# Patient Record
Sex: MALE | Race: WHITE | HISPANIC OR LATINO | Employment: OTHER | URBAN - METROPOLITAN AREA
[De-identification: names, ages, dates, MRNs, and addresses within clinical notes are randomized per-mention and may not be internally consistent; named-entity substitution may affect disease eponyms.]

---

## 2017-01-16 ENCOUNTER — TELEPHONE (OUTPATIENT)
Dept: UROLOGY | Facility: CLINIC | Age: 82
End: 2017-01-16

## 2017-01-16 NOTE — TELEPHONE ENCOUNTER
----- Message from Reta Garcia sent at 1/11/2017  3:20 PM CST -----  Contact: pt [phone]  Patient is calling to reschedule SUDS. Please call patient.

## 2017-01-23 DIAGNOSIS — K30 NUD (NONULCER DYSPEPSIA): ICD-10-CM

## 2017-01-23 RX ORDER — ONDANSETRON 4 MG/1
TABLET, FILM COATED ORAL
Qty: 25 TABLET | Refills: 0 | Status: SHIPPED | OUTPATIENT
Start: 2017-01-23 | End: 2017-05-11 | Stop reason: SDUPTHER

## 2017-02-16 ENCOUNTER — TELEPHONE (OUTPATIENT)
Dept: UROLOGY | Facility: CLINIC | Age: 82
End: 2017-02-16

## 2017-02-16 NOTE — TELEPHONE ENCOUNTER
i spoke with patient, who agreed to new suds date and time, he rescheduled due to not feeling good.

## 2017-02-16 NOTE — TELEPHONE ENCOUNTER
----- Message from Reta Garcia sent at 2/15/2017  8:35 AM CST -----  Contact: pt [phone]  Pt states he does not feel well and may have the flu. Pt states he needs to reschedule SUDS procedure scheduled for tomorrow. Please call pt to reschedule.

## 2017-02-25 DIAGNOSIS — K64.8 INTERNAL HEMORRHOIDS: ICD-10-CM

## 2017-02-26 RX ORDER — PRAMOXINE HYDROCHLORIDE HYDROCORTISONE ACETATE 100; 100 MG/10G; MG/10G
AEROSOL, FOAM TOPICAL
Qty: 10 G | Refills: 3 | Status: SHIPPED | OUTPATIENT
Start: 2017-02-26

## 2017-03-09 ENCOUNTER — PROCEDURE VISIT (OUTPATIENT)
Dept: UROLOGY | Facility: CLINIC | Age: 82
End: 2017-03-09
Payer: MEDICARE

## 2017-03-09 VITALS
DIASTOLIC BLOOD PRESSURE: 80 MMHG | WEIGHT: 187 LBS | SYSTOLIC BLOOD PRESSURE: 135 MMHG | HEART RATE: 57 BPM | BODY MASS INDEX: 26.18 KG/M2 | HEIGHT: 71 IN | TEMPERATURE: 99 F

## 2017-03-09 DIAGNOSIS — R35.1 NOCTURIA: ICD-10-CM

## 2017-03-09 DIAGNOSIS — N32.81 OAB (OVERACTIVE BLADDER): ICD-10-CM

## 2017-03-09 PROCEDURE — 51741 ELECTRO-UROFLOWMETRY FIRST: CPT | Mod: PBBFAC | Performed by: UROLOGY

## 2017-03-09 PROCEDURE — 51728 CYSTOMETROGRAM W/VP: CPT | Mod: PBBFAC | Performed by: UROLOGY

## 2017-03-09 PROCEDURE — 52000 CYSTOURETHROSCOPY: CPT | Mod: PBBFAC | Performed by: UROLOGY

## 2017-03-09 PROCEDURE — 52000 CYSTOURETHROSCOPY: CPT | Mod: S$PBB,59,, | Performed by: UROLOGY

## 2017-03-09 PROCEDURE — 51797 INTRAABDOMINAL PRESSURE TEST: CPT | Mod: 26,S$PBB,, | Performed by: UROLOGY

## 2017-03-09 PROCEDURE — 51784 ANAL/URINARY MUSCLE STUDY: CPT | Mod: PBBFAC | Performed by: UROLOGY

## 2017-03-09 PROCEDURE — 51728 CYSTOMETROGRAM W/VP: CPT | Mod: 26,S$PBB,, | Performed by: UROLOGY

## 2017-03-09 PROCEDURE — 51701 INSERT BLADDER CATHETER: CPT | Mod: PBBFAC | Performed by: UROLOGY

## 2017-03-09 PROCEDURE — 51741 ELECTRO-UROFLOWMETRY FIRST: CPT | Mod: 26,51,S$PBB, | Performed by: UROLOGY

## 2017-03-09 PROCEDURE — 51784 ANAL/URINARY MUSCLE STUDY: CPT | Mod: 26,51,S$PBB, | Performed by: UROLOGY

## 2017-03-09 RX ORDER — OXYBUTYNIN CHLORIDE 10 MG/1
10 TABLET, EXTENDED RELEASE ORAL DAILY
Qty: 30 TABLET | Refills: 11 | Status: SHIPPED | OUTPATIENT
Start: 2017-03-09 | End: 2017-06-15

## 2017-03-09 RX ORDER — LIDOCAINE HYDROCHLORIDE 20 MG/ML
JELLY TOPICAL ONCE
Status: COMPLETED | OUTPATIENT
Start: 2017-03-09 | End: 2017-03-09

## 2017-03-09 RX ORDER — CIPROFLOXACIN 250 MG/1
500 TABLET, FILM COATED ORAL ONCE
Status: COMPLETED | OUTPATIENT
Start: 2017-03-09 | End: 2017-03-09

## 2017-03-09 RX ADMIN — CIPROFLOXACIN 500 MG: 250 TABLET, FILM COATED ORAL at 11:03

## 2017-03-09 RX ADMIN — LIDOCAINE HYDROCHLORIDE: 20 JELLY TOPICAL at 11:03

## 2017-03-09 NOTE — PATIENT INSTRUCTIONS
SIMPLE URODYNAMIC STUDY (SUDS) & CYSTOSCOPY  UROLOGY CLINIC DISCHARGE INSTRUCTIONS    You have had a procedure that will require time to properly heal. Follow the instructions you have been given on how to care for yourself once you are home. Below is additional information to help in your recovery.    ACTIVITY  · There are no restrictions in activity. Start doing again the things you did before the procedure.  · You may experience a slight burning sensation. You may notice a small amount of blood in your urine. This will clear up within a day. Call the clinic if this continues beyond 48 hours.    DIET  · Continue your normal diet. You may eat the same foods you ate before your procedure.  · Drink plenty of fluids during the first 24-48 hours following your procedure.    MEDICATIONS  · Resume all other previous medications from your prescribing physician.  · Continue any pre=procedure antibiotics until they are all gone.    SIGNS AND SYMPTOMS TO REPORT TO THE DOCTOR  · Chills or fever greater than 101° F within 24 hours of procedure.  · Changes in urination, such as increased bleeding, foul smell, cloudy urine, or painful urination.  · Call your doctor with any questions or concerns.    For any emergency situation, call 501 immediately or go to your nearest emergency room.    [facility]  [phone]      Instructed by_________________________________          Patient Signature___________________________________                                                                                                                                                                                             If any problems after hours or weekends, you may call [phone] and ask for the urology resident on call.

## 2017-03-09 NOTE — PROCEDURES
Simple Urodynamics w/ Cysto  Date/Time: 3/9/2017 11:33 AM  Performed by: LISBETH CHAVIRA.  Authorized by: LISBETH CHAVIRA.   Comments: Procedure Date:  03/09/2017    Procedure:   Diagnostic Cystourethroscopy   Complex Cystometrogram   Voiding / Pressure Study with Intrarectal Balloon   Complex Uroflow   Electromyogram of Anal Sphincter.     Pre-OP Diagnosis:   OAB, nocturia, urgency   Post-OP Diagnosis:   same   Anesthesia:   Anesthesia Administered:   Intraurethral instillation of 10 mL 2% lidocaine (Xylocaine) jelly.   Findings:   --- Bladder ---   CYSTOMETROGRAM ( Filling Phase ):   Cystometric Numeric Data:   - First Desire (Sensation): 225 mL at 1cm of water.   - Normal Desire: 252mL at 5 cm of water.   - Strong Desire: 280 mL at 13 cm of water.   - Urgency (Imminent Void) : 331 mL at 21cm of water.   - Maximum Cystometric Capacity: 331 mL.   Compliance:   - normal.   Leak Point Pressure:   - Valsalva ( Abdominal ) Leak Point Pressure: none.   UROFLOW:   - Voided Volume: 360 mL.   - Residual Urine: 10 mL.   - Maximum Flow Rate: 30 mL/sec.   - Flow Pattern: normal  VOIDING PRESSURE STUDY ( Voiding Phase ):   Detrusor Pressure:   - Maximum Detrusor Pressure: 46cm of water.   - Detrusor Pressure at Maximum Flow: 24 cm of water.   - Detrusor Contraction Characteristics: Sustained contraction(s).  With abdominal straining  ELECTROMYOGRAM:   - normal.     ---Diagnostic Cystourethroscopy ---   Normal urethra.    Prostate absent, s/p RALP  Width of Bladder Neck Opening: Approximately 18 Fr.   Hyperemic bladder mucosa, ? IC.   Normal ureteral orifices bilaterally.       Description of Procedure:   Informed Consent:   - Risks, benefits and alternatives of procedure discussed with   patient and informed consent obtained.   Patient Position:   - Supine.   --- Bladder ---   Prep and Drape:   - Patient prepped and draped in usual sterile fashion using povidone   iodine (Betadine).   --- Diagnostic Cystourethroscopy ---    Instruments:   - 16 Fr flexible cystoscope with 0 degree lens.   Procedure Details:   - Cystoscope passed under vision into bladder.   - Bladder and urethra examined in their entirety with findings as   above.   --- Urodynamic Studies ---   Procedure Details:   Cystometrogram:   - Catheter(s) passed into the bladder.   - Rectal balloon inserted.   - Catheter(s) connected to infusion medium and to pressure recording   device.   - Infusion Rate: 30 mL / min.   Electromyogram:   - Perineal electromyogram pad placed and connected to electromyogram   recording device.   Equipment:   - Catheters: Double lumen catheter.   - Medium: Liquid.   - Pressure Recording Device: Calibrated electronic equipment.   Complications:   No immediate complications.    CONCLUSIONS:   1. Sensory urgency  2. Possible IC  3. Nocturia    Voiding diary day vs. Night time.  Avoid bladder irritants.  IC smart diet brochure given.  Continue oxybutynin xl 5 mg and may increase to 10 mg in the evening.      Post-OP Plan:   Patient was discharged home in a stable condition.  Medications: cipro  Follow up:  3 months

## 2017-03-09 NOTE — MR AVS SNAPSHOT
[facility] 4th [facility]  [address]  [facility] [address]  Phone: [phone]                  Hu Lopez   3/9/2017 9:30 AM   Procedure visit    Description:  Male : 1935   Provider:  SUDS, UROLOGY   Department:  [facility] 4th [facility]           Diagnoses this Visit        Comments    OAB (overactive bladder)         Nocturia                To Do List           Goals (5 Years of Data)     None      [facility]     [facility] On Call Nurse Care Line -  Assistance  Registered nurses in the [facility] On Call Center provide clinical advisement, health education, appointment booking, and other advisory services.  Call for this free service at [phone].             Medications           Message regarding Medications     Verify the changes and/or additions to your medication regime listed below are the same as discussed with your clinician today.  If any of these changes or additions are incorrect, please notify your healthcare provider.             Verify that the below list of medications is an accurate representation of the medications you are currently taking.  If none reported, the list may be blank. If incorrect, please contact your healthcare provider. Carry this list with you in case of emergency.           Current Medications     amlodipine (NORVASC) 2.5 MG tablet Take 2.5 mg by mouth once daily.    aspirin (ECOTRIN) 81 MG EC tablet Take 81 mg by mouth once daily.     atorvastatin (LIPITOR) 10 MG tablet Take 10 mg by mouth.    desonide (DESOWEN) 0.05 % cream     lansoprazole (PREVACID) 15 MG capsule Take 15 mg by mouth.    melatonin 5 mg Tab Take by mouth every evening.    metoprolol succinate (TOPROL-XL) 100 MG 24 hr tablet Take 100 mg by mouth once daily.     ondansetron (ZOFRAN) 4 MG tablet TAKE ONE TABLET BY MOUTH EVERY 12 HOURS AS NEEDED FOR NAUSEA    oxybutynin (DITROPAN-XL) 5 MG TR24 Take 1 tablet (5 mg total) by mouth once daily.    polyethylene  "glycol (GLYCOLAX) 17 gram PwPk Take by mouth as needed.    PROCTOFOAM HC rectal foam USE ONE APPLICATIORFUL RECTALLY TWICE DAILY    psyllium (METAMUCIL) powder Take 1 packet by mouth once daily.    sucralfate (CARAFATE) 100 mg/mL suspension Take 10 mLs (1 g total) by mouth 4 (four) times daily with meals and nightly.           Clinical Reference Information           Your Vitals Were     BP Pulse Temp Height Weight BMI    161/95 60 98.4 °F (36.9 °C) (Oral) 5' 11" (1.803 m) 84.8 kg (187 lb) 26.08 kg/m2      Blood Pressure          Most Recent Value    BP  (!)  161/95      Allergies as of 3/9/2017     Codeine    Simcor [Niacin-simvastatin]      Immunizations Administered on Date of Encounter - 3/9/2017     None      Orders Placed During Today's Visit      Normal Orders This Visit    Simple Urodynamics w/ Cysto       Instructions    SIMPLE URODYNAMIC STUDY (SUDS) & CYSTOSCOPY  UROLOGY CLINIC DISCHARGE INSTRUCTIONS    You have had a procedure that will require time to properly heal. Follow the instructions you have been given on how to care for yourself once you are home. Below is additional information to help in your recovery.    ACTIVITY  · There are no restrictions in activity. Start doing again the things you did before the procedure.  · You may experience a slight burning sensation. You may notice a small amount of blood in your urine. This will clear up within a day. Call the clinic if this continues beyond 48 hours.    DIET  · Continue your normal diet. You may eat the same foods you ate before your procedure.  · Drink plenty of fluids during the first 24-48 hours following your procedure.    MEDICATIONS  · Resume all other previous medications from your prescribing physician.  · Continue any pre=procedure antibiotics until they are all gone.    SIGNS AND SYMPTOMS TO REPORT TO THE DOCTOR  · Chills or fever greater than 101° F within 24 hours of procedure.  · Changes in urination, such as increased bleeding, foul " smell, cloudy urine, or painful urination.  · Call your doctor with any questions or concerns.    For any emergency situation, call 911 immediately or go to your nearest emergency room.    [facility]  [phone]      Instructed by_________________________________          Patient Signature___________________________________                                                                                                                                                                                             If any problems after hours or weekends, you may call [phone] and ask for the urology resident on call.       Language Assistance Services     ATTENTION: Language assistance services are available, free of charge. Please call [phone].      ATENCIÓN: Si kris gomes, tiene a ornelas disposición servicios gratuitos de asistencia lingüística. Llame al [phone].     CHÚ Ý: N?u b?n nói Ti?ng Vi?t, có các d?ch v? h? tr? ngôn ng? mi?n phí dành cho b?n. G?i s? [phone].         [facility] complies with applicable Federal civil rights laws and does not discriminate on the basis of race, color, national origin, age, disability, or sex.

## 2017-03-14 ENCOUNTER — PATIENT MESSAGE (OUTPATIENT)
Dept: UROLOGY | Facility: CLINIC | Age: 82
End: 2017-03-14

## 2017-03-14 NOTE — TELEPHONE ENCOUNTER
The followings are my impressions and recommendations from your urodynamic study and cystoscopic evaluation.    CONCLUSIONS:   1. Sensory urgency  2. Possible IC  3. Nocturia  4. S/p prostate surgery, open bladder neck without any obstruction    Recommend a Voiding diary day vs. Night time for 3 days.  Avoid bladder irritants.  IC smart diet brochure given.  Continue oxybutynin xl 5 mg and may increase to 10 mg in the evening.

## 2017-05-08 ENCOUNTER — TELEPHONE (OUTPATIENT)
Dept: INTERNAL MEDICINE | Facility: CLINIC | Age: 82
End: 2017-05-08

## 2017-05-11 DIAGNOSIS — K30 NUD (NONULCER DYSPEPSIA): ICD-10-CM

## 2017-05-11 RX ORDER — ONDANSETRON 4 MG/1
4 TABLET, FILM COATED ORAL EVERY 8 HOURS PRN
Qty: 30 TABLET | Refills: 3 | Status: SHIPPED | OUTPATIENT
Start: 2017-05-11 | End: 2018-01-09 | Stop reason: SDUPTHER

## 2017-06-15 ENCOUNTER — HOSPITAL ENCOUNTER (OUTPATIENT)
Dept: RADIOLOGY | Facility: HOSPITAL | Age: 82
Discharge: HOME | End: 2017-06-15
Attending: INTERNAL MEDICINE
Payer: MEDICARE

## 2017-06-15 ENCOUNTER — OFFICE VISIT (OUTPATIENT)
Dept: INTERNAL MEDICINE | Facility: CLINIC | Age: 82
End: 2017-06-15
Payer: MEDICARE

## 2017-06-15 VITALS
DIASTOLIC BLOOD PRESSURE: 82 MMHG | WEIGHT: 190.25 LBS | BODY MASS INDEX: 27.24 KG/M2 | HEIGHT: 70 IN | HEART RATE: 62 BPM | SYSTOLIC BLOOD PRESSURE: 128 MMHG

## 2017-06-15 DIAGNOSIS — K21.9 GASTROESOPHAGEAL REFLUX DISEASE, ESOPHAGITIS PRESENCE NOT SPECIFIED: ICD-10-CM

## 2017-06-15 DIAGNOSIS — E78.5 HYPERLIPIDEMIA, UNSPECIFIED HYPERLIPIDEMIA TYPE: ICD-10-CM

## 2017-06-15 DIAGNOSIS — I10 ESSENTIAL HYPERTENSION: Primary | ICD-10-CM

## 2017-06-15 DIAGNOSIS — M79.672 LEFT FOOT PAIN: ICD-10-CM

## 2017-06-15 DIAGNOSIS — Z23 NEED FOR VACCINATION WITH 13-POLYVALENT PNEUMOCOCCAL CONJUGATE VACCINE: ICD-10-CM

## 2017-06-15 DIAGNOSIS — Z85.46 HISTORY OF PROSTATE CANCER: ICD-10-CM

## 2017-06-15 DIAGNOSIS — R11.0 NAUSEA: ICD-10-CM

## 2017-06-15 DIAGNOSIS — Z86.39 PERSONAL HISTORY OF OTHER ENDOCRINE, NUTRITIONAL AND METABOLIC DISEASE: ICD-10-CM

## 2017-06-15 DIAGNOSIS — Z87.19 HISTORY OF DIVERTICULITIS: ICD-10-CM

## 2017-06-15 DIAGNOSIS — Z12.5 ENCOUNTER FOR SCREENING FOR MALIGNANT NEOPLASM OF PROSTATE: ICD-10-CM

## 2017-06-15 DIAGNOSIS — F41.9 ANXIETY: ICD-10-CM

## 2017-06-15 PROCEDURE — 73610 X-RAY EXAM OF ANKLE: CPT | Mod: TC,LT

## 2017-06-15 PROCEDURE — 99999 PR PBB SHADOW E&M-EST. PATIENT-LVL III: CPT | Mod: PBBFAC,,, | Performed by: INTERNAL MEDICINE

## 2017-06-15 PROCEDURE — 1159F MED LIST DOCD IN RCRD: CPT | Mod: ,,, | Performed by: INTERNAL MEDICINE

## 2017-06-15 PROCEDURE — 73610 X-RAY EXAM OF ANKLE: CPT | Mod: 26,LT,, | Performed by: RADIOLOGY

## 2017-06-15 PROCEDURE — 73630 X-RAY EXAM OF FOOT: CPT | Mod: 26,LT,, | Performed by: RADIOLOGY

## 2017-06-15 PROCEDURE — 1126F AMNT PAIN NOTED NONE PRSNT: CPT | Mod: ,,, | Performed by: INTERNAL MEDICINE

## 2017-06-15 PROCEDURE — 73630 X-RAY EXAM OF FOOT: CPT | Mod: TC,LT

## 2017-06-15 PROCEDURE — 99214 OFFICE O/P EST MOD 30 MIN: CPT | Mod: S$PBB,,, | Performed by: INTERNAL MEDICINE

## 2017-06-15 RX ORDER — SERTRALINE HYDROCHLORIDE 50 MG/1
TABLET, FILM COATED ORAL
Qty: 30 TABLET | Refills: 1 | Status: SHIPPED | OUTPATIENT
Start: 2017-06-15 | End: 2017-06-15 | Stop reason: SDUPTHER

## 2017-06-15 RX ORDER — SERTRALINE HYDROCHLORIDE 50 MG/1
TABLET, FILM COATED ORAL
Qty: 30 TABLET | Refills: 1 | Status: SHIPPED | OUTPATIENT
Start: 2017-06-15 | End: 2018-01-08 | Stop reason: SDUPTHER

## 2017-06-15 NOTE — PROGRESS NOTES
Internal Medicine    Subjective:      Patient ID: Hu Lopez is a 81 y.o. male.    Chief Complaint: Establish Care    Presents to establish care.    HTN:  Taking Norvasc 2.5mg daily and Toprol 100mg daily.  Taking ASA 81mg daily.  Followed by Cardiologist, Dr. Aris Mina, in [facility].  Unsure of official cardiac diagnoses.  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  Denies CAD or history of MI.  States he has had occasional left sided chest pain for many years.  Says full workup was completed by Cardiologist.  Cardiologist says pain is coming from left shoulder - history of fracture.    HLD:  Taking Lipitor 10mg daily.    GERD, nausea:  Has always struggled with abdominal discomfort symptoms.  Taking Prevacid 15mg daily and Carafate PRN.  For nausea takes Zofran PRN - maybe 1-3 times per month.  Followed by Dr. Pedro with GI - has had EGD 12/2015 and colonoscopy 6/2011.  Due for repeat colonoscopy.  Symptoms are believed to be anxiety related.    H/o prostate cancer:  S/p radical retropubic prostatectomy in 2008.  Followed by Urology (Dr. Canas) - last seen 9/2016.  Taking oxybutynin 5mg daily.  Bone scan and CT scan in 2/2016 did not show metastatic disease.  Has follow up with Dr. Rothman (bladder specialist) 8/25/17.  Goes through waves of urinary urgency and frequency.  States that currently his symptoms are controlled with the oxybutynin.    H/o diverticulitis:  S/p multiple surgeries - 1977 and 2005.  In ICU in [place] around [place].  Had colostomy for ~1 year.  Then had reversal.  No recent disease.      Vision:  Blind in left eye.  Saw retina specialist recently, Dr. Jackson.      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  Has occasional pain x 1-2 years.  Family says he has been limping some recently.      Anxiety:  Has waves of worries about health.  Daughter says hypochondraic.  States he has always been a worrier and catastophizer.  Also impatient.  Daughter says  patient's anxiety bad enough that it is bringing his wife down.  Denies symptoms of depression.    CALVIN:  Sometimes wears CPAP.        Review of Systems   Constitutional: Negative for activity change, chills, diaphoresis, fever and unexpected weight change.   HENT: Positive for rhinorrhea (Chronic). Negative for hearing loss and trouble swallowing.    Eyes: Positive for visual disturbance (Chronic). Negative for discharge.   Respiratory: Negative for chest tightness, shortness of breath and wheezing.    Cardiovascular: Negative for chest pain and palpitations.   Gastrointestinal: Positive for nausea. Negative for abdominal pain, blood in stool, constipation, diarrhea (Occasionally) and vomiting.   Endocrine: Negative for polyuria.   Genitourinary: Positive for urgency (Chronic). Negative for difficulty urinating and hematuria.   Musculoskeletal: Positive for arthralgias (Chronic). Negative for joint swelling and neck pain.   Skin: Negative for rash and wound.   Neurological: Negative for dizziness, weakness, numbness and headaches.   Psychiatric/Behavioral: Positive for sleep disturbance. Negative for confusion and dysphoric mood. The patient is nervous/anxious.        Past Medical History:   Diagnosis Date    Allergy     BPH (benign prostatic hyperplasia)     CAD (coronary artery disease)     Cataracts, bilateral     Diverticulitis     DVT (deep venous thrombosis)     Elevated PSA     GERD (gastroesophageal reflux disease)     Hyperlipidemia     Hypertension     Prostate cancer     Skin cancer     Sleep apnea     CPAP     Past Surgical History:   Procedure Laterality Date    APPENDECTOMY      COLON SURGERY      75,05    COLONOSCOPY      11    COLOSTOMY      PROSTATE SURGERY  2008    REVISION COLOSTOMY      SKIN CANCER EXCISION      chin    UPPER GASTROINTESTINAL ENDOSCOPY      11     Family History   Problem Relation Age of Onset    Prostate cancer Father     Colon cancer Paternal Uncle      "Stroke Paternal Uncle     Depression Paternal Uncle     No Known Problems Mother     Heart attack Brother 70     Social History   Substance Use Topics    Smoking status: Never Smoker    Smokeless tobacco: Never Used    Alcohol use 0.0 oz/week      Comment: rare       Medications and allergies reviewed.     Objective:     /82   Pulse 62   Ht 5' 10" (1.778 m)   Wt 86.3 kg (190 lb 4.1 oz)   BMI 27.30 kg/m²   Physical Exam   Constitutional: He is oriented to person, place, and time. He appears well-developed and well-nourished. No distress.   HENT:   Head: Normocephalic and atraumatic.   Eyes: Conjunctivae and EOM are normal. No scleral icterus.   Neck: No thyromegaly present.   Cardiovascular: Normal rate and regular rhythm.  Exam reveals no gallop and no friction rub.    No murmur heard.  Pulmonary/Chest: Effort normal and breath sounds normal. No respiratory distress. He has no wheezes. He has no rales.   Abdominal: Soft. Bowel sounds are normal. He exhibits no distension and no mass. There is no tenderness. There is no rebound and no guarding.   Musculoskeletal: Normal range of motion. He exhibits no edema or tenderness.   Lymphadenopathy:     He has no cervical adenopathy.   Neurological: He is alert and oriented to person, place, and time.   Skin: No rash noted. No erythema.   Psychiatric: He has a normal mood and affect. His behavior is normal.       Assessment:     1. Essential hypertension    2. Hyperlipidemia, unspecified hyperlipidemia type    3. Gastroesophageal reflux disease, esophagitis presence not specified    4. Nausea    5. History of prostate cancer    6. History of diverticulitis    7. Left foot pain    8. Anxiety    9. Need for vaccination with 13-polyvalent pneumococcal conjugate vaccine    10. Personal history of other endocrine, nutritional and metabolic disease     11. Encounter for screening for malignant neoplasm of prostate         Plan:   Hu was seen today for establish " care.    Diagnoses and all orders for this visit:    Essential hypertension   Continue medication/plan as discussed in HPI.  Requesting records from Cardiologist.    -     CBC auto differential; Future; Expected date: 06/15/2017  -     Comprehensive metabolic panel; Future; Expected date: 06/15/2017  -     Hemoglobin A1c; Future; Expected date: 06/15/2017  -     TSH; Future; Expected date: 06/15/2017    Hyperlipidemia, unspecified hyperlipidemia type   Continue medication/plan as discussed in HPI.  -     Lipid panel; Future; Expected date: 06/15/2017    Gastroesophageal reflux disease, esophagitis presence not specified  Nausea   Continue medication/plan as discussed in HPI.  -     CBC auto differential; Future; Expected date: 06/15/2017    History of prostate cancer  Encounter for screening for malignant neoplasm of prostate   -     PSA, Screening; Future; Expected date: 06/15/2017    History of diverticulitis    Left foot pain  -     X-Ray Foot Complete Left; Future; Expected date: 06/15/2017  -     X-Ray Ankle Complete Left; Future; Expected date: 06/15/2017    Anxiety  -     sertraline (ZOLOFT) 50 MG tablet; Take 25mg (1/2 tablet) daily x 1 week.  If tolerating the medication, can then increase to 50mg (1 tablet) daily.    Need for vaccination with 13-polyvalent pneumococcal conjugate vaccine  -     Pneumococcal Conjugate Vaccine (13 Valent) (IM)    Personal history of other endocrine, nutritional and metabolic disease   -     Hemoglobin A1c; Future; Expected date: 06/15/2017    Health maintenance reviewed with patient.     Return in about 1 month (around 7/15/2017).    Komal Talbot MD  Internal Medicine  [facility]

## 2017-06-20 ENCOUNTER — LAB VISIT (OUTPATIENT)
Dept: LAB | Facility: HOSPITAL | Age: 82
End: 2017-06-20
Attending: INTERNAL MEDICINE
Payer: MEDICARE

## 2017-06-20 DIAGNOSIS — Z85.46 HISTORY OF PROSTATE CANCER: ICD-10-CM

## 2017-06-20 DIAGNOSIS — Z12.5 ENCOUNTER FOR SCREENING FOR MALIGNANT NEOPLASM OF PROSTATE: ICD-10-CM

## 2017-06-20 DIAGNOSIS — K21.9 GASTROESOPHAGEAL REFLUX DISEASE, ESOPHAGITIS PRESENCE NOT SPECIFIED: ICD-10-CM

## 2017-06-20 DIAGNOSIS — Z86.39 PERSONAL HISTORY OF OTHER ENDOCRINE, NUTRITIONAL AND METABOLIC DISEASE: ICD-10-CM

## 2017-06-20 DIAGNOSIS — I10 ESSENTIAL HYPERTENSION: ICD-10-CM

## 2017-06-20 DIAGNOSIS — R11.0 NAUSEA: ICD-10-CM

## 2017-06-20 DIAGNOSIS — E78.5 HYPERLIPIDEMIA, UNSPECIFIED HYPERLIPIDEMIA TYPE: ICD-10-CM

## 2017-06-20 LAB
ALBUMIN SERPL BCP-MCNC: 3.8 G/DL
ALP SERPL-CCNC: 62 U/L
ALT SERPL W/O P-5'-P-CCNC: 24 U/L
ANION GAP SERPL CALC-SCNC: 10 MMOL/L
AST SERPL-CCNC: 25 U/L
BASOPHILS # BLD AUTO: 0.05 K/UL
BASOPHILS NFR BLD: 0.8 %
BILIRUB SERPL-MCNC: 0.7 MG/DL
BUN SERPL-MCNC: 21 MG/DL
CALCIUM SERPL-MCNC: 9.5 MG/DL
CHLORIDE SERPL-SCNC: 106 MMOL/L
CHOLEST/HDLC SERPL: 3.8 {RATIO}
CO2 SERPL-SCNC: 24 MMOL/L
COMPLEXED PSA SERPL-MCNC: 0.34 NG/ML
CREAT SERPL-MCNC: 1.2 MG/DL
DIFFERENTIAL METHOD: ABNORMAL
EOSINOPHIL # BLD AUTO: 0.3 K/UL
EOSINOPHIL NFR BLD: 5.5 %
ERYTHROCYTE [DISTWIDTH] IN BLOOD BY AUTOMATED COUNT: 13.4 %
EST. GFR  (AFRICAN AMERICAN): >60 ML/MIN/1.73 M^2
EST. GFR  (NON AFRICAN AMERICAN): 56.4 ML/MIN/1.73 M^2
ESTIMATED AVG GLUCOSE: 111 MG/DL
GLUCOSE SERPL-MCNC: 93 MG/DL
HBA1C MFR BLD HPLC: 5.5 %
HCT VFR BLD AUTO: 44 %
HDL/CHOLESTEROL RATIO: 26.5 %
HDLC SERPL-MCNC: 166 MG/DL
HDLC SERPL-MCNC: 44 MG/DL
HGB BLD-MCNC: 14.8 G/DL
LDLC SERPL CALC-MCNC: 103.6 MG/DL
LYMPHOCYTES # BLD AUTO: 1.5 K/UL
LYMPHOCYTES NFR BLD: 24 %
MCH RBC QN AUTO: 31.8 PG
MCHC RBC AUTO-ENTMCNC: 33.6 %
MCV RBC AUTO: 94 FL
MONOCYTES # BLD AUTO: 0.7 K/UL
MONOCYTES NFR BLD: 11 %
NEUTROPHILS # BLD AUTO: 3.7 K/UL
NEUTROPHILS NFR BLD: 58.7 %
NONHDLC SERPL-MCNC: 122 MG/DL
PLATELET # BLD AUTO: 248 K/UL
PMV BLD AUTO: 9.4 FL
POTASSIUM SERPL-SCNC: 4.9 MMOL/L
PROT SERPL-MCNC: 6.9 G/DL
RBC # BLD AUTO: 4.66 M/UL
SODIUM SERPL-SCNC: 140 MMOL/L
TRIGL SERPL-MCNC: 92 MG/DL
TSH SERPL DL<=0.005 MIU/L-ACNC: 1.13 UIU/ML
WBC # BLD AUTO: 6.21 K/UL

## 2017-06-20 PROCEDURE — 84153 ASSAY OF PSA TOTAL: CPT

## 2017-06-20 PROCEDURE — 84443 ASSAY THYROID STIM HORMONE: CPT

## 2017-06-20 PROCEDURE — 83036 HEMOGLOBIN GLYCOSYLATED A1C: CPT

## 2017-06-20 PROCEDURE — 36415 COLL VENOUS BLD VENIPUNCTURE: CPT

## 2017-06-20 PROCEDURE — 85025 COMPLETE CBC W/AUTO DIFF WBC: CPT

## 2017-06-20 PROCEDURE — 80053 COMPREHEN METABOLIC PANEL: CPT

## 2017-06-20 PROCEDURE — 80061 LIPID PANEL: CPT

## 2017-06-22 ENCOUNTER — TELEPHONE (OUTPATIENT)
Dept: INTERNAL MEDICINE | Facility: CLINIC | Age: 82
End: 2017-06-22

## 2017-06-23 ENCOUNTER — TELEPHONE (OUTPATIENT)
Dept: INTERNAL MEDICINE | Facility: CLINIC | Age: 82
End: 2017-06-23

## 2017-07-03 ENCOUNTER — TELEPHONE (OUTPATIENT)
Dept: INTERNAL MEDICINE | Facility: CLINIC | Age: 82
End: 2017-07-03

## 2017-07-03 DIAGNOSIS — I10 ESSENTIAL HYPERTENSION: Primary | ICD-10-CM

## 2017-07-03 DIAGNOSIS — I42.9 CARDIOMYOPATHY, UNSPECIFIED TYPE: ICD-10-CM

## 2017-07-03 NOTE — TELEPHONE ENCOUNTER
Left msg for pt's daughter that the pt's referral has been placed and she can call cardiology to schedule the appt.

## 2017-07-14 ENCOUNTER — OFFICE VISIT (OUTPATIENT)
Dept: OPTOMETRY | Facility: CLINIC | Age: 82
End: 2017-07-14
Payer: MEDICARE

## 2017-07-14 DIAGNOSIS — H52.4 MYOPIA WITH PRESBYOPIA, RIGHT: ICD-10-CM

## 2017-07-14 DIAGNOSIS — H52.11 MYOPIA WITH PRESBYOPIA, RIGHT: ICD-10-CM

## 2017-07-14 DIAGNOSIS — H54.40 BLIND LEFT EYE: ICD-10-CM

## 2017-07-14 DIAGNOSIS — H25.11 NUCLEAR SCLEROSIS, RIGHT: Primary | ICD-10-CM

## 2017-07-14 PROCEDURE — 99212 OFFICE O/P EST SF 10 MIN: CPT | Mod: PBBFAC | Performed by: OPTOMETRIST

## 2017-07-14 PROCEDURE — 92002 INTRM OPH EXAM NEW PATIENT: CPT | Mod: S$PBB,,, | Performed by: OPTOMETRIST

## 2017-07-14 PROCEDURE — 99999 PR PBB SHADOW E&M-EST. PATIENT-LVL II: CPT | Mod: PBBFAC,,, | Performed by: OPTOMETRIST

## 2017-07-14 RX ORDER — SILODOSIN 8 MG/1
8 CAPSULE ORAL DAILY
COMMUNITY
Start: 2016-07-27 | End: 2017-08-25
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
## 2017-07-24 ENCOUNTER — OFFICE VISIT (OUTPATIENT)
Dept: INTERNAL MEDICINE | Facility: CLINIC | Age: 82
End: 2017-07-24
Payer: MEDICARE

## 2017-07-24 VITALS
BODY MASS INDEX: 27.58 KG/M2 | WEIGHT: 192.69 LBS | HEART RATE: 62 BPM | HEIGHT: 70 IN | DIASTOLIC BLOOD PRESSURE: 68 MMHG | SYSTOLIC BLOOD PRESSURE: 109 MMHG

## 2017-07-24 DIAGNOSIS — F41.1 GAD (GENERALIZED ANXIETY DISORDER): Primary | ICD-10-CM

## 2017-07-24 DIAGNOSIS — R21 RASH OF FACE: ICD-10-CM

## 2017-07-24 DIAGNOSIS — R35.0 URINARY FREQUENCY: ICD-10-CM

## 2017-07-24 PROCEDURE — 1159F MED LIST DOCD IN RCRD: CPT | Mod: ,,, | Performed by: INTERNAL MEDICINE

## 2017-07-24 PROCEDURE — 1126F AMNT PAIN NOTED NONE PRSNT: CPT | Mod: ,,, | Performed by: INTERNAL MEDICINE

## 2017-07-24 PROCEDURE — 99999 PR PBB SHADOW E&M-EST. PATIENT-LVL III: CPT | Mod: PBBFAC,,, | Performed by: INTERNAL MEDICINE

## 2017-07-24 PROCEDURE — 99213 OFFICE O/P EST LOW 20 MIN: CPT | Mod: PBBFAC | Performed by: INTERNAL MEDICINE

## 2017-07-24 PROCEDURE — 99214 OFFICE O/P EST MOD 30 MIN: CPT | Mod: S$PBB,,, | Performed by: INTERNAL MEDICINE

## 2017-07-24 RX ORDER — TRIAMCINOLONE ACETONIDE 0.25 MG/ML
1 LOTION TOPICAL 2 TIMES DAILY
Qty: 60 ML | Refills: 0 | Status: SHIPPED | OUTPATIENT
Start: 2017-07-24 | End: 2018-01-08

## 2017-07-24 NOTE — PROGRESS NOTES
Internal Medicine    Subjective:      Patient ID: Hu Lopez is a 81 y.o. male.    Chief Complaint: Follow-up (essential hypertension) and Rash (3Xdays,itches, left side of face)    Presents for follow up appointment for anxiety.    Anxiety:  Started on Zoloft 25mg daily at last appointment with plan to increase to 50mg daily after 1 week.  States he started the medication and felt like he was urinating more often.  Was supposed to be on Oxybutynin and once he started this medication the urinary symptoms resolved.  Has not restarted Zoloft.  History of frequent worries and catastrophizing, especially about his health.  Daughter has described as hypochondraic.    Rash on face:  Reports red spots on face that are itchy.  Has had int he past.  Has appointment with Dermatology on August 3rd.  Had some Fluocinonide 0.05% cream at home and has been using this on face x 3 days.  Feels it has helped some.      Chronic issues not covered today:  · HTN:  Taking Norvasc 2.5mg daily and Toprol 100mg daily.  Taking ASA 81mg daily.  Followed by Cardiologist, Dr. Tom Mina, in [facility].  Unsure of official cardiac diagnoses.  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  Denies CAD or history of MI.  States he has had occasional left sided chest pain for many years.  Says full workup was completed by Cardiologist.  Cardiologist says pain is coming from left shoulder - history of fracture.  · HLD:  Taking Lipitor 10mg daily.  · GERD, nausea:  Has always struggled with abdominal discomfort symptoms.  Taking Prevacid 15mg daily and Carafate PRN.  For nausea takes Zofran PRN - maybe 1-3 times per month.  Followed by Dr. Pedro with GI - has had EGD 12/2015 and colonoscopy 6/2011.  Due for repeat colonoscopy.  Symptoms are believed to be anxiety related.  · H/o prostate cancer:  S/p radical retropubic prostatectomy in 2008.  Followed by Urology (Dr. Canas) - last seen 9/2016.  Taking oxybutynin 5mg daily.  Bone scan and CT scan  "in 2/2016 did not show metastatic disease.  Has follow up with Dr. Rothman (bladder specialist) 8/25/17.  Goes through waves of urinary urgency and frequency.  States that currently his symptoms are controlled with the oxybutynin.  · H/o diverticulitis:  S/p multiple surgeries - 1977 and 2005.  In ICU in [place] around [place].  Had colostomy for ~1 year.  Then had reversal.  No recent disease.    · Vision:  Blind in left eye.  Saw retina specialist recently, Dr. Jackson.    · Left ankle/foot pain:  Fell from elevator platform about 20 years ago.  Has occasional pain x 1-2 years.  Family says he has been limping some recently.    · CALVIN:  Sometimes wears CPAP.        Review of Systems   Constitutional: Negative for appetite change, chills, fatigue, fever and unexpected weight change.   HENT: Negative for rhinorrhea and trouble swallowing.    Eyes: Negative for visual disturbance.   Respiratory: Negative for cough, chest tightness, shortness of breath and wheezing.    Cardiovascular: Negative for chest pain, palpitations and leg swelling.   Gastrointestinal: Negative for abdominal pain, blood in stool, constipation, diarrhea, nausea and vomiting.   Genitourinary: Negative for difficulty urinating.   Musculoskeletal: Negative for myalgias.   Skin: Positive for rash. Negative for wound.   Neurological: Negative for dizziness, weakness, numbness and headaches.   Psychiatric/Behavioral: Negative for behavioral problems, dysphoric mood and suicidal ideas. The patient is nervous/anxious.        Past medical history, surgical history, and family medical history reviewed and updated as appropriate.    Medications and allergies reviewed.     Objective:     /68   Pulse 62   Ht 5' 10" (1.778 m)   Wt 87.4 kg (192 lb 10.9 oz)   BMI 27.65 kg/m²   Physical Exam   Constitutional: He is oriented to person, place, and time. He appears well-developed and well-nourished. No distress.   HENT:   Head: Normocephalic and atraumatic. "   Eyes: Conjunctivae and EOM are normal. No scleral icterus.   Cardiovascular: Normal rate and regular rhythm.  Exam reveals no gallop and no friction rub.    No murmur heard.  Pulmonary/Chest: Effort normal and breath sounds normal. No respiratory distress. He has no wheezes. He has no rales.   Musculoskeletal: He exhibits no edema or tenderness.   Neurological: He is alert and oriented to person, place, and time.   Skin: Rash noted. No erythema.   Very small erythematous macules on lower face in region of shaving.  No swelling, tenderness, or discharge.   Psychiatric: He has a normal mood and affect. His behavior is normal.       RESULTS:   Lab Results   Component Value Date    WBC 6.21 06/20/2017    HGB 14.8 06/20/2017    HCT 44.0 06/20/2017    MCV 94 06/20/2017     06/20/2017     BMP  Lab Results   Component Value Date     06/20/2017    K 4.9 06/20/2017     06/20/2017    CO2 24 06/20/2017    BUN 21 06/20/2017    CREATININE 1.2 06/20/2017    CALCIUM 9.5 06/20/2017    ANIONGAP 10 06/20/2017    ESTGFRAFRICA >60.0 06/20/2017    EGFRNONAA 56.4 (A) 06/20/2017     Lab Results   Component Value Date    ALT 24 06/20/2017    AST 25 06/20/2017    ALKPHOS 62 06/20/2017    BILITOT 0.7 06/20/2017     Lab Results   Component Value Date    TSH 1.131 06/20/2017       Assessment:     1. PARVEEN (generalized anxiety disorder)    2. Rash of face    3. Urinary frequency        Plan:   Hu was seen today for follow-up and rash.    Diagnoses and all orders for this visit:    PARVEEN (generalized anxiety disorder)   Patient stopped Zoloft after 3 days due to urinary frequency, which has now resolved after resuming his Oxybutynin.  Restart Zoloft.    Rash of face   Patient currently using fluocinonide 0.05% which is high potency.  Will give lower potency steroid since using on face.  -     triamcinolone acetonide 0.025 % Lotn; Apply 1 application topically 2 (two) times daily.    Urinary frequency   Resolved after resuming  Oxybutynin.  Has upcoming appointment with Dr. Rothman.    Return in about 8 weeks (around 9/15/2017).    Komal Talbot MD  Internal Medicine  [facility]

## 2017-08-16 ENCOUNTER — OFFICE VISIT (OUTPATIENT)
Dept: PSYCHIATRY | Facility: CLINIC | Age: 82
End: 2017-08-16
Payer: MEDICARE

## 2017-08-16 VITALS
SYSTOLIC BLOOD PRESSURE: 126 MMHG | WEIGHT: 193 LBS | HEART RATE: 67 BPM | HEIGHT: 70 IN | BODY MASS INDEX: 27.63 KG/M2 | DIASTOLIC BLOOD PRESSURE: 63 MMHG

## 2017-08-16 DIAGNOSIS — F41.1 GAD (GENERALIZED ANXIETY DISORDER): Primary | ICD-10-CM

## 2017-08-16 PROCEDURE — 3008F BODY MASS INDEX DOCD: CPT | Mod: ,,, | Performed by: INTERNAL MEDICINE

## 2017-08-16 PROCEDURE — 90833 PSYTX W PT W E/M 30 MIN: CPT | Mod: PBBFAC | Performed by: INTERNAL MEDICINE

## 2017-08-16 PROCEDURE — 1159F MED LIST DOCD IN RCRD: CPT | Mod: ,,, | Performed by: INTERNAL MEDICINE

## 2017-08-16 PROCEDURE — 99999 PR PBB SHADOW E&M-EST. PATIENT-LVL III: CPT | Mod: PBBFAC,,, | Performed by: INTERNAL MEDICINE

## 2017-08-16 PROCEDURE — 90833 PSYTX W PT W E/M 30 MIN: CPT | Mod: S$PBB,,, | Performed by: INTERNAL MEDICINE

## 2017-08-16 PROCEDURE — 3074F SYST BP LT 130 MM HG: CPT | Mod: ,,, | Performed by: INTERNAL MEDICINE

## 2017-08-16 PROCEDURE — 3078F DIAST BP <80 MM HG: CPT | Mod: ,,, | Performed by: INTERNAL MEDICINE

## 2017-08-16 PROCEDURE — 99213 OFFICE O/P EST LOW 20 MIN: CPT | Mod: S$PBB,,, | Performed by: INTERNAL MEDICINE

## 2017-08-16 PROCEDURE — 99213 OFFICE O/P EST LOW 20 MIN: CPT | Mod: PBBFAC | Performed by: INTERNAL MEDICINE

## 2017-08-16 NOTE — PROGRESS NOTES
"OUTPATIENT PSYCHIATRY INITIAL VISIT    ENCOUNTER DATE:  8/16/2017  SITE:  [facility]  REFFERAL SOURCE:  Komal Talbot MD  LENGTH OF SESSION:  60 minutes    CHIEF COMPLAINT:   Anxiety    HISTORY OF PRESENTING ILLNESS:  Hu Lopez is a 81 y.o. male with history of anxiety who presents for family meeting.  Patient has been seen in Internal Medicine on 6/15/17 and 7/24/17.  He was started on Zoloft 25mg daily at 6/15/17 appointment with plan to increase to 50mg daily after 1 week.  At follow up appointment on 7/24/17, he says he started the medication but then stopped because he felt he was urinating more often (had also stopped taking Oxybutynin around that time).      History as told by patient and family:  Patient reports a history of anxiety but does not feel it has caused problems.  Describes anxiety as "feels like things need to be quicker," especially when he worked as an .  States he would occasionally get upset if things did not go a certain way.  Family reports this was a big issue.  Family states he worries and catastrophizes constantly "about everything", especially about his health.  They feel the anxiety is so severe that is is causing the entire family to be more "anxious and on edge."  They feel it is negatively affecting how the family functions and relates.  Patient describes frequent worrying since he was a child.  Also states that when he was young he would wash his hands over and over until his skin was raw.  Says he does not do this anymore.  But does have particular fears of germs (i.e. Will not drink from the wine cup at [employer] or let any of his family members do so).   He states he checks locks every night before he goes to bed but very rarely rechecks (only when going out of town).  Family states he turns the hot water heater and electricity off when he goes out of town - they disagree about whether this is logical.  Family describes anxiety as " interfering from his ability to make decisions, especially in regard to spending money.  Patient feels many of his traits are from his [place] culture.  Denies symptoms of depression, including depressed mood, anhedonia, worthlessness, hopelessness, low energy, difficulty concentrating, changes in appetite/weight, or psychomotor agitation/retardation.  Reports chronically poor sleep due to noncompliance with CPAP.  Denies history of jamie, psychosis, SI, or HI.    Medication side effects:  No  Medication compliance:  No - has not restarted Zoloft as discussed at last appointment    Psychotherapy:  · Target symptoms: anxiety   · Why chosen therapy is appropriate versus another modality: relevant to diagnosis  · Outcome monitoring methods: self-report, observation, feedback from family  · Therapeutic intervention type: supportive psychotherapy  · Topics discussed/themes: relationships difficulties, stress related to medical comorbidities, difficulty managing affect in interpersonal relationships, symptom recognition, life stage transitional issues  · The patient's response to the intervention is guarded. The patient's progress toward treatment goals is fair.   · Duration of intervention: 30 minutes.    PSYCHIATRIC REVIEW OF SYSTEMS:  Trouble with sleep:  Yes  Appetite changes:  Denies  Weight changes:  Denies  Lack of energy:  Denies  Anhedonia:  Denies  Somatic symptoms:  Denies  Anxiety/panic:  As above  Guilty/hopeless:  Denies  Self-injurious behavior/risky behavior:  Denies  Any drugs:  Denies  Alcohol:  Denies    MEDICAL REVIEW OF SYSTEMS:  Complete review of systems performed covering Constitutional, Eyes, ENT/Mouth, Cardiovascular, Respiratory, Gastrointestinal, Genitourinary, Musculoskeletal, Skin, Neurologic, Endocrine, and Allergy/Immune.  All systems negative except for increased urination at night.     PAST PSYCHIATRIC, MEDICAL, AND SOCIAL HISTORY REVIEWED  The patient's past medical, family and social  history have been reviewed and updated as appropriate within the electronic medical record - see encounter notes.    MEDICATIONS:    Current Outpatient Prescriptions:     amlodipine (NORVASC) 2.5 MG tablet, Take 2.5 mg by mouth once daily., Disp: , Rfl:     aspirin (ECOTRIN) 81 MG EC tablet, Take 81 mg by mouth once daily. , Disp: , Rfl:     atorvastatin (LIPITOR) 10 MG tablet, Take 10 mg by mouth., Disp: , Rfl:     desonide (DESOWEN) 0.05 % cream, , Disp: , Rfl:     lansoprazole (PREVACID) 15 MG capsule, Take 15 mg by mouth., Disp: , Rfl:     melatonin 5 mg Tab, Take by mouth every evening., Disp: , Rfl:     metoprolol succinate (TOPROL-XL) 100 MG 24 hr tablet, Take 100 mg by mouth once daily. , Disp: , Rfl:     ondansetron (ZOFRAN) 4 MG tablet, Take 1 tablet (4 mg total) by mouth every 8 (eight) hours as needed for Nausea., Disp: 30 tablet, Rfl: 3    oxybutynin (DITROPAN-XL) 5 MG TR24, Take 1 tablet (5 mg total) by mouth once daily., Disp: 30 tablet, Rfl: 11    polyethylene glycol (GLYCOLAX) 17 gram PwPk, Take by mouth as needed., Disp: , Rfl:     PROCTOFOAM HC rectal foam, USE ONE APPLICATIORFUL RECTALLY TWICE DAILY, Disp: 10 g, Rfl: 3    psyllium (METAMUCIL) powder, Take 1 packet by mouth once daily., Disp: , Rfl:     sertraline (ZOLOFT) 50 MG tablet, Take 25mg (1/2 tablet) daily x 1 week.  If tolerating the medication, can then increase to 50mg (1 tablet) daily., Disp: 30 tablet, Rfl: 1    silodosin (RAPAFLO) 8 mg Cap capsule, Place 8 mg into the left eye once daily., Disp: , Rfl:     sucralfate (CARAFATE) 100 mg/mL suspension, Take 10 mLs (1 g total) by mouth 4 (four) times daily with meals and nightly., Disp: 414 mL, Rfl: 1    triamcinolone acetonide 0.025 % Lotn, Apply 1 application topically 2 (two) times daily., Disp: 60 mL, Rfl: 0    ALLERGIES:  Review of patient's allergies indicates:   Allergen Reactions    Codeine Nausea And Vomiting    Simcor [niacin-simvastatin] Rash    Simvastatin  "Rash     PSYCHIATRIC EXAM:  Vitals:    08/16/17 1500   BP: 126/63   Pulse: 67   Weight: 87.5 kg (193 lb)   Height: 5' 10" (1.778 m)   Appearance:  Well groomed, appearing healthy and of stated age  Behavior:  Cooperative, pleasant, no psychomotor agitation or retardation  Speech:  Normal rate, rhythm, prosody, and volume  Mood:  "Good"  Affect:  Guarded  Thought Process:  Linear, logical, goal directed  Thought Content:  Negative for suicidal ideation, homicidal ideation, delusions or hallucinations.  Associations:  Intact  Memory:  Grossly Intact  Level of Consciousness/Orientation:  Grossly intact  Fund of Knowledge:  Good  Attention:  Good  Language:  Fluent, able to name abstract and concrete objects  Insight:  Poor in regards to recognition of his anxiety and how it is negatively affecting others  Judgment:  Intact  Psychomotor signs:  No involuntary movements or tremor  Gait:  Normal    RELEVANT LABS/STUDIES:  Lab Results   Component Value Date    WBC 6.21 06/20/2017    HGB 14.8 06/20/2017    HCT 44.0 06/20/2017    MCV 94 06/20/2017     06/20/2017     BMP  Lab Results   Component Value Date     06/20/2017    K 4.9 06/20/2017     06/20/2017    CO2 24 06/20/2017    BUN 21 06/20/2017    CREATININE 1.2 06/20/2017    CALCIUM 9.5 06/20/2017    ANIONGAP 10 06/20/2017    ESTGFRAFRICA >60.0 06/20/2017    EGFRNONAA 56.4 (A) 06/20/2017     Lab Results   Component Value Date    ALT 24 06/20/2017    AST 25 06/20/2017    ALKPHOS 62 06/20/2017    BILITOT 0.7 06/20/2017     Lab Results   Component Value Date    TSH 1.131 06/20/2017     Lab Results   Component Value Date    HGBA1C 5.5 06/20/2017       IMPRESSION:    Hu Lopez is a 81 y.o. male with history of anxiety who presents for family meeting. .    Status/Progress:  Based on the examination today, the patient's problem(s) is/are inadequately controlled.  New problems have not been presented today.    Risk Parameters:  Patient reports no " suicidal ideation  Patient reports no homicidal ideation  Patient reports no self-injurious behavior  Patient reports no violent behavior    DIAGNOSES:    ICD-10-CM ICD-9-CM   1. PARVEEN (generalized anxiety disorder) F41.1 300.02     PLAN:  · Restart Zoloft 25mg daily x 1 week and then increase to 50mg daily.  · Discussed recommendation of psychotherapy - patient declining at this time.    RETURN TO CLINIC:  Return in about 4 weeks in Internal Medicine.

## 2017-08-25 ENCOUNTER — OFFICE VISIT (OUTPATIENT)
Dept: UROLOGY | Facility: CLINIC | Age: 82
End: 2017-08-25
Payer: MEDICARE

## 2017-08-25 VITALS
BODY MASS INDEX: 26.89 KG/M2 | DIASTOLIC BLOOD PRESSURE: 82 MMHG | WEIGHT: 187.38 LBS | HEART RATE: 55 BPM | SYSTOLIC BLOOD PRESSURE: 141 MMHG

## 2017-08-25 DIAGNOSIS — Z85.46 HISTORY OF PROSTATE CANCER: ICD-10-CM

## 2017-08-25 DIAGNOSIS — R39.15 URGENCY OF URINATION: ICD-10-CM

## 2017-08-25 DIAGNOSIS — R35.1 NOCTURIA: Primary | ICD-10-CM

## 2017-08-25 PROCEDURE — 3079F DIAST BP 80-89 MM HG: CPT | Mod: ,,, | Performed by: UROLOGY

## 2017-08-25 PROCEDURE — 1126F AMNT PAIN NOTED NONE PRSNT: CPT | Mod: ,,, | Performed by: UROLOGY

## 2017-08-25 PROCEDURE — 3077F SYST BP >= 140 MM HG: CPT | Mod: ,,, | Performed by: UROLOGY

## 2017-08-25 PROCEDURE — 1159F MED LIST DOCD IN RCRD: CPT | Mod: ,,, | Performed by: UROLOGY

## 2017-08-25 PROCEDURE — 99999 PR PBB SHADOW E&M-EST. PATIENT-LVL IV: CPT | Mod: PBBFAC,,, | Performed by: UROLOGY

## 2017-08-25 PROCEDURE — 99214 OFFICE O/P EST MOD 30 MIN: CPT | Mod: PBBFAC | Performed by: UROLOGY

## 2017-08-25 PROCEDURE — 99215 OFFICE O/P EST HI 40 MIN: CPT | Mod: S$PBB,,, | Performed by: UROLOGY

## 2017-08-25 RX ORDER — OXYBUTYNIN CHLORIDE 10 MG/1
10 TABLET, EXTENDED RELEASE ORAL NIGHTLY
Qty: 30 TABLET | Refills: 11 | Status: SHIPPED | OUTPATIENT
Start: 2017-08-25 | End: 2018-03-27 | Stop reason: SDUPTHER

## 2017-08-25 NOTE — PROGRESS NOTES
CC: urgency, nocturia    uH Lopez is a 81 y.o. man who is here for the evaluation of Follow-up (3 month f/u)  I saw him for his urinary symptoms, referred by Dr. Canas.  a history of prostate cancer.  radical retropubic prostatectomy in 1980 by Dr. Mitchell. Rising PSA. Bone scan and CT scan in February did not show metastatic disease.  Patient complains of nocturia 4-5x/noc.  Did not respond to rapaflo..  Tried oxybutynin 5 ml xl.    SUDS cysto by me on 3/9/17.  His bladder capacity 330 ml  S/p RALP with no obstruction.  CONCLUSIONS:   1. Sensory urgency  2. Possible IC  3. Nocturia    I recommended to increase oxybutynin xl to 10 mg at night and to avoid bladder irritants.  Denies flank pain, dysuira, hematuria .      Past Medical History:   Diagnosis Date    Allergy     BPH (benign prostatic hyperplasia)     CAD (coronary artery disease)     Cataracts, bilateral     Diverticulitis     DVT (deep venous thrombosis)     Elevated PSA     GERD (gastroesophageal reflux disease)     Glaucoma     Hyperlipidemia     Hypertension     Prostate cancer     Skin cancer     Sleep apnea     CPAP     Past Surgical History:   Procedure Laterality Date    APPENDECTOMY      COLON SURGERY      75,05    COLONOSCOPY      11    COLOSTOMY      PROSTATE SURGERY  2008    REVISION COLOSTOMY      SKIN CANCER EXCISION      chin    UPPER GASTROINTESTINAL ENDOSCOPY      11     Social History   Substance Use Topics    Smoking status: Never Smoker    Smokeless tobacco: Never Used    Alcohol use 0.0 oz/week      Comment: rare     Family History   Problem Relation Age of Onset    Prostate cancer Father     Colon cancer Paternal Uncle     Stroke Paternal Uncle     Depression Paternal Uncle     No Known Problems Mother     Heart attack Brother 70     Allergy:  Review of patient's allergies indicates:   Allergen Reactions    Codeine Nausea And Vomiting    Simcor [niacin-simvastatin] Rash    Simvastatin Rash      Outpatient Encounter Prescriptions as of 8/25/2017   Medication Sig Dispense Refill    amlodipine (NORVASC) 2.5 MG tablet Take 2.5 mg by mouth once daily.      aspirin (ECOTRIN) 81 MG EC tablet Take 81 mg by mouth once daily.       atorvastatin (LIPITOR) 10 MG tablet Take 10 mg by mouth.      desonide (DESOWEN) 0.05 % cream       lansoprazole (PREVACID) 15 MG capsule Take 15 mg by mouth.      melatonin 5 mg Tab Take by mouth every evening.      metoprolol succinate (TOPROL-XL) 100 MG 24 hr tablet Take 100 mg by mouth once daily.       ondansetron (ZOFRAN) 4 MG tablet Take 1 tablet (4 mg total) by mouth every 8 (eight) hours as needed for Nausea. 30 tablet 3    polyethylene glycol (GLYCOLAX) 17 gram PwPk Take by mouth as needed.      PROCTOFOAM HC rectal foam USE ONE APPLICATIORFUL RECTALLY TWICE DAILY 10 g 3    psyllium (METAMUCIL) powder Take 1 packet by mouth once daily.      sertraline (ZOLOFT) 50 MG tablet Take 25mg (1/2 tablet) daily x 1 week.  If tolerating the medication, can then increase to 50mg (1 tablet) daily. 30 tablet 1    [DISCONTINUED] oxybutynin (DITROPAN-XL) 5 MG TR24 Take 1 tablet (5 mg total) by mouth once daily. 30 tablet 11    oxybutynin (DITROPAN-XL) 10 MG 24 hr tablet Take 1 tablet (10 mg total) by mouth every evening. 30 tablet 11    sucralfate (CARAFATE) 100 mg/mL suspension Take 10 mLs (1 g total) by mouth 4 (four) times daily with meals and nightly. 414 mL 1    triamcinolone acetonide 0.025 % Lotn Apply 1 application topically 2 (two) times daily. 60 mL 0    [DISCONTINUED] silodosin (RAPAFLO) 8 mg Cap capsule Place 8 mg into the left eye once daily.       No facility-administered encounter medications on file as of 8/25/2017.      Review of Systems   General ROS: GENERAL:  No weight gain or loss  SKIN:  No rashes or lacerations  HEAD:  No headaches  EYES:  No exophthalmos, jaundice or blindness  EARS:  No dizziness, tinnitus or hearing loss  NOSE:  No changes in  smell  MOUTH & THROAT:  No dyskinetic movements or obvious goiter  CHEST:  No shortness of breath, hyperventilation or cough  CARDIOVASCULAR:  No tachycardia or chest pain  ABDOMEN:  No nausea, vomiting, pain, constipation or diarrhea  URINARY:  No frequency, dysuria or sexual dysfunction  ENDOCRINE:  No polydipsia, polyuria  MUSCULOSKELETAL:  No pain or stiffness of the joints  NEUROLOGIC:  No weakness, sensory changes, seizures, confusion, memory loss, tremor or other abnormal movements  Physical Exam     Vitals:    08/25/17 0955   BP: (!) 141/82   Pulse: (!) 55     Physical Exam  Genitalia:  Scrotum: no rash or lesion  Normal symmetric epididymis without masses  Normal vas palpated  Normal size, symmetric testicles with no masses   Normal urethral meatus with no discharge  Normal circumcised penis with no lesion   Rectal:  Normal perineum and anus upon inspection.  Normal tone, no masses or tenderness;     LABS:  Lab Results   Component Value Date    PSA 0.34 06/20/2017    PSA 0.02 01/19/2012    PSADIAG 0.26 09/21/2016    PSADIAG 0.22 01/26/2016     Results for orders placed or performed in visit on 09/21/16   Prostate Specific Antigen, Diagnostic   Result Value Ref Range    PSA DIAGNOSTIC 0.26 0.00 - 4.00 ng/mL   Results for orders placed or performed in visit on 01/26/16   Prostate Specific Antigen, Diagnostic   Result Value Ref Range    PSA DIAGNOSTIC 0.22 0.00 - 4.00 ng/mL     Lab Results   Component Value Date    CREATININE 1.2 06/20/2017    CREATININE 1.3 11/14/2016    CREATININE 1.3 09/09/2016     No results found for this or any previous visit.  Urine Culture, Routine   Date Value Ref Range Status   01/31/2012 NO GROWTH.  Final       Assessment and Plan:  Hu was seen today for follow-up.    Diagnoses and all orders for this visit:    Nocturia  -     oxybutynin (DITROPAN-XL) 10 MG 24 hr tablet; Take 1 tablet (10 mg total) by mouth every evening.    History of prostate cancer    Urgency of urination      voiding diary day vs night time.  I explained suspected nocturnal polyuria as his underlying problems.  If he is still bothered by his LUTS, he may consider botox injection or sacral nerve stimulation.  Both therapies explained in detail.    IC smart diet recommended.  A brochure given.  I spent 40 minutes with the patient of which more than half was spent in direct consultation with the patient in regards to our treatment and plan.    Follow-up:  Return in about 3 months (around 11/25/2017) for voiding diary.

## 2017-09-01 ENCOUNTER — TELEPHONE (OUTPATIENT)
Dept: OPTOMETRY | Facility: CLINIC | Age: 82
End: 2017-09-01

## 2017-09-01 NOTE — TELEPHONE ENCOUNTER
Called pt, he is not seeing well out of his glasses. Santosh said they were made correctly. Scheduled pt for 9/8/2017

## 2017-09-08 ENCOUNTER — OFFICE VISIT (OUTPATIENT)
Dept: OPTOMETRY | Facility: CLINIC | Age: 82
End: 2017-09-08
Payer: MEDICARE

## 2017-09-08 DIAGNOSIS — H25.11 NUCLEAR SCLEROSIS, RIGHT: Primary | ICD-10-CM

## 2017-09-08 PROCEDURE — 99999 PR PBB SHADOW E&M-EST. PATIENT-LVL II: CPT | Mod: PBBFAC,,, | Performed by: OPTOMETRIST

## 2017-09-08 PROCEDURE — 99212 OFFICE O/P EST SF 10 MIN: CPT | Mod: PBBFAC | Performed by: OPTOMETRIST

## 2017-09-08 PROCEDURE — 99499 UNLISTED E&M SERVICE: CPT | Mod: S$PBB,,, | Performed by: OPTOMETRIST

## 2017-09-08 RX ORDER — METOPROLOL TARTRATE 100 MG/1
TABLET ORAL
COMMUNITY
Start: 2017-08-08 | End: 2017-09-08 | Stop reason: SDUPTHER

## 2017-09-27 ENCOUNTER — OFFICE VISIT (OUTPATIENT)
Dept: INTERNAL MEDICINE | Facility: CLINIC | Age: 82
End: 2017-09-27
Payer: MEDICARE

## 2017-09-27 VITALS
BODY MASS INDEX: 26.39 KG/M2 | DIASTOLIC BLOOD PRESSURE: 68 MMHG | WEIGHT: 188.5 LBS | SYSTOLIC BLOOD PRESSURE: 122 MMHG | HEART RATE: 62 BPM | HEIGHT: 71 IN | TEMPERATURE: 98 F

## 2017-09-27 DIAGNOSIS — L98.9 SKIN LESION: Primary | ICD-10-CM

## 2017-09-27 PROCEDURE — 99212 OFFICE O/P EST SF 10 MIN: CPT | Mod: S$PBB,,, | Performed by: INTERNAL MEDICINE

## 2017-09-27 PROCEDURE — 99999 PR PBB SHADOW E&M-EST. PATIENT-LVL III: CPT | Mod: PBBFAC,,, | Performed by: INTERNAL MEDICINE

## 2017-09-27 PROCEDURE — 1126F AMNT PAIN NOTED NONE PRSNT: CPT | Mod: ,,, | Performed by: INTERNAL MEDICINE

## 2017-09-27 PROCEDURE — 3074F SYST BP LT 130 MM HG: CPT | Mod: ,,, | Performed by: INTERNAL MEDICINE

## 2017-09-27 PROCEDURE — 99213 OFFICE O/P EST LOW 20 MIN: CPT | Mod: PBBFAC | Performed by: INTERNAL MEDICINE

## 2017-09-27 PROCEDURE — 1159F MED LIST DOCD IN RCRD: CPT | Mod: ,,, | Performed by: INTERNAL MEDICINE

## 2017-09-27 PROCEDURE — 3078F DIAST BP <80 MM HG: CPT | Mod: ,,, | Performed by: INTERNAL MEDICINE

## 2017-09-27 NOTE — PROGRESS NOTES
Subjective:       Patient ID: Hu Lopez is a 81 y.o. male.    Chief Complaint: nose swelling (red dot in center of nose, it hurts a bit.)    HPI       Patient is a 81 year old male here today for bump on the tip of his nose.  Noticed in a couple of days ago. No trauma or bites reported.  Was tender initially but no longer hurts. Not getting larger in size. It is red and slightly swollen at the tip. He does clip his nose hair and was worried he may have caused irritation to the skin but no bleeding/discharge on pain on inside of the nostrils bilaterally. No fever/chills. Has applied bactroban ointment without improvement to the skin.  No other lesions on the face.     Review of Systems   Constitutional: Negative for chills and fever.   Skin: Positive for rash.       Objective:      Physical Exam   Constitutional: He is oriented to person, place, and time. He appears well-developed and well-nourished. No distress.   HENT:   Head: Normocephalic and atraumatic.   Neurological: He is alert and oriented to person, place, and time.   Skin: Skin is warm and dry. He is not diaphoretic.   Nursing note and vitals reviewed.        Skin:  Erythematous macule noted to the tip of the nose  No vesicular lesion  No fluctuance palpable below the skin  Nares examined which are normal, no erythema, no drainage  Assessment:       1. Skin lesion        Plan:       Patient has an erythematous macular lesion on the tip of his nose, when palpated, the skin is hard underneath, does not appear vesicular and there is no fluctuance to drain. I suspect this is likely a form of cystic acne based on its appearance today however discussed with patient nothing topical or oral antibiotic is needed at this time, trial of warm compress several times a day to help with erythema/swelling is okay and he should notify me if this changes in appearance. Was concerned about shingles today however has no pain and no vesicular appearance and is a  single macule at this time. If this changes, he should come in to clinic for evaluation.

## 2017-09-29 ENCOUNTER — OFFICE VISIT (OUTPATIENT)
Dept: PODIATRY | Facility: CLINIC | Age: 82
End: 2017-09-29
Payer: MEDICARE

## 2017-09-29 ENCOUNTER — TELEPHONE (OUTPATIENT)
Dept: INTERNAL MEDICINE | Facility: CLINIC | Age: 82
End: 2017-09-29

## 2017-09-29 VITALS — WEIGHT: 188 LBS | BODY MASS INDEX: 26.32 KG/M2 | HEIGHT: 71 IN

## 2017-09-29 DIAGNOSIS — B35.1 ONYCHOMYCOSIS DUE TO DERMATOPHYTE: Primary | ICD-10-CM

## 2017-09-29 PROCEDURE — 1126F AMNT PAIN NOTED NONE PRSNT: CPT | Mod: ,,, | Performed by: PODIATRIST

## 2017-09-29 PROCEDURE — 99213 OFFICE O/P EST LOW 20 MIN: CPT | Mod: PBBFAC | Performed by: PODIATRIST

## 2017-09-29 PROCEDURE — 99999 PR PBB SHADOW E&M-EST. PATIENT-LVL III: CPT | Mod: PBBFAC,,, | Performed by: PODIATRIST

## 2017-09-29 PROCEDURE — 1159F MED LIST DOCD IN RCRD: CPT | Mod: ,,, | Performed by: PODIATRIST

## 2017-09-29 PROCEDURE — 99203 OFFICE O/P NEW LOW 30 MIN: CPT | Mod: S$PBB,,, | Performed by: PODIATRIST

## 2017-09-29 RX ORDER — CICLOPIROX 80 MG/ML
SOLUTION TOPICAL NIGHTLY
Qty: 6.6 ML | Refills: 11 | Status: SHIPPED | OUTPATIENT
Start: 2017-09-29 | End: 2020-02-03 | Stop reason: SDUPTHER

## 2017-09-29 NOTE — TELEPHONE ENCOUNTER
----- Message from Warner Coronado sent at 9/29/2017 10:44 AM CDT -----  Contact: self/ [phone] cell  Pt is calling to speak with Dr. French about the growth on his nose.  He states that the lesion is still reddish and has a white tip on the end of it.  He would like to discuss getting something sent in to the [facility]'s on [address], if possible.  Please call and advise.    Thank you

## 2017-09-29 NOTE — PROGRESS NOTES
Subjective:      Patient ID: Hu Lopez is a 81 y.o. male.    Chief Complaint: Nail Problem    Hu is a 81 y.o. male who presents to the clinic complaining of thick and discolored toenails on both feet. Gradual onset, worsening over past several weeks, aggravated by increased weight bearing, shoe gear, pressure.  No previous medical treatment.  OTC med not helping.     Hu is inquiring about treatment options.      Review of Systems   Constitution: Negative for chills, diaphoresis, fever, malaise/fatigue and night sweats.   Cardiovascular: Negative for claudication, cyanosis, leg swelling and syncope.   Skin: Positive for nail changes. Negative for color change, dry skin, rash, suspicious lesions and unusual hair distribution.   Musculoskeletal: Negative for falls, joint pain, joint swelling, muscle cramps, muscle weakness and stiffness.   Gastrointestinal: Negative for constipation, diarrhea, nausea and vomiting.   Neurological: Negative for brief paralysis, disturbances in coordination, focal weakness, numbness, paresthesias, sensory change and tremors.           Objective:      Physical Exam   Constitutional: He appears well-developed and well-nourished. He is cooperative. No distress.   Cardiovascular:   Pulses:       Popliteal pulses are 2+ on the right side, and 2+ on the left side.        Dorsalis pedis pulses are 1+ on the right side, and 1+ on the left side.        Posterior tibial pulses are 1+ on the right side, and 1+ on the left side.   Capillary refill 3 seconds all toes/distal feet, all toes/both feet warm to touch.      Negative lymphadenopathy bilateral popliteal fossa and tarsal tunnel.      Negavie lower extremity edema bilateral.     Musculoskeletal:        Right ankle: Normal. He exhibits normal range of motion, no swelling, no ecchymosis, no deformity, no laceration and normal pulse. Achilles tendon normal. Achilles tendon exhibits no pain, no defect and normal Ortiz's test  results.   Normal angle, base, station of gait. All ten toes without clubbing, cyanosis, or signs of ischemia.  No pain to palpation bilateral lower extremities.  Range of motion, stability, muscle strength, and muscle tone normal bilateral feet and legs.     Lymphadenopathy: No inguinal adenopathy noted on the right or left side.   Negative lymphadenopathy bilateral popliteal fossa and tarsal tunnel.   Neurological: He is alert. He has normal strength. He displays no atrophy and no tremor. No sensory deficit. He exhibits normal muscle tone. He displays no seizure activity. Gait normal.   Reflex Scores:       Patellar reflexes are 2+ on the right side and 2+ on the left side.       Achilles reflexes are 2+ on the right side and 2+ on the left side.  Negative tinel sign to percussion sural, superficial peroneal, deep peroneal, saphenous, and posterior tibial nerves right and left ankles and feet.     Skin: Skin is warm, dry and intact. No abrasion, no bruising, no burn, no ecchymosis, no laceration, no lesion and no rash noted. He is not diaphoretic. No cyanosis or erythema. No pallor. Nails show no clubbing.     Toenails 1st, 2nd, 3rd, 4th, 5th  bilateral are hypertrophic thickened 2-3 mm, dystrophic, discolored tanish brown with tan, gray crumbly subungual debris.  Neatly trimmed and not tender to distal nail plate pressure, without periungual skin abnormality of each.    Otherwise, Skin is normal age and health appropriate color, turgor, texture, and temperature bilateral lower extremities without ulceration, hyperpigmentation, discoloration, masses nodules or cords palpated.  No ecchymosis, erythema, edema, or cardinal signs of infection bilateral lower extremities.               Assessment:       No diagnosis found.      Plan:       There are no diagnoses linked to this encounter.  I counseled the patient on his conditions, their implications and medical management.        - Shoe inspection. Patient instructed  on proper foot hygeine. We discussed wearing proper shoe gear, daily foot inspections, never walking without protective shoe gear, never putting sharp instruments to feet, routine podiatric nail visits prn.    Discussed conservative treatment with shoes of adequate dimensions, material, and style to alleviate symptoms and delay or prevent surgical intervention.    Rx penlac.             Return if symptoms worsen or fail to improve.

## 2017-11-08 ENCOUNTER — TELEPHONE (OUTPATIENT)
Dept: UROLOGY | Facility: CLINIC | Age: 82
End: 2017-11-08

## 2017-11-08 NOTE — TELEPHONE ENCOUNTER
----- Message from Carri Tinoco MA sent at 11/7/2017  3:38 PM CST -----  Contact: Home: [phone]   Home: [phone]   Asking to see Dr Canas for urinary problems and said his PSA is high, last one was in June.

## 2018-01-05 ENCOUNTER — PATIENT MESSAGE (OUTPATIENT)
Dept: INTERNAL MEDICINE | Facility: CLINIC | Age: 83
End: 2018-01-05

## 2018-01-08 ENCOUNTER — TELEPHONE (OUTPATIENT)
Dept: INTERNAL MEDICINE | Facility: CLINIC | Age: 83
End: 2018-01-08

## 2018-01-08 ENCOUNTER — OFFICE VISIT (OUTPATIENT)
Dept: INTERNAL MEDICINE | Facility: CLINIC | Age: 83
End: 2018-01-08
Payer: MEDICARE

## 2018-01-08 ENCOUNTER — LAB VISIT (OUTPATIENT)
Dept: LAB | Facility: HOSPITAL | Age: 83
End: 2018-01-08
Attending: UROLOGY
Payer: MEDICARE

## 2018-01-08 VITALS
HEART RATE: 66 BPM | SYSTOLIC BLOOD PRESSURE: 116 MMHG | HEIGHT: 71 IN | BODY MASS INDEX: 26.29 KG/M2 | DIASTOLIC BLOOD PRESSURE: 66 MMHG | WEIGHT: 187.81 LBS

## 2018-01-08 DIAGNOSIS — G47.33 OSA ON CPAP: ICD-10-CM

## 2018-01-08 DIAGNOSIS — Z85.46 HISTORY OF PROSTATE CANCER: ICD-10-CM

## 2018-01-08 DIAGNOSIS — R35.1 NOCTURIA: ICD-10-CM

## 2018-01-08 DIAGNOSIS — J10.1 INFLUENZA A: Primary | ICD-10-CM

## 2018-01-08 DIAGNOSIS — L98.9 SKIN LESION: ICD-10-CM

## 2018-01-08 DIAGNOSIS — E78.5 HYPERLIPIDEMIA, UNSPECIFIED HYPERLIPIDEMIA TYPE: ICD-10-CM

## 2018-01-08 DIAGNOSIS — F41.9 ANXIETY: ICD-10-CM

## 2018-01-08 DIAGNOSIS — Z87.19 HISTORY OF DIVERTICULITIS: ICD-10-CM

## 2018-01-08 DIAGNOSIS — C61 PROSTATE CANCER: ICD-10-CM

## 2018-01-08 DIAGNOSIS — I10 ESSENTIAL HYPERTENSION: ICD-10-CM

## 2018-01-08 DIAGNOSIS — J06.9 VIRAL URI WITH COUGH: Primary | ICD-10-CM

## 2018-01-08 DIAGNOSIS — K21.9 GASTROESOPHAGEAL REFLUX DISEASE, ESOPHAGITIS PRESENCE NOT SPECIFIED: ICD-10-CM

## 2018-01-08 LAB
COMPLEXED PSA SERPL-MCNC: 0.4 NG/ML
FLUAV AG SPEC QL IA: POSITIVE
FLUBV AG SPEC QL IA: NEGATIVE
SPECIMEN SOURCE: ABNORMAL

## 2018-01-08 PROCEDURE — 99214 OFFICE O/P EST MOD 30 MIN: CPT | Mod: PBBFAC | Performed by: INTERNAL MEDICINE

## 2018-01-08 PROCEDURE — 84153 ASSAY OF PSA TOTAL: CPT

## 2018-01-08 PROCEDURE — 99214 OFFICE O/P EST MOD 30 MIN: CPT | Mod: S$PBB,,, | Performed by: INTERNAL MEDICINE

## 2018-01-08 PROCEDURE — 36415 COLL VENOUS BLD VENIPUNCTURE: CPT

## 2018-01-08 PROCEDURE — 99999 PR PBB SHADOW E&M-EST. PATIENT-LVL IV: CPT | Mod: PBBFAC,,, | Performed by: INTERNAL MEDICINE

## 2018-01-08 PROCEDURE — 87400 INFLUENZA A/B EACH AG IA: CPT | Mod: 59

## 2018-01-08 RX ORDER — SERTRALINE HYDROCHLORIDE 50 MG/1
TABLET, FILM COATED ORAL
Qty: 30 TABLET | Refills: 1 | Status: CANCELLED | OUTPATIENT
Start: 2018-01-08

## 2018-01-08 RX ORDER — SERTRALINE HYDROCHLORIDE 100 MG/1
TABLET, FILM COATED ORAL
Qty: 90 TABLET | Refills: 0 | Status: SHIPPED | OUTPATIENT
Start: 2018-01-08 | End: 2018-01-09 | Stop reason: SDUPTHER

## 2018-01-08 RX ORDER — OSELTAMIVIR PHOSPHATE 75 MG/1
75 CAPSULE ORAL 2 TIMES DAILY
Qty: 10 CAPSULE | Refills: 0 | Status: SHIPPED | OUTPATIENT
Start: 2018-01-08 | End: 2018-01-09 | Stop reason: SDUPTHER

## 2018-01-08 NOTE — PATIENT INSTRUCTIONS
Blood pressure:  Stop Norvasc (amlopdipine)  Schedule appointment with Dr. Mina to discuss blood pressure.  Bring records from Dr. Mina to next appointment.      Cough:  Dextromethorphan (suppressant) + Guaifenesin (expectorant) = Mucinex DM.  Call me if symptoms worsen, new symptoms develop, or if you start running a fever (greater than or equal to 100.4F)

## 2018-01-08 NOTE — PROGRESS NOTES
Internal Medicine    Subjective:      Patient ID: Hu Lopez is a 82 y.o. male.    Chief Complaint: Follow-up (PARVEEN(generalized anziety disorder)); Cough; and Headache    Presents for follow up appointment.  Last seen in Internal Medicine on 7/24/17.  Seen once in Psychiatry on 8/16/17 for anxiety.      Anxiety:  Has been taking Zoloft 50mg daily.  Ran out so has not taken the last few weeks.  Says it was possibly helping some.      Cough:  Started 1 week ago.  Sometimes coughs up mucous.  Runny nose for months - says this is chronic allergies.  Has post-nasal drip.  No sinus congestion.  No fevers or night sweats.  Some chills when coughing.  No chest pain or shortness of breath.     Headaches:  Occasionally has headache in the mornings.  Occurs maybe 2 times a week.  Takes Tylenol and it goes away.  Has been happening for 5-6 months.  Saw Ophthalmologist and they did 24 hour blood pressures which showed blood pressure is getting low at night.  Planning to see Cardiologist back home about this in the next week.       HTN:  Taking Norvasc 2.5mg daily and Toprol 100mg daily.  Taking ASA 81mg daily.  Followed by Cardiologist, Dr. Tom Mina, in [facility].  Unsure of official cardiac diagnoses.  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  Denies CAD or history of MI.  Have requested records but not received.      HLD:  Taking Lipitor 10mg daily.    H/o prostate cancer, nocturnal polyuria:  S/p radical retropubic prostatectomy in 2008.  Followed by Urology (Dr. Canas) - last seen 9/2016.  Bone scan and CT scan in 2/2016 did not show metastatic disease.  Also saw Dr. Rothman on 8/25/17 - Oxybutynin XL increased to 10mg daily.  Also said patient could consider botox injection or sacral nerve stimulation.     GERD, nausea:  Has always struggled with abdominal discomfort symptoms.  Taking Prevacid 15mg daily and Carafate PRN.  For nausea takes Zofran PRN - maybe 1-3 times per month.  Followed by Dr. Pedro with GI - has  "had EGD 12/2015 and colonoscopy 6/2011.  Due for repeat colonoscopy - planning to call and schedule.  Symptoms are believed to be anxiety related.      H/o diverticulitis:  S/p multiple surgeries - 1977 and 2005.  In ICU in [place] around [place].  Had colostomy for ~1 year.  Then had reversal.  No recent disease.      Vision:  Blind in left eye.  Saw retina specialist recently, Dr. Jackson.      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  Has occasional pain x 1-2 years.  Family says he has been limping some recently.     CALVIN:  Sometimes wears CPAP.        Review of Systems   Constitutional: Negative for chills and fever.   HENT: Positive for congestion, postnasal drip and rhinorrhea.    Eyes: Negative for visual disturbance.   Respiratory: Positive for cough. Negative for chest tightness, shortness of breath and wheezing.    Cardiovascular: Negative for chest pain, palpitations and leg swelling.   Gastrointestinal: Negative for abdominal pain, blood in stool, constipation, diarrhea, nausea and vomiting.   Genitourinary: Negative for difficulty urinating.   Musculoskeletal: Negative for back pain and myalgias.   Skin: Negative for rash.   Neurological: Positive for headaches (As per HPI). Negative for dizziness and weakness.   Psychiatric/Behavioral: Negative for confusion.       Past medical history, surgical history, and family medical history reviewed and updated as appropriate.    Medications and allergies reviewed.     Objective:     Vitals:    01/08/18 0859   BP: 116/66   Pulse: 66   Weight: 85.2 kg (187 lb 13.3 oz)   Height: 5' 11" (1.803 m)     Physical Exam    RESULTS:   Lab Results   Component Value Date    WBC 6.21 06/20/2017    HGB 14.8 06/20/2017    HCT 44.0 06/20/2017    MCV 94 06/20/2017     06/20/2017     BMP  Lab Results   Component Value Date     06/20/2017    K 4.9 06/20/2017     06/20/2017    CO2 24 06/20/2017    BUN 21 06/20/2017    CREATININE 1.2 " 06/20/2017    CALCIUM 9.5 06/20/2017    ANIONGAP 10 06/20/2017    ESTGFRAFRICA >60.0 06/20/2017    EGFRNONAA 56.4 (A) 06/20/2017     Lab Results   Component Value Date    ALT 24 06/20/2017    AST 25 06/20/2017    ALKPHOS 62 06/20/2017    BILITOT 0.7 06/20/2017     Lab Results   Component Value Date    TSH 1.131 06/20/2017     Lab Results   Component Value Date    HGBA1C 5.5 06/20/2017         Assessment:     1. Viral URI with cough    2. Anxiety    3. Skin lesion    4. Essential hypertension    5. Hyperlipidemia, unspecified hyperlipidemia type    6. Nocturia    7. History of prostate cancer    8. Gastroesophageal reflux disease, esophagitis presence not specified    9. History of diverticulitis    10. CALVIN on CPAP        Plan:   Hu was seen today for follow-up, cough and headache.    Diagnoses and all orders for this visit:    Viral URI with cough  -     Influenza antigen Nasopharyngeal Swab    Anxiety  -     sertraline (ZOLOFT) 100 MG tablet; Take 50mg (1/2 tablet) daily x 1 week.  If tolerating the medication, then increase to 100mg (1 tablet) daily.    Skin lesion  -     Ambulatory Referral to Dermatology    Essential hypertension  Hyperlipidemia, unspecified hyperlipidemia type   Continue medication/plan as discussed in HPI.  Followed by Cardiologist in [place].    Nocturia  History of prostate cancer   Followed by Urology.    Gastroesophageal reflux disease, esophagitis presence not specified  History of diverticulitis    CALVIN on CPAP    Health maintenance reviewed with patient.     Return in about 2 months (around 3/8/2018).    Komal Talbot MD  Internal Medicine  [facility]

## 2018-01-08 NOTE — TELEPHONE ENCOUNTER
Please call patient and let him know that his Flu test was positive.  I am sending in a prescription for Tamiflu to [facility] - he will take this twice a day for 5 days.

## 2018-01-08 NOTE — TELEPHONE ENCOUNTER
Spoke with pt and he understood his results. Pt will  medication an start taking medicaiton twice a day for 5 days

## 2018-01-09 ENCOUNTER — TELEPHONE (OUTPATIENT)
Dept: INTERNAL MEDICINE | Facility: CLINIC | Age: 83
End: 2018-01-09

## 2018-01-09 ENCOUNTER — TELEPHONE (OUTPATIENT)
Dept: GASTROENTEROLOGY | Facility: CLINIC | Age: 83
End: 2018-01-09

## 2018-01-09 DIAGNOSIS — K30 NUD (NONULCER DYSPEPSIA): ICD-10-CM

## 2018-01-09 DIAGNOSIS — F41.9 ANXIETY: ICD-10-CM

## 2018-01-09 DIAGNOSIS — J10.1 INFLUENZA A: ICD-10-CM

## 2018-01-09 RX ORDER — SERTRALINE HYDROCHLORIDE 100 MG/1
TABLET, FILM COATED ORAL
Qty: 90 TABLET | Refills: 0 | Status: SHIPPED | OUTPATIENT
Start: 2018-01-09 | End: 2018-07-10 | Stop reason: SDUPTHER

## 2018-01-09 RX ORDER — ONDANSETRON 4 MG/1
4 TABLET, FILM COATED ORAL EVERY 8 HOURS PRN
Qty: 30 TABLET | Refills: 3 | Status: SHIPPED | OUTPATIENT
Start: 2018-01-09 | End: 2019-01-25 | Stop reason: SDUPTHER

## 2018-01-09 RX ORDER — OSELTAMIVIR PHOSPHATE 75 MG/1
75 CAPSULE ORAL 2 TIMES DAILY
Qty: 10 CAPSULE | Refills: 0 | Status: SHIPPED | OUTPATIENT
Start: 2018-01-09 | End: 2018-01-14

## 2018-01-09 NOTE — TELEPHONE ENCOUNTER
Left message for pt to call back. Need to know what meds need to be send to [facility]'s pharmacy.

## 2018-01-09 NOTE — TELEPHONE ENCOUNTER
----- Message from July Preciado MA sent at 1/9/2018  7:58 AM CST -----  Contact: Pt  The [facility] he is referring to is listed 2nd in EPIC.  ----- Message -----  From: Jackelyn López  Sent: 1/8/2018   5:40 PM  To: Willis MAY Staff    Pt says the medication was sent to the wrong [facility]    ondansetron (ZOFRAN) 4 MG tablet  oseltamivir (TAMIFLU) 75 MG capsule    Pt is requesting the the medication be sent to [address] [facility]    Pt is requesting a callback [phone]    Thanks

## 2018-01-09 NOTE — TELEPHONE ENCOUNTER
Dr Talbot, please send Zoloft script to the new pharmacy. Also, pt states that you have prescribed Tamiflu, and I don't see it in medcard. He would like to sent that one to the [facility]'s pharmacy as well.

## 2018-01-09 NOTE — TELEPHONE ENCOUNTER
----- Message from Gladys Stokes sent at 1/9/2018  8:08 AM CST -----  Contact: pt [phone]  Pt would like a call from the nurse pt said he need his script sent to [facility] in [place],please advise pt he was seen on yesterday.

## 2018-01-09 NOTE — TELEPHONE ENCOUNTER
Both of these prescriptions were sent to [facility] yesterday but I have re-sent them just to make sure the pharmacy is getting them.

## 2018-01-09 NOTE — TELEPHONE ENCOUNTER
----- Message from Óscar Pedro MD sent at 1/9/2018  8:16 AM CST -----  Contact: Pt  I can do the zofran, but tamiflu should be from his primary  ----- Message -----  From: July Preciado MA  Sent: 1/9/2018   7:58 AM  To: Óscar Pedro MD    The [facility] he is referring to is listed 2nd in EPIC.  ----- Message -----  From: Jackelyn López  Sent: 1/8/2018   5:40 PM  To: Willis MAY Staff    Pt says the medication was sent to the wrong [facility]    ondansetron (ZOFRAN) 4 MG tablet  oseltamivir (TAMIFLU) 75 MG capsule    Pt is requesting the the medication be sent to [address] at the [facility]    Pt is requesting a callback [phone]    Thanks

## 2018-01-11 ENCOUNTER — OFFICE VISIT (OUTPATIENT)
Dept: UROLOGY | Facility: CLINIC | Age: 83
End: 2018-01-11
Payer: MEDICARE

## 2018-01-11 VITALS
HEART RATE: 72 BPM | WEIGHT: 187.38 LBS | DIASTOLIC BLOOD PRESSURE: 79 MMHG | BODY MASS INDEX: 26.23 KG/M2 | RESPIRATION RATE: 16 BRPM | HEIGHT: 71 IN | SYSTOLIC BLOOD PRESSURE: 171 MMHG

## 2018-01-11 DIAGNOSIS — C61 PROSTATE CANCER: ICD-10-CM

## 2018-01-11 PROCEDURE — 99999 PR PBB SHADOW E&M-EST. PATIENT-LVL III: CPT | Mod: PBBFAC,,, | Performed by: UROLOGY

## 2018-01-11 PROCEDURE — 99213 OFFICE O/P EST LOW 20 MIN: CPT | Mod: PBBFAC | Performed by: UROLOGY

## 2018-01-11 PROCEDURE — 99214 OFFICE O/P EST MOD 30 MIN: CPT | Mod: S$PBB,,, | Performed by: UROLOGY

## 2018-01-11 NOTE — PROGRESS NOTES
Clinic Note  1/11/2018      Subjective:         Chief Complaint:   HPI  Hu Lopez is a 82 y.o. male with a history of prostate cancer.  radical retropubic prostatectomy in 1980 by Dr. Mitchell. Rising PSA. Bone scan and CT scan in February 2016 did not show metastatic disease.  Patient complains of nocturia 4-5x/noc for last month. Started on Ditropan XL a year ago per symptoms improved.      Lab Results   Component Value Date    PSA 0.34 06/20/2017    PSA 0.02 01/19/2012    PSADIAG 0.40 01/08/2018    PSADIAG 0.26 09/21/2016    PSADIAG 0.22 01/26/2016      Past Medical History:   Diagnosis Date    Allergy     BPH (benign prostatic hyperplasia)     CAD (coronary artery disease)     Cataracts, bilateral     Diverticulitis     DVT (deep venous thrombosis)     Elevated PSA     GERD (gastroesophageal reflux disease)     Glaucoma     Hyperlipidemia     Hypertension     Prostate cancer     Skin cancer     Sleep apnea     CPAP     Family History   Problem Relation Age of Onset    Prostate cancer Father     Colon cancer Paternal Uncle     Stroke Paternal Uncle     Depression Paternal Uncle     No Known Problems Mother     Heart attack Brother 70     Social History     Social History    Marital status:      Spouse name: N/A    Number of children: N/A    Years of education: N/A     Occupational History    Not on file.     Social History Main Topics    Smoking status: Never Smoker    Smokeless tobacco: Never Used    Alcohol use 0.0 oz/week      Comment: rare    Drug use: No    Sexual activity: No     Other Topics Concern    Not on file     Social History Narrative    3 children - healthy.     Past Surgical History:   Procedure Laterality Date    APPENDECTOMY      COLON SURGERY      75,05    COLONOSCOPY      11    COLOSTOMY      PROSTATE SURGERY  2008    REVISION COLOSTOMY      SKIN CANCER EXCISION      chin    UPPER GASTROINTESTINAL ENDOSCOPY      11     Patient Active Problem  "List   Diagnosis    History of diverticulitis    GERD (gastroesophageal reflux disease)    CALVIN on CPAP    Nausea    Encysted hydrocele    History of prostate cancer    Nocturia    Essential hypertension    HLD (hyperlipidemia)    PARVEEN (generalized anxiety disorder)    Rash of face    Urgency of urination     Review of Systems   Constitutional: Positive for fatigue. Negative for appetite change, chills, fever and unexpected weight change.   HENT: Negative for nosebleeds.    Respiratory: Negative for shortness of breath and wheezing.    Cardiovascular: Negative for chest pain, palpitations and leg swelling.   Gastrointestinal: Negative for abdominal distention, abdominal pain, constipation, diarrhea, nausea and vomiting.   Genitourinary: Positive for nocturia. Negative for dysuria and hematuria.   Musculoskeletal: Negative for arthralgias and back pain.   Skin: Negative for pallor.   Neurological: Negative for dizziness, seizures and syncope.   Hematological: Negative for adenopathy.   Psychiatric/Behavioral: Negative for dysphoric mood.         Objective:      BP (!) 171/79   Pulse 72   Resp 16   Ht 5' 11" (1.803 m)   Wt 85 kg (187 lb 6.3 oz)   BMI 26.14 kg/m²   Estimated body mass index is 26.14 kg/m² as calculated from the following:    Height as of this encounter: 5' 11" (1.803 m).    Weight as of this encounter: 85 kg (187 lb 6.3 oz).  Physical Exam   Constitutional: He is oriented to person, place, and time. He appears well-developed and well-nourished. No distress.   HENT:   Head: Atraumatic.   Neck: No tracheal deviation present.   Cardiovascular: Normal rate.    Pulmonary/Chest: Effort normal. No respiratory distress. He has no wheezes.   Abdominal: Soft. Bowel sounds are normal. He exhibits no distension and no mass. There is no tenderness. There is no rebound and no guarding.   Genitourinary: Testes normal and penis normal. Uncircumcised. No phimosis, paraphimosis or penile erythema. No " discharge found.   Genitourinary Comments: Left hydrocele   Neurological: He is alert and oriented to person, place, and time.   Skin: Skin is warm and dry. He is not diaphoretic.     Psychiatric: He has a normal mood and affect. His behavior is normal. Judgment and thought content normal.         Assessment and Plan:           Problem List Items Addressed This Visit     None          Follow up:   6 months with PSA.    Royal Canas

## 2018-02-23 ENCOUNTER — TELEPHONE (OUTPATIENT)
Dept: INTERNAL MEDICINE | Facility: CLINIC | Age: 83
End: 2018-02-23

## 2018-02-23 NOTE — TELEPHONE ENCOUNTER
----- Message from Sagar Braswell sent at 2/23/2018  1:08 PM CST -----  Contact: Pt at [phone]  Pt would like to know when was his most recent flu injection because he would like to schedule one ASAP. Please call/advise.

## 2018-02-23 NOTE — TELEPHONE ENCOUNTER
Spoke with pt and he stated that he will go to his local pharmacy check with them to see if he got the flu shot there

## 2018-03-27 DIAGNOSIS — R35.1 NOCTURIA: ICD-10-CM

## 2018-03-28 RX ORDER — OXYBUTYNIN CHLORIDE 10 MG/1
TABLET, EXTENDED RELEASE ORAL
Qty: 30 TABLET | Refills: 11 | Status: SHIPPED | OUTPATIENT
Start: 2018-03-28 | End: 2019-05-07 | Stop reason: SDUPTHER

## 2018-07-10 DIAGNOSIS — F41.9 ANXIETY: ICD-10-CM

## 2018-07-10 RX ORDER — SERTRALINE HYDROCHLORIDE 100 MG/1
100 TABLET, FILM COATED ORAL DAILY
Qty: 90 TABLET | Refills: 3 | Status: SHIPPED | OUTPATIENT
Start: 2018-07-10 | End: 2019-11-18 | Stop reason: SDUPTHER

## 2018-07-13 ENCOUNTER — TELEPHONE (OUTPATIENT)
Dept: INTERNAL MEDICINE | Facility: CLINIC | Age: 83
End: 2018-07-13

## 2018-07-13 ENCOUNTER — CLINICAL SUPPORT (OUTPATIENT)
Dept: INTERNAL MEDICINE | Facility: CLINIC | Age: 83
End: 2018-07-13
Payer: MEDICARE

## 2018-07-13 ENCOUNTER — LAB VISIT (OUTPATIENT)
Dept: LAB | Facility: HOSPITAL | Age: 83
End: 2018-07-13
Attending: INTERNAL MEDICINE
Payer: MEDICARE

## 2018-07-13 ENCOUNTER — OFFICE VISIT (OUTPATIENT)
Dept: INTERNAL MEDICINE | Facility: CLINIC | Age: 83
End: 2018-07-13
Payer: MEDICARE

## 2018-07-13 VITALS
WEIGHT: 194.44 LBS | BODY MASS INDEX: 27.22 KG/M2 | SYSTOLIC BLOOD PRESSURE: 124 MMHG | OXYGEN SATURATION: 95 % | HEIGHT: 71 IN | DIASTOLIC BLOOD PRESSURE: 72 MMHG | HEART RATE: 56 BPM

## 2018-07-13 DIAGNOSIS — R00.1 BRADYCARDIA: ICD-10-CM

## 2018-07-13 DIAGNOSIS — I10 ESSENTIAL HYPERTENSION: ICD-10-CM

## 2018-07-13 DIAGNOSIS — E78.5 HYPERLIPIDEMIA, UNSPECIFIED HYPERLIPIDEMIA TYPE: ICD-10-CM

## 2018-07-13 DIAGNOSIS — Z91.09 ENVIRONMENTAL ALLERGIES: Primary | ICD-10-CM

## 2018-07-13 DIAGNOSIS — G47.33 OSA ON CPAP: ICD-10-CM

## 2018-07-13 DIAGNOSIS — C61 PROSTATE CANCER: ICD-10-CM

## 2018-07-13 DIAGNOSIS — I77.819 ECTATIC AORTA: ICD-10-CM

## 2018-07-13 DIAGNOSIS — Z12.11 ENCOUNTER FOR FIT (FECAL IMMUNOCHEMICAL TEST) SCREENING: ICD-10-CM

## 2018-07-13 DIAGNOSIS — I70.0 AORTIC ATHEROSCLEROSIS: ICD-10-CM

## 2018-07-13 DIAGNOSIS — R53.83 FATIGUE, UNSPECIFIED TYPE: ICD-10-CM

## 2018-07-13 DIAGNOSIS — K21.9 GASTROESOPHAGEAL REFLUX DISEASE, ESOPHAGITIS PRESENCE NOT SPECIFIED: ICD-10-CM

## 2018-07-13 DIAGNOSIS — Z87.19 HISTORY OF DIVERTICULITIS: ICD-10-CM

## 2018-07-13 DIAGNOSIS — I70.209 ATHEROSCLEROSIS OF ARTERIES OF EXTREMITIES: ICD-10-CM

## 2018-07-13 DIAGNOSIS — F41.1 GAD (GENERALIZED ANXIETY DISORDER): ICD-10-CM

## 2018-07-13 DIAGNOSIS — Z23 NEED FOR PNEUMOCOCCAL VACCINATION: ICD-10-CM

## 2018-07-13 PROBLEM — R21 RASH OF FACE: Status: RESOLVED | Noted: 2017-07-24 | Resolved: 2018-07-13

## 2018-07-13 LAB
ALBUMIN SERPL BCP-MCNC: 4.1 G/DL
ALP SERPL-CCNC: 61 U/L
ALT SERPL W/O P-5'-P-CCNC: 21 U/L
ANION GAP SERPL CALC-SCNC: 5 MMOL/L
AST SERPL-CCNC: 23 U/L
BASOPHILS # BLD AUTO: 0.03 K/UL
BASOPHILS NFR BLD: 0.5 %
BILIRUB SERPL-MCNC: 0.6 MG/DL
BUN SERPL-MCNC: 20 MG/DL
CALCIUM SERPL-MCNC: 9.6 MG/DL
CHLORIDE SERPL-SCNC: 104 MMOL/L
CHOLEST SERPL-MCNC: 165 MG/DL
CHOLEST/HDLC SERPL: 3.8 {RATIO}
CO2 SERPL-SCNC: 28 MMOL/L
COMPLEXED PSA SERPL-MCNC: 0.59 NG/ML
CREAT SERPL-MCNC: 1.4 MG/DL
DIFFERENTIAL METHOD: ABNORMAL
EOSINOPHIL # BLD AUTO: 0.2 K/UL
EOSINOPHIL NFR BLD: 2.5 %
ERYTHROCYTE [DISTWIDTH] IN BLOOD BY AUTOMATED COUNT: 13.1 %
EST. GFR  (AFRICAN AMERICAN): 54 ML/MIN/1.73 M^2
EST. GFR  (NON AFRICAN AMERICAN): 46 ML/MIN/1.73 M^2
GLUCOSE SERPL-MCNC: 102 MG/DL
HCT VFR BLD AUTO: 43 %
HDLC SERPL-MCNC: 43 MG/DL
HDLC SERPL: 26.1 %
HGB BLD-MCNC: 14.1 G/DL
LDLC SERPL CALC-MCNC: 101 MG/DL
LYMPHOCYTES # BLD AUTO: 1.5 K/UL
LYMPHOCYTES NFR BLD: 24.9 %
MCH RBC QN AUTO: 30.5 PG
MCHC RBC AUTO-ENTMCNC: 32.8 G/DL
MCV RBC AUTO: 93 FL
MONOCYTES # BLD AUTO: 0.7 K/UL
MONOCYTES NFR BLD: 11.5 %
NEUTROPHILS # BLD AUTO: 3.6 K/UL
NEUTROPHILS NFR BLD: 60.4 %
NONHDLC SERPL-MCNC: 122 MG/DL
PLATELET # BLD AUTO: 263 K/UL
PMV BLD AUTO: 9.1 FL
POTASSIUM SERPL-SCNC: 5.2 MMOL/L
PROT SERPL-MCNC: 7 G/DL
RBC # BLD AUTO: 4.63 M/UL
SODIUM SERPL-SCNC: 137 MMOL/L
TRIGL SERPL-MCNC: 105 MG/DL
TSH SERPL DL<=0.005 MIU/L-ACNC: 0.97 UIU/ML
WBC # BLD AUTO: 5.91 K/UL

## 2018-07-13 PROCEDURE — 84443 ASSAY THYROID STIM HORMONE: CPT

## 2018-07-13 PROCEDURE — 84153 ASSAY OF PSA TOTAL: CPT

## 2018-07-13 PROCEDURE — 80061 LIPID PANEL: CPT

## 2018-07-13 PROCEDURE — 99999 PR PBB SHADOW E&M-EST. PATIENT-LVL IV: CPT | Mod: PBBFAC,,, | Performed by: INTERNAL MEDICINE

## 2018-07-13 PROCEDURE — 80053 COMPREHEN METABOLIC PANEL: CPT

## 2018-07-13 PROCEDURE — 90732 PPSV23 VACC 2 YRS+ SUBQ/IM: CPT | Mod: PBBFAC

## 2018-07-13 PROCEDURE — 36415 COLL VENOUS BLD VENIPUNCTURE: CPT

## 2018-07-13 PROCEDURE — 85025 COMPLETE CBC W/AUTO DIFF WBC: CPT

## 2018-07-13 PROCEDURE — 99214 OFFICE O/P EST MOD 30 MIN: CPT | Mod: PBBFAC,25 | Performed by: INTERNAL MEDICINE

## 2018-07-13 PROCEDURE — 99214 OFFICE O/P EST MOD 30 MIN: CPT | Mod: S$PBB,,, | Performed by: INTERNAL MEDICINE

## 2018-07-13 RX ORDER — ONDANSETRON 4 MG/1
4 TABLET, FILM COATED ORAL
COMMUNITY
End: 2019-01-22 | Stop reason: SDUPTHER

## 2018-07-13 RX ORDER — OMEPRAZOLE 40 MG/1
CAPSULE, DELAYED RELEASE ORAL
COMMUNITY
Start: 2018-06-28 | End: 2022-08-25 | Stop reason: SDUPTHER

## 2018-07-13 RX ORDER — METOPROLOL SUCCINATE 100 MG/1
100 TABLET, EXTENDED RELEASE ORAL DAILY
Qty: 90 TABLET | Refills: 3 | Status: SHIPPED | OUTPATIENT
Start: 2018-07-13 | End: 2022-08-25 | Stop reason: ALTCHOICE

## 2018-07-13 RX ORDER — MULTIVITAMIN
1 TABLET ORAL DAILY
COMMUNITY
End: 2022-09-05 | Stop reason: SDUPTHER

## 2018-07-13 NOTE — PATIENT INSTRUCTIONS
Allergies  1. Flonase (fluticasone) nasal spray, 2. Zyrtec (cetirizine) or Claritin (loratadine)    Blood pressure  1. Stop metoprolol tartrate  2. Start metoprolol succinate 100mg daily.  3. Please check your blood pressure daily and bring blood pressure log and blood pressure cuff to next appointment.    Anxiety  Take Zoloft 50mg daily x 2 weeks.  If tolerating and still not much improvement in anxiety, increase to 100mg daily.    Sleep  1. Go to bed only when sleepy.  Avoid more than 8-9 hours total time in bed.  Make sure the bedroom is dark, quiet and cool.  2. Do not watch television, read, eat, or worry while in bed.  Use bed only for sleep and intimacy.   3. Get out of bed if unable to fall asleep within twenty minutes and go to another room.  Do something different like reading a book.  Dont engage in stimulating activity.  Return to bed only when you are sleepy.  Repeat this step as many times as necessary throughout the night.   4. Avoid stimulating evening activities.  5. Avoid caffeinated products after 5pm.  6. Set an alarm clock to wake up at a fixed time each morning including weekends.  7. Do not take a nap during the day.

## 2018-07-13 NOTE — TELEPHONE ENCOUNTER
Please call patient and let him know that today's labs showed his potassium is a little high at 5.2.  Tell him to stop taking the potassium supplement that he bought at the store.  All other labs looked good.

## 2018-07-13 NOTE — PROGRESS NOTES
Internal Medicine    Subjective:      Patient ID: Hu Lopez is a 82 y.o. male.    Chief Complaint: Follow-up    HPI:  Patient presents for follow up appointment.  The patient's last visit with me was on 1/8/2018.    Environmental allergies, cough:  Says cough has been present off and on for years.  Associated with post-nasal drip, runny nose, and sneezing.  Has not tried medication.  Saw outside ENT, Dr. Rajinder River, who felt GERD was playing and role and prescribed Prilosec 40mg daily.  He also prescribed ipratropium nasal spray.  No fever, chills, night sweats, or weight loss.  No shortness of breath.    Fatigue:  Feeling weak and tired for the last few months.  Friend told him to start taking potassium, so he bought this and is currently taking.    Bradycardia:  Saw Cardiologist several months ago and Metoprolol succinate 100mg daily changed to tartrate 100mg BID.  Denies shortness of breath, chest pain, or palpitations.      Anxiety:  Taking Zoloft 50mg daily.  Stopped Zoloft for several months after last appointment because he does not like taking medication for mood.  He restarted several weeks ago and is still taking 50mg daily.    Insomnia:  Takes Melatonin 10mg qHS.  Also napping during the day and watching TV before bed.    HTN:  Norvasc stopped at last appointment due to reported low blood pressures during sleep.  Saw Cardiologist, Dr. Tom Mina, in [facility] several months ago and Metoprolol succinate 100mg daily changed to tartrate 100mg BID.  Have requested records but not received.  Want to clarify cardiac diagnoses.     HLD:  Taking Lipitor 10mg daily.     H/o prostate cancer, nocturnal polyuria:  S/p radical retropubic prostatectomy in 2008.  Followed by Urology (Dr. Canas) - last seen 1/11/18.  Plan is for follow up in 6 months.  Also seen by Dr. Rothman on 8/25/17 - Oxybutynin XL increased to 10mg daily.  Also said patient could consider botox injection or sacral nerve stimulation.      GERD:   Taking Prilosec 40mg daily (per ENT as above) and Carafate PRN.  No abdominal complaints today.  Has seen GI in the past.  Due for colonoscopy but would prefer FIT test.     H/o diverticulitis:  S/p multiple surgeries - 1977 and 2005.  In ICU in [place] around [facility].  Had colostomy for ~1 year.  Then had reversal.  No recent disease.       Vision:  Blind in left eye.  Saw retina specialist recently, Dr. Jackson.       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  Has occasional pain x 1-2 years.  Family says he has been limping some recently.      CALVIN:  Sometimes wears CPAP.      Review of Systems   Constitutional: Positive for fatigue. Negative for appetite change, chills, fever and unexpected weight change.   HENT: Positive for postnasal drip, rhinorrhea and sneezing. Negative for congestion, sinus pressure, sore throat and trouble swallowing.    Eyes: Negative for visual disturbance.   Respiratory: Positive for cough. Negative for chest tightness, shortness of breath and wheezing.    Cardiovascular: Negative for chest pain, palpitations and leg swelling.   Gastrointestinal: Negative for abdominal pain, blood in stool, constipation, diarrhea, nausea and vomiting.   Genitourinary: Negative for difficulty urinating.   Musculoskeletal: Negative for arthralgias and myalgias.   Skin: Negative for rash and wound.   Neurological: Negative for dizziness, weakness, numbness and headaches.   Psychiatric/Behavioral: Positive for sleep disturbance. Negative for behavioral problems, confusion and dysphoric mood. The patient is nervous/anxious.        Past medical history, surgical history, and family medical history reviewed and updated as appropriate.    Medications and allergies reviewed.     Objective:     Vitals:    07/13/18 0910   BP: 124/72   BP Location: Left arm   Patient Position: Sitting   BP Method: Large (Manual)   Pulse: (!) 56   SpO2: 95%   Weight: 88.2 kg (194 lb 7.1 oz)   Height: 5'  "11" (1.803 m)     Physical Exam   Constitutional: He is oriented to person, place, and time. He appears well-developed and well-nourished. No distress.   HENT:   Head: Normocephalic and atraumatic.   Eyes: Conjunctivae and EOM are normal. No scleral icterus.   Cardiovascular: Normal rate and regular rhythm.  Exam reveals no gallop and no friction rub.    No murmur heard.  Pulmonary/Chest: Effort normal and breath sounds normal. No respiratory distress. He has no wheezes. He has no rales.   Abdominal: Soft. He exhibits no distension. There is no tenderness. There is no guarding.   Musculoskeletal: He exhibits no edema or tenderness.   Neurological: He is alert and oriented to person, place, and time.   Skin: No rash noted. No erythema.   Psychiatric: His behavior is normal. Judgment and thought content normal.   Mood is "good," anxious affect at times.       RESULTS:   Lab Results   Component Value Date    WBC 5.91 07/13/2018    HGB 14.1 07/13/2018    HCT 43.0 07/13/2018    MCV 93 07/13/2018     07/13/2018     BMP  Lab Results   Component Value Date     07/13/2018    K 5.2 (H) 07/13/2018     07/13/2018    CO2 28 07/13/2018    BUN 20 07/13/2018    CREATININE 1.4 07/13/2018    CALCIUM 9.6 07/13/2018    ANIONGAP 5 (L) 07/13/2018    ESTGFRAFRICA 54 (A) 07/13/2018    EGFRNONAA 46 (A) 07/13/2018     Lab Results   Component Value Date    ALT 21 07/13/2018    AST 23 07/13/2018    ALKPHOS 61 07/13/2018    BILITOT 0.6 07/13/2018     Lab Results   Component Value Date    TSH 0.966 07/13/2018     Lab Results   Component Value Date    HGBA1C 5.5 06/20/2017         Assessment:     1. Environmental allergies    2. Fatigue, unspecified type    3. Bradycardia    4. Essential hypertension    5. Hyperlipidemia, unspecified hyperlipidemia type    6. PARVEEN (generalized anxiety disorder)    7. Gastroesophageal reflux disease, esophagitis presence not specified    8. History of diverticulitis    9. CALVIN on CPAP    10. " Prostate cancer    11. Atherosclerosis of arteries of extremities    12. Aortic atherosclerosis    13. Ectatic aorta    14. Need for pneumococcal vaccination    15. Encounter for FIT (fecal immunochemical test) screening        Plan:     *Continue medication/plan as discussed in HPI except for changes discussed below.    Hu was seen today for follow-up.    Diagnoses and all orders for this visit:    Environmental allergies   Discussed recommendation to try Ipratropium nasal spray that was prescribed by ENT.  Also suggest adding Zyrtec.  If Ipratropium not effective, can then try Flonase.    Fatigue, unspecified type   Likely due to bradycardia since timeline is similar.  See below.  -     CBC auto differential; Future; Expected date: 07/13/2018  -     Comprehensive metabolic panel; Future; Expected date: 07/13/2018  -     TSH; Future; Expected date: 07/13/2018    Bradycardia   Stop metoprolol tartrate 100mg BID and restart succinate 100mg daily.      Essential hypertension   Stop metoprolol tartrate 100mg BID and restart succinate 100mg daily.  Instructed patient to check blood pressure daily and bring blood pressure log and blood pressure cuff to next appointment.   -     Comprehensive metabolic panel; Future; Expected date: 07/13/2018    Hyperlipidemia, unspecified hyperlipidemia type  -     Lipid panel; Future; Expected date: 07/13/2018    PARVEEN (generalized anxiety disorder)   Increase Zoloft to 100mg daily.      Sleep disturbance   Discussed sleep hygiene    Gastroesophageal reflux disease, esophagitis presence not specified    History of diverticulitis    CALVIN on CPAP    Prostate cancer  -     PROSTATE SPECIFIC ANTIGEN, DIAGNOSTIC; Future; Expected date: 07/13/2018    Atherosclerosis of arteries of extremities  Aortic atherosclerosis  Ectatic aorta  -     Lipid panel; Future; Expected date: 07/13/2018    Need for pneumococcal vaccination  -     Pneumococcal Polysaccharide Vaccine (23 Valent)  (SQ/IM)    Encounter for FIT (fecal immunochemical test) screening  -     Fecal Immunochemical Test (iFOBT); Future; Expected date: 07/13/2018    Health maintenance reviewed with patient.     Follow-up in about 4 weeks (around 8/10/2018).    Komal Talbot MD  Internal Medicine  [facility]

## 2018-07-17 ENCOUNTER — TELEPHONE (OUTPATIENT)
Dept: DERMATOLOGY | Facility: CLINIC | Age: 83
End: 2018-07-17

## 2018-07-17 NOTE — TELEPHONE ENCOUNTER
----- Message from Nallely Pritchard sent at 7/17/2018  2:58 PM CDT -----  Contact: patient   Needs Advice    Reason for call: Patient is calling to reschedule is appt from 07/20 to sometime in Aug. Please contact patient to advise.        Communication Preference: [phone] or [phone]

## 2018-07-19 ENCOUNTER — OFFICE VISIT (OUTPATIENT)
Dept: UROLOGY | Facility: CLINIC | Age: 83
End: 2018-07-19
Payer: MEDICARE

## 2018-07-19 ENCOUNTER — LAB VISIT (OUTPATIENT)
Dept: LAB | Facility: HOSPITAL | Age: 83
End: 2018-07-19
Attending: UROLOGY
Payer: MEDICARE

## 2018-07-19 VITALS
HEIGHT: 71 IN | DIASTOLIC BLOOD PRESSURE: 89 MMHG | HEART RATE: 58 BPM | RESPIRATION RATE: 16 BRPM | BODY MASS INDEX: 25.34 KG/M2 | SYSTOLIC BLOOD PRESSURE: 189 MMHG | WEIGHT: 181 LBS

## 2018-07-19 DIAGNOSIS — C61 PROSTATE CANCER: Primary | ICD-10-CM

## 2018-07-19 DIAGNOSIS — C61 PROSTATE CANCER: ICD-10-CM

## 2018-07-19 LAB — COMPLEXED PSA SERPL-MCNC: 0.63 NG/ML

## 2018-07-19 PROCEDURE — 99999 PR PBB SHADOW E&M-EST. PATIENT-LVL III: CPT | Mod: PBBFAC,,, | Performed by: UROLOGY

## 2018-07-19 PROCEDURE — 99213 OFFICE O/P EST LOW 20 MIN: CPT | Mod: PBBFAC | Performed by: UROLOGY

## 2018-07-19 PROCEDURE — 36415 COLL VENOUS BLD VENIPUNCTURE: CPT

## 2018-07-19 PROCEDURE — 84153 ASSAY OF PSA TOTAL: CPT

## 2018-07-19 PROCEDURE — 99214 OFFICE O/P EST MOD 30 MIN: CPT | Mod: S$PBB,,, | Performed by: UROLOGY

## 2018-07-19 NOTE — PROGRESS NOTES
Clinic Note  7/19/2018      Subjective:         Chief Complaint:   HPI  Hu Lopez is a 82 y.o. male with a history of prostate cancer.  radical retropubic prostatectomy in 1980 by Dr. Mitchell. Rising PSA. Bone scan and CT scan in February 2016 did not show metastatic disease.  Patient complains of nocturia 4-5x/noc for last month. Started on Ditropan XL a year ago per symptoms improved.      Lab Results   Component Value Date    PSA 0.34 06/20/2017    PSA 0.02 01/19/2012    PSADIAG 0.63 07/19/2018    PSADIAG 0.59 07/13/2018    PSADIAG 0.40 01/08/2018    PSADIAG 0.26 09/21/2016    PSADIAG 0.22 01/26/2016      Past Medical History:   Diagnosis Date    Allergy     BPH (benign prostatic hyperplasia)     CAD (coronary artery disease)     Cataracts, bilateral     Diverticulitis     DVT (deep venous thrombosis)     Elevated PSA     GERD (gastroesophageal reflux disease)     Glaucoma     Hyperlipidemia     Hypertension     Prostate cancer     Skin cancer     Sleep apnea     CPAP     Family History   Problem Relation Age of Onset    Prostate cancer Father     Colon cancer Paternal Uncle     Stroke Paternal Uncle     Depression Paternal Uncle     No Known Problems Mother     Heart attack Brother 70     Social History     Social History    Marital status:      Spouse name: N/A    Number of children: N/A    Years of education: N/A     Occupational History    Not on file.     Social History Main Topics    Smoking status: Never Smoker    Smokeless tobacco: Never Used    Alcohol use 0.0 oz/week      Comment: rare    Drug use: No    Sexual activity: No     Other Topics Concern    Not on file     Social History Narrative    3 children - healthy.     Past Surgical History:   Procedure Laterality Date    APPENDECTOMY      COLON SURGERY      75,05    COLONOSCOPY      11    COLOSTOMY      PROSTATE SURGERY  2008    REVISION COLOSTOMY      SKIN CANCER EXCISION      chin    UPPER  "GASTROINTESTINAL ENDOSCOPY      11     Patient Active Problem List   Diagnosis    History of diverticulitis    GERD (gastroesophageal reflux disease)    CALVIN on CPAP    Nausea    Encysted hydrocele    History of prostate cancer    Nocturia    Essential hypertension    HLD (hyperlipidemia)    PARVEEN (generalized anxiety disorder)    Urgency of urination    Prostate cancer    Atherosclerosis of arteries of extremities    Environmental allergies    Aortic atherosclerosis    Ectatic aorta     Review of Systems   Constitutional: Negative for appetite change, chills, fatigue, fever and unexpected weight change.   HENT: Negative for nosebleeds.    Respiratory: Negative for shortness of breath and wheezing.    Cardiovascular: Negative for chest pain, palpitations and leg swelling.   Gastrointestinal: Negative for abdominal distention, abdominal pain, constipation, diarrhea, nausea and vomiting.   Genitourinary: Positive for frequency. Negative for dysuria, hematuria and urgency.   Musculoskeletal: Negative for arthralgias and back pain.   Skin: Negative for pallor.   Neurological: Negative for dizziness, seizures and syncope.   Hematological: Negative for adenopathy.   Psychiatric/Behavioral: Negative for dysphoric mood.         Objective:      BP (!) 189/89   Pulse (!) 58   Resp 16   Ht 5' 11" (1.803 m)   Wt 82.1 kg (181 lb)   BMI 25.24 kg/m²   Estimated body mass index is 25.24 kg/m² as calculated from the following:    Height as of this encounter: 5' 11" (1.803 m).    Weight as of this encounter: 82.1 kg (181 lb).  Physical Exam   Constitutional: He is oriented to person, place, and time. He appears well-developed and well-nourished. No distress.   HENT:   Head: Atraumatic.   Neck: No tracheal deviation present.   Cardiovascular: Normal rate.    Pulmonary/Chest: Effort normal. No respiratory distress. He has no wheezes.   Abdominal: Soft. Bowel sounds are normal. He exhibits no distension and no mass. " There is no tenderness. There is no rebound and no guarding.   Neurological: He is alert and oriented to person, place, and time.   Skin: Skin is warm and dry. He is not diaphoretic.     Psychiatric: He has a normal mood and affect. His behavior is normal. Judgment and thought content normal.         Assessment and Plan:           Problem List Items Addressed This Visit     Prostate cancer - Primary          Follow up:     Discussed PSA, discussed EBRT, surveillance.  6 months with PSA.  Royal Canas

## 2018-08-27 ENCOUNTER — TELEPHONE (OUTPATIENT)
Dept: DERMATOLOGY | Facility: CLINIC | Age: 83
End: 2018-08-27

## 2018-09-04 ENCOUNTER — TELEPHONE (OUTPATIENT)
Dept: UROLOGY | Facility: CLINIC | Age: 83
End: 2018-09-04

## 2018-09-04 NOTE — TELEPHONE ENCOUNTER
----- Message from Ramona Sanders LPN sent at 9/4/2018 11:20 AM CDT -----  Contact: PT      ----- Message -----  From: Luz Rod  Sent: 9/3/2018   8:52 AM  To: Stephan CAMACHO Staff    PT is calling asking for a nurse to call him back on 9/4 to help him reschedule appointment   PT had cancelled due to weather      Callback: [phone]

## 2018-09-06 ENCOUNTER — OFFICE VISIT (OUTPATIENT)
Dept: UROLOGY | Facility: CLINIC | Age: 83
End: 2018-09-06
Payer: MEDICARE

## 2018-09-06 VITALS
BODY MASS INDEX: 26.88 KG/M2 | DIASTOLIC BLOOD PRESSURE: 76 MMHG | WEIGHT: 192 LBS | HEART RATE: 61 BPM | SYSTOLIC BLOOD PRESSURE: 143 MMHG | HEIGHT: 71 IN

## 2018-09-06 DIAGNOSIS — Z85.46 HISTORY OF PROSTATE CANCER: Primary | ICD-10-CM

## 2018-09-06 DIAGNOSIS — N32.81 OAB (OVERACTIVE BLADDER): ICD-10-CM

## 2018-09-06 DIAGNOSIS — R39.15 URGENCY OF URINATION: ICD-10-CM

## 2018-09-06 DIAGNOSIS — Z90.79 HX OF RADICAL PROSTATECTOMY: ICD-10-CM

## 2018-09-06 DIAGNOSIS — R39.12 WEAK URINE STREAM: ICD-10-CM

## 2018-09-06 PROCEDURE — 99999 PR PBB SHADOW E&M-EST. PATIENT-LVL IV: CPT | Mod: PBBFAC,,, | Performed by: UROLOGY

## 2018-09-06 PROCEDURE — 99214 OFFICE O/P EST MOD 30 MIN: CPT | Mod: PBBFAC | Performed by: UROLOGY

## 2018-09-06 PROCEDURE — 99215 OFFICE O/P EST HI 40 MIN: CPT | Mod: S$PBB,,, | Performed by: UROLOGY

## 2018-09-06 NOTE — PROGRESS NOTES
CC: urgency, nocturia    Hu Lopez is a 82 y.o. man who is here for the evaluation of Urinary Frequency (last visit 8/25/17-was advised to follow up in 3 months with voiding diary. on oxybutynin 10mg with some help, but thinks it weakend his stream )  I saw him for his urinary symptoms, referred by Dr. Canas.  a history of prostate cancer.  radical retropubic prostatectomy in 1980 by Dr. Mitchell. Rising PSA. Bone scan and CT scan in February did not show metastatic disease.  Patient complains of nocturia 4-5x/noc.  Did not respond to rapaflo..  Tried oxybutynin 5 ml xl, which is increased by me up to 10 mg xl.  He reports that he can urinate 2 to 6 oz per each void, daily urine output is over 30 oz.  Nocturia 2 to 3 x.  Day time frequency q 2 hours  He is still bothered by his urgency.  He feels that his overall urine flow got slowed since he has been on oxybutynin xl 10 mg.  Denies any dry mouth.    SUDS cysto by me on 3/9/17.  Findings:   --- Bladder ---   CYSTOMETROGRAM ( Filling Phase ):   Cystometric Numeric Data:   - First Desire (Sensation): 225 mL at 1cm of water.   - Normal Desire: 252mL at 5 cm of water.   - Strong Desire: 280 mL at 13 cm of water.   - Urgency (Imminent Void) : 331 mL at 21cm of water.   - Maximum Cystometric Capacity: 331 mL.   Compliance:   - normal.   Leak Point Pressure:   - Valsalva ( Abdominal ) Leak Point Pressure: none.   UROFLOW:   - Voided Volume: 360 mL.   - Residual Urine: 10 mL.   - Maximum Flow Rate: 30 mL/sec.   - Flow Pattern: normal  VOIDING PRESSURE STUDY ( Voiding Phase ):   Detrusor Pressure:   - Maximum Detrusor Pressure: 46cm of water.   - Detrusor Pressure at Maximum Flow: 24 cm of water.   - Detrusor Contraction Characteristics: Sustained contraction(s).  With abdominal straining  ELECTROMYOGRAM:   - normal.     ---Diagnostic Cystourethroscopy ---   Normal urethra.    Prostate absent, s/p RALP  Width of Bladder Neck Opening: Approximately 18 Fr.   Hyperemic  bladder mucosa, ? IC.   Normal ureteral orifices bilaterally.     CONCLUSIONS:   1. Sensory urgency  2. Possible IC  3. Nocturia  I recommended to increase oxybutynin xl to 10 mg at night and to avoid bladder irritants.  Denies flank pain, dysuira, hematuria .      Past Medical History:   Diagnosis Date    Allergy     BPH (benign prostatic hyperplasia)     CAD (coronary artery disease)     Cataracts, bilateral     Diverticulitis     DVT (deep venous thrombosis)     Elevated PSA     GERD (gastroesophageal reflux disease)     Glaucoma     Hyperlipidemia     Hypertension     Prostate cancer     Skin cancer     Sleep apnea     CPAP     Past Surgical History:   Procedure Laterality Date    APPENDECTOMY      COLON SURGERY      75,05    COLONOSCOPY      11    COLOSTOMY      PROSTATE SURGERY  2008    REVISION COLOSTOMY      SKIN CANCER EXCISION      chin    UPPER GASTROINTESTINAL ENDOSCOPY      11     Social History     Tobacco Use    Smoking status: Never Smoker    Smokeless tobacco: Never Used   Substance Use Topics    Alcohol use: Yes     Alcohol/week: 0.0 oz     Comment: rare    Drug use: No     Family History   Problem Relation Age of Onset    Prostate cancer Father     Colon cancer Paternal Uncle     Stroke Paternal Uncle     Depression Paternal Uncle     No Known Problems Mother     Heart attack Brother 70     Allergy:  Review of patient's allergies indicates:   Allergen Reactions    Codeine Nausea And Vomiting    Simcor [niacin-simvastatin] Rash    Simvastatin Rash     Outpatient Encounter Medications as of 9/6/2018   Medication Sig Dispense Refill    aspirin (ECOTRIN) 81 MG EC tablet Take 81 mg by mouth once daily.       atorvastatin (LIPITOR) 10 MG tablet Take 10 mg by mouth.      ciclopirox (PENLAC) 8 % Soln Apply topically nightly. 6.6 mL 11    desonide (DESOWEN) 0.05 % cream       lansoprazole (PREVACID) 15 MG capsule Take 15 mg by mouth.      melatonin 5 mg Tab Take by  mouth every evening.      metoprolol succinate (TOPROL-XL) 100 MG 24 hr tablet Take 1 tablet (100 mg total) by mouth once daily. 90 tablet 3    multivitamin (THERAGRAN) per tablet Take 1 tablet by mouth once daily.      omeprazole (PRILOSEC) 40 MG capsule       ondansetron (ZOFRAN) 4 MG tablet Take 1 tablet (4 mg total) by mouth every 8 (eight) hours as needed for Nausea. 30 tablet 3    ondansetron (ZOFRAN) 4 MG tablet Take 4 mg by mouth.      oxybutynin (DITROPAN-XL) 10 MG 24 hr tablet TAKE ONE TABLET BY MOUTH ONCE DAILY 30 tablet 11    polyethylene glycol (GLYCOLAX) 17 gram PwPk Take by mouth as needed.      PROCTOFOAM HC rectal foam USE ONE APPLICATIORFUL RECTALLY TWICE DAILY 10 g 3    psyllium (METAMUCIL) powder Take 1 packet by mouth once daily.      sertraline (ZOLOFT) 100 MG tablet Take 1 tablet (100 mg total) by mouth once daily. 90 tablet 3    sucralfate (CARAFATE) 100 mg/mL suspension Take 10 mLs (1 g total) by mouth 4 (four) times daily with meals and nightly. 414 mL 1    triamcinolone acetonide 0.025 % Lotn Apply 1 application topically 2 (two) times daily. 60 mL 0     No facility-administered encounter medications on file as of 9/6/2018.      Review of Systems   General ROS: GENERAL:  No weight gain or loss  SKIN:  No rashes or lacerations  HEAD:  No headaches  EYES:  No exophthalmos, jaundice or blindness  EARS:  No dizziness, tinnitus or hearing loss  NOSE:  No changes in smell  MOUTH & THROAT:  No dyskinetic movements or obvious goiter  CHEST:  No shortness of breath, hyperventilation or cough  CARDIOVASCULAR:  No tachycardia or chest pain  ABDOMEN:  No nausea, vomiting, pain, constipation or diarrhea  URINARY:  No frequency, dysuria or sexual dysfunction  ENDOCRINE:  No polydipsia, polyuria  MUSCULOSKELETAL:  No pain or stiffness of the joints  NEUROLOGIC:  No weakness, sensory changes, seizures, confusion, memory loss, tremor or other abnormal movements  Physical Exam     Vitals:     09/06/18 1324   BP: (!) 143/76   Pulse: 61     Physical Exam  Genitalia:  Scrotum: no rash or lesion  Normal symmetric epididymis without masses  Normal vas palpated  Normal size, symmetric testicles with no masses   Normal urethral meatus with no discharge  Normal circumcised penis with no lesion   Rectal:  Normal perineum and anus upon inspection.  Normal tone, no masses or tenderness;     LABS:  Lab Results   Component Value Date    PSA 0.34 06/20/2017    PSA 0.02 01/19/2012    PSADIAG 0.63 07/19/2018    PSADIAG 0.59 07/13/2018    PSADIAG 0.40 01/08/2018    PSADIAG 0.26 09/21/2016    PSADIAG 0.22 01/26/2016     Results for orders placed or performed in visit on 07/19/18   Prostate Specific Antigen, Diagnostic   Result Value Ref Range    PSA DIAGNOSTIC 0.63 0.00 - 4.00 ng/mL   Results for orders placed or performed in visit on 07/13/18   PROSTATE SPECIFIC ANTIGEN, DIAGNOSTIC   Result Value Ref Range    PSA DIAGNOSTIC 0.59 0.00 - 4.00 ng/mL   Results for orders placed or performed in visit on 01/08/18   Prostate Specific Antigen, Diagnostic   Result Value Ref Range    PSA DIAGNOSTIC 0.40 0.00 - 4.00 ng/mL     Lab Results   Component Value Date    CREATININE 1.4 07/13/2018    CREATININE 1.2 06/20/2017    CREATININE 1.3 11/14/2016     No results found for this or any previous visit.  Urine Culture, Routine   Date Value Ref Range Status   01/31/2012 NO GROWTH.  Final     PVR per bladder scan : 0 ml    UA clear, glucose 100 +    Assessment and Plan:  Hu was seen today for urinary frequency.    Diagnoses and all orders for this visit:    History of prostate cancer    OAB (overactive bladder)    Urgency of urination    Hx of radical prostatectomy    Weak urine stream    we discussed alternative therapy for his OAB symptoms.  botox injection vs. InterStim therapy discussed in detail.  I explained that InterStim Therapy for Urinary Control is indicated for the treatment of urinary retention and the symptoms of  overactive bladder, including urinary urge incontinence and significant symptoms of urgency-frequency alone or in combination, in patients who have failed or could not tolerate more conservative treatments.    Options of sacral nerve test stimulation by PNE (percutaneous lead) vs. Stage I InterStim Therapy ( implanted electrode) explained. Once the test stimulation results in a successful response, either a complete InterStim Therapy ( both stage I and II) or Stage II InterStim Therapy ( implantation of InterStim neuro-generator) will be scheduled.    Will schedule him for sacral nerve test stimulation to see whether or not it will improve this patient's refractory urinary symptoms.    Nature and risks of the procedure including, but not limited to pain, bleeding, infection, failure to improve urinary symptoms, lead migration, electrical shock, nerve injury, or mechanical failure explained. All questions were answered.     He can continue oxybutyin xl 10 mg daily.    I spent 40 minutes with the patient of which more than half was spent in direct consultation with the patient in regards to our treatment and plan.    Follow-up:  Follow-up in about 1 year (around 9/6/2019).

## 2018-09-06 NOTE — PATIENT INSTRUCTIONS
I explained that InterStim Therapy for Urinary Control is indicated for the treatment of urinary retention and the symptoms of overactive bladder, including urinary urge incontinence and significant symptoms of urgency-frequency alone or in combination, in patients who have failed or could not tolerate more conservative treatments.    Options of sacral nerve test stimulation by PNE (percutaneous lead) vs. Stage I InterStim Therapy ( implanted electrode) explained. Once the test stimulation results in a successful response, either a complete InterStim Therapy ( both stage I and II) or Stage II InterStim Therapy ( implantation of InterStim neuro-generator) will be scheduled.    Will schedule him for sacral nerve test stimulation to see whether or not it will improve this patient's refractory urinary symptoms.    Nature and risks of the procedure including, but not limited to pain, bleeding, infection, failure to improve urinary symptoms, lead migration, electrical shock, nerve injury, or mechanical failure explained. All questions were answered.

## 2018-09-07 ENCOUNTER — TELEPHONE (OUTPATIENT)
Dept: DERMATOLOGY | Facility: CLINIC | Age: 83
End: 2018-09-07

## 2018-09-07 NOTE — TELEPHONE ENCOUNTER
Spoke with Mr. Lopez. Pt would like his wife to be seen soon for a rash on her hands that is itchy. Was able to schedule pt to seen Dr. Felix on this Tuesday September 11th at 8:45am. ----- Message from Nallely Pritchard sent at 9/7/2018  8:30 AM CDT -----  Contact: patient   Needs Advice    Reason for call: Patient would like to speak with nurse about rescheduling appt on 09/21. Patient cancelled appt and wanted to have his wife take his place. Informed patient that slot is not opened for scheduling. Please contact patient to reschedule appt.      Communication Preference: [phone]

## 2018-09-11 ENCOUNTER — LAB VISIT (OUTPATIENT)
Dept: LAB | Facility: HOSPITAL | Age: 83
End: 2018-09-11
Attending: INTERNAL MEDICINE
Payer: MEDICARE

## 2018-09-11 DIAGNOSIS — Z12.11 ENCOUNTER FOR FIT (FECAL IMMUNOCHEMICAL TEST) SCREENING: ICD-10-CM

## 2018-09-11 PROCEDURE — 82274 ASSAY TEST FOR BLOOD FECAL: CPT

## 2018-09-12 LAB — HEMOCCULT STL QL IA: NEGATIVE

## 2018-11-27 ENCOUNTER — PES CALL (OUTPATIENT)
Dept: ADMINISTRATIVE | Facility: CLINIC | Age: 83
End: 2018-11-27

## 2019-01-22 ENCOUNTER — LAB VISIT (OUTPATIENT)
Dept: LAB | Facility: HOSPITAL | Age: 84
End: 2019-01-22
Attending: UROLOGY
Payer: MEDICARE

## 2019-01-22 ENCOUNTER — OFFICE VISIT (OUTPATIENT)
Dept: UROLOGY | Facility: CLINIC | Age: 84
End: 2019-01-22
Payer: MEDICARE

## 2019-01-22 VITALS
HEART RATE: 76 BPM | BODY MASS INDEX: 26.88 KG/M2 | RESPIRATION RATE: 15 BRPM | HEIGHT: 71 IN | DIASTOLIC BLOOD PRESSURE: 88 MMHG | WEIGHT: 192 LBS | SYSTOLIC BLOOD PRESSURE: 138 MMHG

## 2019-01-22 DIAGNOSIS — C61 PROSTATE CANCER: ICD-10-CM

## 2019-01-22 DIAGNOSIS — C61 PROSTATE CANCER: Primary | ICD-10-CM

## 2019-01-22 LAB
COMPLEXED PSA SERPL-MCNC: 0.55 NG/ML
POC RESIDUAL URINE VOLUME: 45 ML (ref 0–100)

## 2019-01-22 PROCEDURE — 99999 PR PBB SHADOW E&M-EST. PATIENT-LVL III: CPT | Mod: PBBFAC,,, | Performed by: UROLOGY

## 2019-01-22 PROCEDURE — 99214 OFFICE O/P EST MOD 30 MIN: CPT | Mod: S$PBB,,, | Performed by: UROLOGY

## 2019-01-22 PROCEDURE — 51798 US URINE CAPACITY MEASURE: CPT | Mod: PBBFAC | Performed by: UROLOGY

## 2019-01-22 PROCEDURE — 99999 PR PBB SHADOW E&M-EST. PATIENT-LVL III: ICD-10-PCS | Mod: PBBFAC,,, | Performed by: UROLOGY

## 2019-01-22 PROCEDURE — 99214 PR OFFICE/OUTPT VISIT, EST, LEVL IV, 30-39 MIN: ICD-10-PCS | Mod: S$PBB,,, | Performed by: UROLOGY

## 2019-01-22 PROCEDURE — 36415 COLL VENOUS BLD VENIPUNCTURE: CPT

## 2019-01-22 PROCEDURE — 84153 ASSAY OF PSA TOTAL: CPT

## 2019-01-22 PROCEDURE — 99213 OFFICE O/P EST LOW 20 MIN: CPT | Mod: PBBFAC | Performed by: UROLOGY

## 2019-01-22 NOTE — PROGRESS NOTES
Clinic Note  1/22/2019      Subjective:         Chief Complaint:   BHARGAV Lopez is a 83 y.o. male with a history of prostate cancer.  radical retropubic prostatectomy in 1980 by Dr. Mitchell. Rising PSA. Bone scan and CT scan in February 2016 did not show metastatic disease.  Patient complains of nocturia 4-5x/noc for last month. Started on Ditropan XL a year ago per symptoms improved.  Recent PSA 0.77 9/24/2018. Recent  Bilateral inguinal hernia surgery.    post void residual 46 ccs.      Lab Results   Component Value Date    PSA 0.34 06/20/2017    PSA 0.02 01/19/2012    PSADIAG 0.63 07/19/2018    PSADIAG 0.59 07/13/2018    PSADIAG 0.40 01/08/2018    PSADIAG 0.26 09/21/2016    PSADIAG 0.22 01/26/2016      Past Medical History:   Diagnosis Date    Allergy     BPH (benign prostatic hyperplasia)     CAD (coronary artery disease)     Cataracts, bilateral     Diverticulitis     DVT (deep venous thrombosis)     Elevated PSA     GERD (gastroesophageal reflux disease)     Glaucoma     Hyperlipidemia     Hypertension     Prostate cancer     Skin cancer     Sleep apnea     CPAP     Family History   Problem Relation Age of Onset    Prostate cancer Father     Colon cancer Paternal Uncle     Stroke Paternal Uncle     Depression Paternal Uncle     No Known Problems Mother     Heart attack Brother 70     Social History     Socioeconomic History    Marital status:      Spouse name: Not on file    Number of children: Not on file    Years of education: Not on file    Highest education level: Not on file   Social Needs    Financial resource strain: Not on file    Food insecurity - worry: Not on file    Food insecurity - inability: Not on file    Transportation needs - medical: Not on file    Transportation needs - non-medical: Not on file   Occupational History    Not on file   Tobacco Use    Smoking status: Never Smoker    Smokeless tobacco: Never Used   Substance and Sexual Activity     "Alcohol use: Yes     Alcohol/week: 0.0 oz     Comment: rare    Drug use: No    Sexual activity: No   Other Topics Concern    Not on file   Social History Narrative    3 children - healthy.     Past Surgical History:   Procedure Laterality Date    APPENDECTOMY      COLON SURGERY      75,05    COLONOSCOPY      11    COLOSTOMY      ESOPHAGOGASTRODUODENOSCOPY (EGD) N/A 12/3/2015    Performed by [facility] at [facility] ([facility] F[facility])    PROSTATE SURGERY  2008    REVISION COLOSTOMY      SKIN CANCER EXCISION      chin    UPPER GASTROINTESTINAL ENDOSCOPY      11     Patient Active Problem List   Diagnosis    History of diverticulitis    GERD (gastroesophageal reflux disease)    CALVIN on CPAP    Nausea    Encysted hydrocele    History of prostate cancer    Nocturia    Essential hypertension    HLD (hyperlipidemia)    PARVEEN (generalized anxiety disorder)    Urgency of urination    Prostate cancer    Atherosclerosis of arteries of extremities    Environmental allergies    Aortic atherosclerosis    Ectatic aorta    OAB (overactive bladder)    Hx of radical prostatectomy    Weak urine stream     Review of Systems   Constitutional: Negative for appetite change, chills, fatigue, fever and unexpected weight change.   HENT: Negative for nosebleeds.    Respiratory: Negative for shortness of breath and wheezing.    Cardiovascular: Negative for chest pain, palpitations and leg swelling.   Gastrointestinal: Negative for abdominal distention, abdominal pain, constipation, diarrhea, nausea and vomiting.   Genitourinary: Positive for nocturia. Negative for hematuria.        Decreased FOS   Musculoskeletal: Negative for arthralgias and back pain.   Skin: Negative for pallor.   Neurological: Negative for dizziness, seizures and syncope.   Hematological: Negative for adenopathy.   Psychiatric/Behavioral: Negative for dysphoric mood.         Objective:      /88   Pulse 76   Resp 15   Ht 5' 11" (1.803 m)   " "Wt 87.1 kg (192 lb)   BMI 26.78 kg/m²   Estimated body mass index is 26.78 kg/m² as calculated from the following:    Height as of this encounter: 5' 11" (1.803 m).    Weight as of this encounter: 87.1 kg (192 lb).  Physical Exam   Constitutional: He is oriented to person, place, and time. He appears well-developed and well-nourished. No distress.   HENT:   Head: Atraumatic.   Neck: No tracheal deviation present.   Cardiovascular: Normal rate.    Pulmonary/Chest: Effort normal. No respiratory distress. He has no wheezes.   Abdominal: Soft. Bowel sounds are normal. He exhibits no distension and no mass. There is no tenderness. There is no rebound and no guarding.   Neurological: He is alert and oriented to person, place, and time.   Skin: Skin is warm and dry. He is not diaphoretic.     Psychiatric: He has a normal mood and affect. His behavior is normal. Judgment and thought content normal.         Assessment and Plan:           Problem List Items Addressed This Visit     Prostate cancer - Primary          Follow up:   1 year with PSA.    Royal Canas        "

## 2019-01-25 DIAGNOSIS — K30 NUD (NONULCER DYSPEPSIA): ICD-10-CM

## 2019-01-25 RX ORDER — ONDANSETRON 4 MG/1
TABLET, FILM COATED ORAL
Qty: 30 TABLET | Refills: 3 | Status: SHIPPED | OUTPATIENT
Start: 2019-01-25 | End: 2020-05-14

## 2019-05-07 DIAGNOSIS — R35.1 NOCTURIA: ICD-10-CM

## 2019-05-07 RX ORDER — OXYBUTYNIN CHLORIDE 10 MG/1
TABLET, EXTENDED RELEASE ORAL
Qty: 30 TABLET | Refills: 11 | Status: SHIPPED | OUTPATIENT
Start: 2019-05-07 | End: 2019-09-04 | Stop reason: SDUPTHER

## 2019-09-04 ENCOUNTER — TELEPHONE (OUTPATIENT)
Dept: INTERNAL MEDICINE | Facility: CLINIC | Age: 84
End: 2019-09-04

## 2019-09-04 DIAGNOSIS — C44.310 BASAL CELL CARCINOMA (BCC) OF SKIN OF FACE, UNSPECIFIED PART OF FACE: Primary | ICD-10-CM

## 2019-09-04 DIAGNOSIS — R35.1 NOCTURIA: ICD-10-CM

## 2019-09-04 RX ORDER — OXYBUTYNIN CHLORIDE 10 MG/1
TABLET, EXTENDED RELEASE ORAL
Qty: 90 TABLET | Refills: 0 | Status: SHIPPED | OUTPATIENT
Start: 2019-09-04 | End: 2022-08-31

## 2019-09-04 NOTE — TELEPHONE ENCOUNTER
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 Talbot recommends---pls advise

## 2019-09-04 NOTE — TELEPHONE ENCOUNTER
----- Message from Kristin Esparza sent at 9/4/2019  9:45 AM CDT -----  Type: Patient Call Back    Who called: pt     What is the request in detail: pt asking for a call back to request to schedule surgery on his forehead.     Can the clinic reply by [facility]? No     Would the patient rather a call back or a response via [facility]? Call back     Best call back number: [phone]    Additional Information:

## 2019-09-05 NOTE — TELEPHONE ENCOUNTER
For Dermatology, I usually recommend Dr. Rod on the [place] or Dr. Shivani Doherty in [place] however I believe all of our providers are good so I don't think he can go wrong.  Dermatology referral is in.

## 2019-09-09 ENCOUNTER — TELEPHONE (OUTPATIENT)
Dept: DERMATOLOGY | Facility: CLINIC | Age: 84
End: 2019-09-09

## 2019-09-09 NOTE — TELEPHONE ENCOUNTER
Contacted Mr. Lopez he would like wo come in for a consult visit. He is a new pt who is referred over to Mohs from Dr. Cuevas will mail appt karen and Adia.

## 2019-09-09 NOTE — TELEPHONE ENCOUNTER
----- Message from Tyrone Russ sent at 9/9/2019  8:52 AM CDT -----  Contact: pt @ [phone]  Pt is calling to schedule an appt w/ the doctor to have carcinoma on forehead removed. Biopsy results/referral are being faxed from Dr. Cuevas's office to fax# [phone].

## 2019-09-11 ENCOUNTER — PATIENT OUTREACH (OUTPATIENT)
Dept: ADMINISTRATIVE | Facility: HOSPITAL | Age: 84
End: 2019-09-11

## 2019-09-11 NOTE — PROGRESS NOTES
Spoke with wife, states a lot going on with  now. She will call to schedule both of their appointments due.

## 2019-09-18 ENCOUNTER — PATIENT OUTREACH (OUTPATIENT)
Dept: ADMINISTRATIVE | Facility: OTHER | Age: 84
End: 2019-09-18

## 2019-09-19 ENCOUNTER — PATIENT OUTREACH (OUTPATIENT)
Dept: ADMINISTRATIVE | Facility: OTHER | Age: 84
End: 2019-09-19

## 2019-09-23 ENCOUNTER — INITIAL CONSULT (OUTPATIENT)
Dept: DERMATOLOGY | Facility: CLINIC | Age: 84
End: 2019-09-23
Payer: MEDICARE

## 2019-09-23 VITALS — BODY MASS INDEX: 26.88 KG/M2 | HEIGHT: 71 IN | WEIGHT: 192 LBS

## 2019-09-23 DIAGNOSIS — C44.319 BASAL CELL CARCINOMA OF RIGHT FOREHEAD: Primary | ICD-10-CM

## 2019-09-23 PROCEDURE — 99202 PR OFFICE/OUTPT VISIT, NEW, LEVL II, 15-29 MIN: ICD-10-PCS | Mod: S$PBB,,, | Performed by: DERMATOLOGY

## 2019-09-23 PROCEDURE — 99213 OFFICE O/P EST LOW 20 MIN: CPT | Mod: PBBFAC | Performed by: DERMATOLOGY

## 2019-09-23 PROCEDURE — 99202 OFFICE O/P NEW SF 15 MIN: CPT | Mod: S$PBB,,, | Performed by: DERMATOLOGY

## 2019-09-23 PROCEDURE — 99999 PR PBB SHADOW E&M-EST. PATIENT-LVL III: CPT | Mod: PBBFAC,,, | Performed by: DERMATOLOGY

## 2019-09-23 PROCEDURE — 99999 PR PBB SHADOW E&M-EST. PATIENT-LVL III: ICD-10-PCS | Mod: PBBFAC,,, | Performed by: DERMATOLOGY

## 2019-09-23 NOTE — PROGRESS NOTES
REFERRING MD:  Sydney Cuevas MD    CHIEF COMPLAINT:  New patient being consulted for Mohs' surgery evaluation.    HISTORY OF PRESENT ILLNESS:  83 y.o. male presents with an unknown duration of growth on the R forehead. Patient is unable to identify the area that was biopsied. Went to see Dr. Cuevas for scaly areas, which were treated with cryo.     Negative for scabbing.  Negative for crusting.  Negative for bleeding.  Negative for itching.    Biopsy consistent with basal cell carcinoma.     No prior treatment.    Pacemaker: No  Defibrillator: No  Artificial joints: No  Artificial heart valves: No    PAST MEDICAL HISTORY:  Past Medical History:   Diagnosis Date    Allergy     Basal cell carcinoma     BPH (benign prostatic hyperplasia)     CAD (coronary artery disease)     Cataracts, bilateral     Diverticulitis     DVT (deep venous thrombosis)     Elevated PSA     GERD (gastroesophageal reflux disease)     Glaucoma     Hyperlipidemia     Hypertension     Prostate cancer     Skin cancer     Sleep apnea     CPAP       PAST SURGICAL HISTORY:  Past Surgical History:   Procedure Laterality Date    APPENDECTOMY      COLON SURGERY      75,05    COLONOSCOPY      11    COLOSTOMY      ESOPHAGOGASTRODUODENOSCOPY (EGD) N/A 12/3/2015    Performed by [facility]scar [facility] at [facility] ([address] [place] [address])    PROSTATE SURGERY  2008    REVISION COLOSTOMY      SKIN CANCER EXCISION      chin    UPPER GASTROINTESTINAL ENDOSCOPY      11        SOCIAL HISTORY:  Dependencies:  never smoked    PERTINENT MEDICATIONS:  See medications list.  aspirin    ALLERGIES:  Codeine; Simcor [niacin-simvastatin]; and Simvastatin    ROS:  Skin: See HPI  Constitutional: No fatigue, fever, malaise, weight loss, or night sweats.  Cardiovascular: No chest pain, palpitations, or edema.  Respiratory: No coughing, wheezing, SOB, or sputum production.    Physical Exam   HENT:   Head:             General: Mood and affect normal. Alert and orient X3.  Normal appearance.  Eyelids:  no suspicious lesions  Head/Face: R superolateral forehead with a 3 x 4 mm pink bx site located 6.5 cm anteriorly from the right superior ear attachment and 7 cm superiorly.   Lips/Teeth/Gums:  no suspicious lesions     IMPRESSION:  Biopsy proven superficial basal cell carcinoma, R forehead, path# [id number].    PLAN:  The diagnosis and the pathology report were discussed in detail with the patient. Treatment options were reviewed, including Mohs Micrographic Surgery, radiation, topical therapy, and standard excision.  After careful review of patient's history and physical exam, and after discussion of treatment options, the decision was made to perform Mohs micrographic surgery.    Scheduled patient for Mohs Micrographic Surgery. Risks, benefits, and alternatives of Mohs' surgery discussed with the patient. Discussed repair options including complex closure, skin flap, skin graft and second intention healing with the patient. Pre-operative instructions provided to the patient. Since patient was unable to verify biopsied site and no photograph or diagram was available, will have patient go back to Dr. Cuevas's office to verify that the location I circled today is indeed the biopsy site.  If not, we have asked Dr Cuevas's office to take a picture and send it to us.    Consulting report is sent to the consulting provider.

## 2019-10-11 ENCOUNTER — PATIENT OUTREACH (OUTPATIENT)
Dept: ADMINISTRATIVE | Facility: OTHER | Age: 84
End: 2019-10-11

## 2019-10-14 ENCOUNTER — PROCEDURE VISIT (OUTPATIENT)
Dept: DERMATOLOGY | Facility: CLINIC | Age: 84
End: 2019-10-14
Payer: MEDICARE

## 2019-10-14 ENCOUNTER — TELEPHONE (OUTPATIENT)
Dept: DERMATOLOGY | Facility: CLINIC | Age: 84
End: 2019-10-14

## 2019-10-14 VITALS — BODY MASS INDEX: 26.88 KG/M2 | WEIGHT: 192 LBS | HEIGHT: 71 IN

## 2019-10-14 DIAGNOSIS — C44.319 BASAL CELL CARCINOMA OF RIGHT FOREHEAD: Primary | ICD-10-CM

## 2019-10-14 PROCEDURE — 99499 UNLISTED E&M SERVICE: CPT | Mod: S$PBB,,, | Performed by: DERMATOLOGY

## 2019-10-14 PROCEDURE — 17311 MOHS 1 STAGE H/N/HF/G: CPT | Mod: PBBFAC | Performed by: DERMATOLOGY

## 2019-10-14 PROCEDURE — 17311: ICD-10-PCS | Mod: S$PBB,,, | Performed by: DERMATOLOGY

## 2019-10-14 PROCEDURE — 13132 PR RECMPL WND HEAD,FAC,HAND 2.6-7.5 CM: ICD-10-PCS | Mod: S$PBB,59,, | Performed by: DERMATOLOGY

## 2019-10-14 PROCEDURE — 13132 CMPLX RPR F/C/C/M/N/AX/G/H/F: CPT | Mod: PBBFAC | Performed by: DERMATOLOGY

## 2019-10-14 PROCEDURE — 17311 MOHS 1 STAGE H/N/HF/G: CPT | Mod: S$PBB,,, | Performed by: DERMATOLOGY

## 2019-10-14 PROCEDURE — 13132 CMPLX RPR F/C/C/M/N/AX/G/H/F: CPT | Mod: S$PBB,59,, | Performed by: DERMATOLOGY

## 2019-10-14 PROCEDURE — 99499 NO LOS: ICD-10-PCS | Mod: S$PBB,,, | Performed by: DERMATOLOGY

## 2019-10-14 RX ORDER — HYDROCODONE BITARTRATE AND ACETAMINOPHEN 5; 325 MG/1; MG/1
1 TABLET ORAL EVERY 6 HOURS PRN
Qty: 10 TABLET | Refills: 0 | Status: SHIPPED | OUTPATIENT
Start: 2019-10-14 | End: 2022-08-31

## 2019-10-14 RX ORDER — PROMETHAZINE HYDROCHLORIDE 25 MG/1
25 TABLET ORAL EVERY 4 HOURS
Qty: 10 TABLET | Refills: 0 | Status: SHIPPED | OUTPATIENT
Start: 2019-10-14 | End: 2022-08-31

## 2019-10-14 NOTE — PROGRESS NOTES
PROCEDURE: Mohs' Micrographic Surgery    INDICATION: Location in non-mask areas of face where maximum conservation of tumor-free tissue is needed. Biopsy-proven skin cancer of cosmetically and functionally important areas, including head, neck, genital, hand, foot, or areas known for having difficulty in healing, such as the lower anterior legs. Tumor with ill-defined borders.    REFERRING MD: Sydney Cuevas MD    CASE NUMBER:     ANESTHETIC: 3 cc 0.5% Lidocaine with Epi 1:200,000 mixed 1:1 with 0.5% Bupivacaine    SURGICAL PREP: Hibiclens    SURGEON: Gerard Noble MD    ASSISTANTS: Leatha Mosqueda PA-C and Patrick Lebron, Surg Tech    PREOPERATIVE DIAGNOSIS: basal cell carcinoma    POSTOPERATIVE DIAGNOSIS: basal cell carcinoma    PATHOLOGIC DIAGNOSIS: basal cell carcinoma- superficial    HISTOLOGY OF SPECIMENS IN FIRST STAGE:   Tumor Type: No tumor seen.    STAGES OF MOHS' SURGERY PERFORMED: 1    TUMOR-FREE PLANE ACHIEVED: Yes    HEMOSTASIS: electrocoagulation     SPECIMENS: 2    LOCATION: right (superolateral) forehead. Location verified with photos from Dr. Cuevas's office that patient brought in himself. Patient also verified location with hand held mirror. Also verified with photos from patient's phone.     INITIAL LESION SIZE: 0.4 x 0.4 cm    FINAL DEFECT SIZE: 0.8 x 1.0 cm    WOUND REPAIR/DISPOSITION: The patient tolerated Mohs' Micrographic Surgery for a basal cell carcinoma very well. When the tumor was completely removed, a repair of the surgical defect was undertaken.      PROCEDURE: Complex Linear Repair    INDICATION: Status post Mohs' Micrographic Surgery for basal cell carcinoma.    CASE NUMBER:     SURGEON: Gerard Noble MD    ASSISTANTS: Leatha Mosqueda PA-C, Rosario Reis, Surg Tech and Patrick Lebron, Surg Tech    ANESTHETIC: 2 cc 1% Lidocaine with Epinephrine 1:100,000    SURGICAL PREP: Hibiclens, prepped by Patrick Bernardino, Surg Tech    LOCATION: right (superolataeral) forehead    DEFECT SIZE:  "0.8 x 1.0 cm    WOUND REPAIR/DISPOSITION:  After the patient's carcinoma had been completely removed with Mohs' Micrographic Surgery, a repair of the surgical defect was undertaken. The patient was returned to the operating suite where the area of right superolateral forehead was prepped, draped, and anesthetized in the usual sterile fashion. The wound was widely undermined in all directions. Then, electrocoagulation was used to obtain meticulous hemostasis. 5-0 Vicryl buried vertical mattress sutures were placed into the subcutaneous and dermal plane to close the wound and omar the cutaneous wound edge. Bilateral dog ears were identified and were removed by a standard Burow's triangle technique. The cutaneous wound edges were closed using interrupted 5-0 Prolene suture.    The patient tolerated the procedure well.    The area was cleaned and dressed appropriately and the patient was given wound care instructions, as well as appointment for follow-up evaluation. Patient was placed on Norco 5-325 mg prn postop pain and Phenergan 25 mg prn nausea.    LENGTH OF REPAIR: 2.7 cm    Vitals:    10/14/19 1110   Weight: 87.1 kg (192 lb)   Height: 5' 11" (1.803 m)         "

## 2019-10-14 NOTE — TELEPHONE ENCOUNTER
----- Message from Gerard Noble MD sent at 10/14/2019  8:23 AM CDT -----  Contact: pt   Pl call pt and let him know it's okay to take aspirin. Thanks.    ----- Message -----  From: Komal Stephanie  Sent: 10/11/2019   4:49 PM CDT  To: Gerard Noble MD, Real Gee S Staff    Real - pt pt is calling to speak with the pt is having surgery on Monday October 14 pt said he took a tylenol on Thursday morning pt is asking if he needs to stop his medication over the weekend before his surgery for Monday can you please call pt [phone] Pt said he took a low dose of Asprin ever day and pt took an aspirin this morning     ALIZE

## 2019-10-21 ENCOUNTER — OFFICE VISIT (OUTPATIENT)
Dept: DERMATOLOGY | Facility: CLINIC | Age: 84
End: 2019-10-21
Payer: MEDICARE

## 2019-10-21 DIAGNOSIS — Z09 POSTOP CHECK: Primary | ICD-10-CM

## 2019-10-21 PROCEDURE — 99999 PR PBB SHADOW E&M-EST. PATIENT-LVL III: ICD-10-PCS | Mod: PBBFAC,,, | Performed by: DERMATOLOGY

## 2019-10-21 PROCEDURE — 99999 PR PBB SHADOW E&M-EST. PATIENT-LVL III: CPT | Mod: PBBFAC,,, | Performed by: DERMATOLOGY

## 2019-10-21 PROCEDURE — 99024 PR POST-OP FOLLOW-UP VISIT: ICD-10-PCS | Mod: POP,,, | Performed by: DERMATOLOGY

## 2019-10-21 PROCEDURE — 99024 POSTOP FOLLOW-UP VISIT: CPT | Mod: POP,,, | Performed by: DERMATOLOGY

## 2019-10-21 PROCEDURE — 99213 OFFICE O/P EST LOW 20 MIN: CPT | Mod: PBBFAC | Performed by: DERMATOLOGY

## 2019-10-21 NOTE — PROGRESS NOTES
83 y.o. male patient is here for suture removal following Mohs' surgery.    Patient reports no problems.    WOUND PE:  The R forehead sutures intact. Wound healing well. Good skin edges. No signs or symptoms of infection.    IMPRESSION:  Healing operative site from Mohs' surgery, BCC R forehead s/p Mohs with CLC, postop day #7.    PLAN:  Sutures removed today. Steri-strips applied.  Continue wound care.  Keep moist with Aquaphor.    RTC:  In 3-6 months with Sydney Cuevas MD for skin check or sooner if new concern arises.  
normal (ped)...

## 2019-11-18 ENCOUNTER — TELEPHONE (OUTPATIENT)
Dept: INTERNAL MEDICINE | Facility: CLINIC | Age: 84
End: 2019-11-18

## 2019-11-18 DIAGNOSIS — F41.9 ANXIETY: ICD-10-CM

## 2019-11-18 NOTE — TELEPHONE ENCOUNTER
----- Message from Nita Owens sent at 11/18/2019  8:35 AM CST -----  Contact: Self [phone] or [phone]  Patient would like a refill for sertraline (ZOLOFT) 100 MG tablet sent to [address] [facility]. Please advise.

## 2019-11-19 RX ORDER — SERTRALINE HYDROCHLORIDE 100 MG/1
100 TABLET, FILM COATED ORAL DAILY
Qty: 90 TABLET | Refills: 0 | Status: SHIPPED | OUTPATIENT
Start: 2019-11-19 | End: 2020-02-27

## 2020-01-27 ENCOUNTER — LAB VISIT (OUTPATIENT)
Dept: LAB | Facility: HOSPITAL | Age: 85
End: 2020-01-27
Attending: UROLOGY
Payer: MEDICARE

## 2020-01-27 DIAGNOSIS — C61 PROSTATE CANCER: ICD-10-CM

## 2020-01-27 LAB — COMPLEXED PSA SERPL-MCNC: 1.1 NG/ML (ref 0–4)

## 2020-01-27 PROCEDURE — 36415 COLL VENOUS BLD VENIPUNCTURE: CPT

## 2020-01-27 PROCEDURE — 84153 ASSAY OF PSA TOTAL: CPT

## 2020-02-03 ENCOUNTER — OFFICE VISIT (OUTPATIENT)
Dept: INTERNAL MEDICINE | Facility: CLINIC | Age: 85
End: 2020-02-03
Payer: MEDICARE

## 2020-02-03 ENCOUNTER — HOSPITAL ENCOUNTER (OUTPATIENT)
Dept: RADIOLOGY | Facility: HOSPITAL | Age: 85
Discharge: HOME | End: 2020-02-03
Attending: INTERNAL MEDICINE
Payer: MEDICARE

## 2020-02-03 VITALS
OXYGEN SATURATION: 95 % | WEIGHT: 210.75 LBS | HEIGHT: 71 IN | HEART RATE: 70 BPM | SYSTOLIC BLOOD PRESSURE: 132 MMHG | BODY MASS INDEX: 29.5 KG/M2 | DIASTOLIC BLOOD PRESSURE: 76 MMHG

## 2020-02-03 DIAGNOSIS — Z85.46 HISTORY OF PROSTATE CANCER: ICD-10-CM

## 2020-02-03 DIAGNOSIS — K21.9 GASTROESOPHAGEAL REFLUX DISEASE, ESOPHAGITIS PRESENCE NOT SPECIFIED: ICD-10-CM

## 2020-02-03 DIAGNOSIS — G47.00 INSOMNIA, UNSPECIFIED TYPE: ICD-10-CM

## 2020-02-03 DIAGNOSIS — I70.0 AORTIC ATHEROSCLEROSIS: ICD-10-CM

## 2020-02-03 DIAGNOSIS — S89.92XA KNEE INJURY, LEFT, INITIAL ENCOUNTER: Primary | ICD-10-CM

## 2020-02-03 DIAGNOSIS — B35.1 ONYCHOMYCOSIS: ICD-10-CM

## 2020-02-03 DIAGNOSIS — E78.5 HYPERLIPIDEMIA, UNSPECIFIED HYPERLIPIDEMIA TYPE: ICD-10-CM

## 2020-02-03 DIAGNOSIS — D50.9 IRON DEFICIENCY ANEMIA, UNSPECIFIED IRON DEFICIENCY ANEMIA TYPE: ICD-10-CM

## 2020-02-03 DIAGNOSIS — F41.1 GAD (GENERALIZED ANXIETY DISORDER): ICD-10-CM

## 2020-02-03 DIAGNOSIS — I10 ESSENTIAL HYPERTENSION: ICD-10-CM

## 2020-02-03 DIAGNOSIS — S89.92XA KNEE INJURY, LEFT, INITIAL ENCOUNTER: ICD-10-CM

## 2020-02-03 DIAGNOSIS — Z87.19 HISTORY OF DIVERTICULITIS: ICD-10-CM

## 2020-02-03 DIAGNOSIS — G47.33 OSA ON CPAP: ICD-10-CM

## 2020-02-03 PROCEDURE — 99214 PR OFFICE/OUTPT VISIT, EST, LEVL IV, 30-39 MIN: ICD-10-PCS | Mod: S$PBB,,, | Performed by: INTERNAL MEDICINE

## 2020-02-03 PROCEDURE — 73562 X-RAY EXAM OF KNEE 3: CPT | Mod: 59,TC,RT

## 2020-02-03 PROCEDURE — 73562 XR KNEE ORTHO LEFT WITH FLEXION: ICD-10-PCS | Mod: 26,59,RT, | Performed by: RADIOLOGY

## 2020-02-03 PROCEDURE — 99999 PR PBB SHADOW E&M-EST. PATIENT-LVL IV: ICD-10-PCS | Mod: PBBFAC,,, | Performed by: INTERNAL MEDICINE

## 2020-02-03 PROCEDURE — 99999 PR PBB SHADOW E&M-EST. PATIENT-LVL IV: CPT | Mod: PBBFAC,,, | Performed by: INTERNAL MEDICINE

## 2020-02-03 PROCEDURE — 73564 XR KNEE ORTHO LEFT WITH FLEXION: ICD-10-PCS | Mod: 26,LT,, | Performed by: RADIOLOGY

## 2020-02-03 PROCEDURE — 73564 X-RAY EXAM KNEE 4 OR MORE: CPT | Mod: 26,LT,, | Performed by: RADIOLOGY

## 2020-02-03 PROCEDURE — 99214 OFFICE O/P EST MOD 30 MIN: CPT | Mod: S$PBB,,, | Performed by: INTERNAL MEDICINE

## 2020-02-03 PROCEDURE — 99214 OFFICE O/P EST MOD 30 MIN: CPT | Mod: PBBFAC,25 | Performed by: INTERNAL MEDICINE

## 2020-02-03 PROCEDURE — 73562 X-RAY EXAM OF KNEE 3: CPT | Mod: 26,59,RT, | Performed by: RADIOLOGY

## 2020-02-03 RX ORDER — CICLOPIROX 80 MG/ML
SOLUTION TOPICAL NIGHTLY
Qty: 6.6 ML | Refills: 11 | Status: SHIPPED | OUTPATIENT
Start: 2020-02-03 | End: 2022-08-31

## 2020-02-03 RX ORDER — IRON,CARBONYL/ASCORBIC ACID 100-250 MG
TABLET ORAL
Refills: 3 | COMMUNITY
Start: 2019-10-28 | End: 2022-09-05 | Stop reason: SDUPTHER

## 2020-02-03 NOTE — PROGRESS NOTES
Internal Medicine    Subjective:      Patient ID: Hu Lopez is a 84 y.o. male.    Chief Complaint: Follow-up    HPI:  Patient presents for follow up appointment.  The patient's last visit with me was on 7/13/2018.    [place] physicians:    PCP = Dr. Hurtado  Urologist = Dr. Presley  Surgeon = Dr. Negron  Cardiologist = Dr. Mina  ENT = Dr. River    *Had labs through PCP last week - scanned into [facility].    Left knee pain:  Had fall a few months ago and has had left knee pain since then.  Has x-ray ordered in [place] through Dr. Hurtado, however he is planning to move here in a few months after selling his house in [place].  He is currently wearing a Dockery Elastic Knee Support.    Iron definitely anemia:  Diagnosed last year by his PCP, Dr. Hurtado, in [place].  Started on iron replacement and last labs in [place] a few weeks ago show normal iron, however CBC was not rechecked.  Iron is making him constipated so he is taking Miralax every other day and this is helpful.  He reports dark (not black) tarry stool for the last 6-7 months.  He denies abdominal pain, nausea/vomiting, blood in his stool, diarrhea, or weight loss.      Insomnia:  Has trouble falling asleep at times.  Feels fatigued the next day.  Wife says he naps during the day a good bit.  He has sleep study ordered in [place] - CPAP is 12 years old.  Takes Melatonin 10mg qHS off and on - wife isn't sure if this makes him sleepy this next day.     Fatigue:  This has been present for years.  Per Dr. Hurtado's note, felt to be due to anemia.        Anxiety:  Taking Zoloft 100mg daily.  He is unsure if this is helpful.  Wife reports he is still anxious.     HTN:  Taking Metoprolol succinate 100mg daily (had bradycardia on higher dose)..     HLD:  Taking Lipitor 40mg daily.     H/o prostate cancer, nocturnal polyuria:  S/p radical retropubic prostatectomy in 2008.  Followed by Urology (Dr. Canas) - last seen 1/11/18.  Plan is for follow up in 6 months.   Also seen by Dr. Rothman on 8/25/17 - Oxybutynin XL increased to 10mg daily.  Also said patient could consider botox injection or sacral nerve stimulation.      GERD:  Taking Prilosec 40mg daily (per ENT as above) and Carafate PRN.  No abdominal complaints today.  Has seen GI in the past.  Colonoscopy has been recommended but he has not completed this.     H/o diverticulitis:  S/p multiple surgeries - 1977 and 2005.  In ICU in [place] around [place].  Had colostomy for ~1 year.  Then had reversal.  No recent disease.       Vision:  Blind in left eye.  Saw retina specialist recently, Dr. Jackson.       Left ankle/foot pain:  Fell from elevator platform about 20 years ago.  Has occasional pain x 1-2 years.  Family says he has been limping some recently.      CALVIN:  Has upcoming sleep study in [place].  Needs new CPAP (12 years old).    Onychomycosis:  Has seen Podiatry in the past.  Prescribed Penlac but says he never tried.  Has only tried over the counter options which were not helpful.    Basal cell carcinoma:  Has had recent Mohs here at [facility].      Review of Systems   Constitutional: Positive for fatigue. Negative for appetite change, chills, fever and unexpected weight change.   HENT: Negative for sore throat and trouble swallowing.    Eyes: Negative for visual disturbance.   Respiratory: Negative for cough, chest tightness, shortness of breath and wheezing.    Cardiovascular: Negative for chest pain, palpitations and leg swelling.   Gastrointestinal: Negative for abdominal pain, blood in stool, constipation, diarrhea, nausea and vomiting.   Genitourinary: Negative for difficulty urinating.   Musculoskeletal: Positive for arthralgias. Negative for myalgias.   Skin: Negative for rash and wound.   Neurological: Negative for dizziness, weakness, numbness and headaches.   Psychiatric/Behavioral: Positive for sleep disturbance. Negative for behavioral problems, confusion and dysphoric mood. The patient is  "nervous/anxious.        Past medical history, surgical history, and family medical history reviewed and updated as appropriate.    Medications and allergies reviewed.     Objective:     Vitals:    02/03/20 1018   BP: 132/76   BP Location: Right arm   Patient Position: Sitting   BP Method: Medium (Manual)   Pulse: 70   SpO2: 95%   Weight: 95.6 kg (210 lb 12.2 oz)   Height: 5' 11" (1.803 m)     Physical Exam   Constitutional: He is oriented to person, place, and time. He appears well-developed and well-nourished. No distress.   HENT:   Head: Normocephalic and atraumatic.   Eyes: Conjunctivae and EOM are normal. No scleral icterus.   Neck: No thyromegaly present.   Cardiovascular: Normal rate and regular rhythm. Exam reveals no gallop and no friction rub.   No murmur heard.  Pulmonary/Chest: Effort normal and breath sounds normal. No respiratory distress. He has no wheezes. He has no rales.   Abdominal: Soft. He exhibits no distension and no mass. There is no tenderness. There is no rebound and no guarding.   Musculoskeletal: He exhibits no edema or tenderness.   Lymphadenopathy:     He has no cervical adenopathy.   Neurological: He is alert and oriented to person, place, and time.   Skin: No rash noted. No erythema.   Psychiatric: He has a normal mood and affect. His behavior is normal.       RESULTS:   Lab Results   Component Value Date    WBC 5.91 07/13/2018    HGB 14.1 07/13/2018    HCT 43.0 07/13/2018    MCV 93 07/13/2018     07/13/2018     BMP  Lab Results   Component Value Date     07/13/2018    K 5.2 (H) 07/13/2018     07/13/2018    CO2 28 07/13/2018    BUN 20 07/13/2018    CREATININE 1.4 07/13/2018    CALCIUM 9.6 07/13/2018    ANIONGAP 5 (L) 07/13/2018    ESTGFRAFRICA 54 (A) 07/13/2018    EGFRNONAA 46 (A) 07/13/2018     Lab Results   Component Value Date    ALT 21 07/13/2018    AST 23 07/13/2018    ALKPHOS 61 07/13/2018    BILITOT 0.6 07/13/2018     Lab Results   Component Value Date    TSH " 0.966 07/13/2018     Lab Results   Component Value Date    HGBA1C 5.5 06/20/2017         Assessment:     1. Knee injury, left, initial encounter    2. Iron deficiency anemia, unspecified iron deficiency anemia type    3. Essential hypertension    4. Hyperlipidemia, unspecified hyperlipidemia type    5. Aortic atherosclerosis    6. Onychomycosis    7. PARVEEN (generalized anxiety disorder)    8. History of prostate cancer    9. History of diverticulitis    10. Gastroesophageal reflux disease, esophagitis presence not specified    11. CALVIN on CPAP    12. Insomnia, unspecified type        Plan:     *Continue medication/plan as discussed in HPI except for changes discussed below.    Hu was seen today for follow-up.    Diagnoses and all orders for this visit:    Knee injury, left, initial encounter  -     Ambulatory referral/consult to Orthopedics; Future; Expected date: 02/03/2020  -     X-Ray Knee Complete 4 or More Views Left; Future; Expected date: 02/03/2020    Iron deficiency anemia, unspecified iron deficiency anemia type   Patient declining colonoscopy right now and states he would prefer to start with FIT test.  Discussed with patient my concern for GI bleed based on history today.  Repeat CBC in the next few weeks.    -     Fecal Immunochemical Test (iFOBT); Future; Expected date: 02/03/2020    Essential hypertension    Hyperlipidemia, unspecified hyperlipidemia type    Aortic atherosclerosis    Onychomycosis  -     ciclopirox (PENLAC) 8 % Soln; Apply topically nightly.    PARVEEN (generalized anxiety disorder)    History of prostate cancer    History of diverticulitis    Gastroesophageal reflux disease, esophagitis presence not specified    CALVIN on CPAP    Insomnia, unspecified type    Health maintenance reviewed with patient.     Follow up in about 3 months (around 5/3/2020).    Komal Talbot MD  Internal Medicine  [facility]

## 2020-02-05 ENCOUNTER — TELEPHONE (OUTPATIENT)
Dept: INTERNAL MEDICINE | Facility: CLINIC | Age: 85
End: 2020-02-05

## 2020-02-05 DIAGNOSIS — D50.9 IRON DEFICIENCY ANEMIA, UNSPECIFIED IRON DEFICIENCY ANEMIA TYPE: Primary | ICD-10-CM

## 2020-02-05 PROBLEM — S89.92XA KNEE INJURY, LEFT, INITIAL ENCOUNTER: Status: ACTIVE | Noted: 2020-02-05

## 2020-02-05 PROBLEM — B35.1 ONYCHOMYCOSIS: Status: ACTIVE | Noted: 2020-02-05

## 2020-02-05 PROBLEM — G47.00 INSOMNIA: Status: ACTIVE | Noted: 2020-02-05

## 2020-02-05 NOTE — TELEPHONE ENCOUNTER
Please call patient and let him know that I would like to recheck his blood counts since Dr. Hurtado did not recheck these with recent labs.  Please schedule CBC on same day as upcoming Ortho appointment.

## 2020-02-13 ENCOUNTER — LAB VISIT (OUTPATIENT)
Dept: LAB | Facility: HOSPITAL | Age: 85
End: 2020-02-13
Attending: INTERNAL MEDICINE
Payer: MEDICARE

## 2020-02-13 DIAGNOSIS — D50.9 IRON DEFICIENCY ANEMIA, UNSPECIFIED IRON DEFICIENCY ANEMIA TYPE: ICD-10-CM

## 2020-02-13 PROCEDURE — 82274 ASSAY TEST FOR BLOOD FECAL: CPT

## 2020-02-17 ENCOUNTER — LAB VISIT (OUTPATIENT)
Dept: LAB | Facility: HOSPITAL | Age: 85
End: 2020-02-17
Attending: INTERNAL MEDICINE
Payer: MEDICARE

## 2020-02-17 ENCOUNTER — OFFICE VISIT (OUTPATIENT)
Dept: ORTHOPEDICS | Facility: CLINIC | Age: 85
End: 2020-02-17
Payer: MEDICARE

## 2020-02-17 VITALS — WEIGHT: 208.25 LBS | HEIGHT: 71 IN | BODY MASS INDEX: 29.15 KG/M2

## 2020-02-17 DIAGNOSIS — M17.12 PRIMARY OSTEOARTHRITIS OF LEFT KNEE: ICD-10-CM

## 2020-02-17 DIAGNOSIS — D50.9 IRON DEFICIENCY ANEMIA, UNSPECIFIED IRON DEFICIENCY ANEMIA TYPE: ICD-10-CM

## 2020-02-17 LAB
BASOPHILS # BLD AUTO: 0.06 K/UL (ref 0–0.2)
BASOPHILS NFR BLD: 0.8 % (ref 0–1.9)
DIFFERENTIAL METHOD: ABNORMAL
EOSINOPHIL # BLD AUTO: 0.4 K/UL (ref 0–0.5)
EOSINOPHIL NFR BLD: 5.4 % (ref 0–8)
ERYTHROCYTE [DISTWIDTH] IN BLOOD BY AUTOMATED COUNT: 12.5 % (ref 11.5–14.5)
HCT VFR BLD AUTO: 45.1 % (ref 40–54)
HGB BLD-MCNC: 15 G/DL (ref 14–18)
IMM GRANULOCYTES # BLD AUTO: 0.02 K/UL (ref 0–0.04)
IMM GRANULOCYTES NFR BLD AUTO: 0.3 % (ref 0–0.5)
LYMPHOCYTES # BLD AUTO: 1.8 K/UL (ref 1–4.8)
LYMPHOCYTES NFR BLD: 24.9 % (ref 18–48)
MCH RBC QN AUTO: 33.1 PG (ref 27–31)
MCHC RBC AUTO-ENTMCNC: 33.3 G/DL (ref 32–36)
MCV RBC AUTO: 100 FL (ref 82–98)
MONOCYTES # BLD AUTO: 0.7 K/UL (ref 0.3–1)
MONOCYTES NFR BLD: 9.9 % (ref 4–15)
NEUTROPHILS # BLD AUTO: 4.4 K/UL (ref 1.8–7.7)
NEUTROPHILS NFR BLD: 58.7 % (ref 38–73)
NRBC BLD-RTO: 0 /100 WBC
PLATELET # BLD AUTO: 222 K/UL (ref 150–350)
PMV BLD AUTO: 9.2 FL (ref 9.2–12.9)
RBC # BLD AUTO: 4.53 M/UL (ref 4.6–6.2)
WBC # BLD AUTO: 7.4 K/UL (ref 3.9–12.7)

## 2020-02-17 PROCEDURE — 99203 PR OFFICE/OUTPT VISIT, NEW, LEVL III, 30-44 MIN: ICD-10-PCS | Mod: S$PBB,,, | Performed by: PHYSICIAN ASSISTANT

## 2020-02-17 PROCEDURE — 36415 COLL VENOUS BLD VENIPUNCTURE: CPT

## 2020-02-17 PROCEDURE — 99215 OFFICE O/P EST HI 40 MIN: CPT | Mod: PBBFAC | Performed by: PHYSICIAN ASSISTANT

## 2020-02-17 PROCEDURE — 99999 PR PBB SHADOW E&M-EST. PATIENT-LVL V: ICD-10-PCS | Mod: PBBFAC,,, | Performed by: PHYSICIAN ASSISTANT

## 2020-02-17 PROCEDURE — 99999 PR PBB SHADOW E&M-EST. PATIENT-LVL V: CPT | Mod: PBBFAC,,, | Performed by: PHYSICIAN ASSISTANT

## 2020-02-17 PROCEDURE — 99203 OFFICE O/P NEW LOW 30 MIN: CPT | Mod: S$PBB,,, | Performed by: PHYSICIAN ASSISTANT

## 2020-02-17 PROCEDURE — 85025 COMPLETE CBC W/AUTO DIFF WBC: CPT

## 2020-02-17 NOTE — PROGRESS NOTES
SUBJECTIVE:     Chief Complaint   Patient presents with    Left Knee - Pain       History of Present Illness:  Hu Lopez is a 84 y.o. year old male here with a history of intermittent left knee pain which started a few months ago.  The pain began after he was pushing something heavy.  The pain resolved however it has returned intermittently.  The pain is located in the medial aspect of the knee.  The pain is described as achy, 6/10.  There is not radiation.  There is not catching or locking.  Aggravating factors include going up and down stairs and prolonged sitting or activity.  Associated symptoms include popping sensation.  There is not numbness or tingling of the lower extremity.  Previous treatments include ice, OTC NSAIDs and rest which have provided minimal relief.  There is not a history of previous injury or surgery to the knee.  The patient does not use an assistive device.    Review of patient's allergies indicates:   Allergen Reactions    Codeine Nausea And Vomiting    Simcor [niacin-simvastatin] Rash    Simvastatin Rash         Current Outpatient Medications   Medication Sig Dispense Refill    aspirin (ECOTRIN) 81 MG EC tablet Take 81 mg by mouth once daily.       atorvastatin (LIPITOR) 10 MG tablet Take 40 mg by mouth.       ciclopirox (PENLAC) 8 % Soln Apply topically nightly. 6.6 mL 11    iron-vitamin C 100-250 mg, ICAR-C, 100-250 mg Tab TAKE 1 TABLET BY MOUTH ONCE DAILY FOR 90 DAYS  3    lansoprazole (PREVACID) 15 MG capsule Take 15 mg by mouth.      melatonin 5 mg Tab Take 10 mg by mouth every evening.       metoprolol succinate (TOPROL-XL) 100 MG 24 hr tablet Take 1 tablet (100 mg total) by mouth once daily. 90 tablet 3    omeprazole (PRILOSEC) 40 MG capsule       ondansetron (ZOFRAN) 4 MG tablet TAKE ONE TABLET BY MOUTH EVERY 8 HOURS AS NEEDED FOR NAUSEA 30 tablet 3    oxybutynin (DITROPAN-XL) 10 MG 24 hr tablet TAKE 1 TABLET BY MOUTH ONCE DAILY 90 tablet 0    promethazine  (PHENERGAN) 25 MG tablet Take 1 tablet (25 mg total) by mouth every 4 (four) hours. 10 tablet 0    psyllium (METAMUCIL) powder Take 1 packet by mouth once daily.      sertraline (ZOLOFT) 100 MG tablet Take 1 tablet (100 mg total) by mouth once daily. 90 tablet 0    sucralfate (CARAFATE) 100 mg/mL suspension Take 10 mLs (1 g total) by mouth 4 (four) times daily with meals and nightly. 414 mL 1    desonide (DESOWEN) 0.05 % cream       HYDROcodone-acetaminophen (NORCO) 5-325 mg per tablet Take 1 tablet by mouth every 6 (six) hours as needed for Pain. (Patient not taking: Reported on 2/17/2020) 10 tablet 0    multivitamin (THERAGRAN) per tablet Take 1 tablet by mouth once daily.      polyethylene glycol (GLYCOLAX) 17 gram PwPk Take by mouth as needed.      PROCTOFOAM HC rectal foam USE ONE APPLICATIORFUL RECTALLY TWICE DAILY (Patient not taking: Reported on 2/3/2020) 10 g 3    triamcinolone acetonide 0.025 % Lotn Apply 1 application topically 2 (two) times daily. 60 mL 0     No current facility-administered medications for this visit.        Past Medical History:   Diagnosis Date    Allergy     Basal cell carcinoma     BPH (benign prostatic hyperplasia)     CAD (coronary artery disease)     Cataracts, bilateral     Diverticulitis     DVT (deep venous thrombosis)     Elevated PSA     GERD (gastroesophageal reflux disease)     Glaucoma     Hyperlipidemia     Hypertension     Prostate cancer     Skin cancer     Sleep apnea     CPAP       Past Surgical History:   Procedure Laterality Date    APPENDECTOMY      COLON SURGERY      75,05    COLONOSCOPY      11    COLOSTOMY      PROSTATE SURGERY  2008    REVISION COLOSTOMY      SKIN CANCER EXCISION      chin    UPPER GASTROINTESTINAL ENDOSCOPY      11       Vital Signs (Most Recent)  There were no vitals filed for this visit.        Review of Systems:  ROS:  Constitutional: no fever or chills  Eyes: no visual changes, blind left eye  ENT: no nasal  "congestion or sore throat  Respiratory: no cough or shortness of breath  Cardiovascular: no chest pain or palpitations  Gastrointestinal: no nausea or vomiting, tolerating diet  Genitourinary: no hematuria or dysuria  Integument/Breast: no rash or pruritis  Hematologic/Lymphatic: no easy bruising or lymphadenopathy  Musculoskeletal: no arthralgias or myalgias  Neurological: no seizures or tremors  Behavioral/Psych: no auditory or visual hallucinations  Endocrine: no heat or cold intolerance      OBJECTIVE:     PHYSICAL EXAM:  Height: 5' 11" (180.3 cm) Weight: 94.4 kg (208 lb 3.6 oz)   General: Well developed, well nourished, in no acute distress.  Neurological: Mood & affect are normal.  HEENT: NCAT, sclera nonicteric   Lungs: Respirations are equal and unlabored.   CV: 2+ bilateral upper and lower extremity pulses.   Skin: Intact throughout with no rashes, erythema, or lesions  Extremities: No LE edema, no erythema or warmth of the skin in either lower extremity.    left  Knee Exam:  Knee Range of Motion: 0-120   Effusion: none  Condition of skin: intact, no rash or erythema  Location of tenderness:Medial joint line   Strength:5 of 5 quadriceps strength and 5 of 5 hamstring strength  Stability: stable to testing  Varus /Valgus stress:normal    Hip Examination:  full painless range of motion, without tenderness    IMAGING:    X-rays of the left knee, personally reviewed by me, demonstrate mild-moderate degenerative changes.  No fracture or dislocation.    ASSESSMENT/PLAN:   84 y.o. year old male with left knee osteoarthritis    Plan: We discussed with the patient at length all the different treatment options available for the knee including anti-inflammatories, acetaminophen, rest, ice, knee strengthening exercise, occasional cortisone injections for temporary relief    - Recommend rest, ice activity modification.  Use brace as needed with activity  - Consider steroid injection left knee- Noted history of glaucoma.  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  He is following up with his ophthalmologist next week and if ok for steroids he may return for injection.   - Follow up if symptoms worsen or fail to improve

## 2020-02-17 NOTE — LETTER
February 17, 2020      Komal Talbot MD  [address] [facility] [address]           [facility]  [address], [address]  [facility] [address]  Phone: [phone]          Patient: Hu Lopez   MR Number: [id number]   YOB: 1935   Date of Visit: 2/17/2020       Dear Dr. Komal Talbot:    Thank you for referring Hu Lopez to me for evaluation. Attached you will find relevant portions of my assessment and plan of care.    If you have questions, please do not hesitate to call me. I look forward to following Hu Lopez along with you.    Sincerely,    Nhoemi Canas PA-C    Enclosure  CC:  No Recipients    If you would like to receive this communication electronically, please contact [email or web address] or [phone] to request more information on [employer] Link access.    For providers and/or their staff who would like to refer a patient to [facility], please contact us through our one-stop-shop provider referral line, [facility], at [phone].    If you feel you have received this communication in error or would no longer like to receive these types of communications, please e-mail [email or web address]

## 2020-02-19 LAB — HEMOCCULT STL QL IA: NEGATIVE

## 2020-02-21 ENCOUNTER — PATIENT OUTREACH (OUTPATIENT)
Dept: ADMINISTRATIVE | Facility: HOSPITAL | Age: 85
End: 2020-02-21

## 2020-02-27 DIAGNOSIS — F41.9 ANXIETY: ICD-10-CM

## 2020-02-27 RX ORDER — SERTRALINE HYDROCHLORIDE 100 MG/1
TABLET, FILM COATED ORAL
Qty: 90 TABLET | Refills: 3 | Status: SHIPPED | OUTPATIENT
Start: 2020-02-27 | End: 2021-03-01

## 2020-05-14 DIAGNOSIS — K30 NUD (NONULCER DYSPEPSIA): ICD-10-CM

## 2020-05-14 RX ORDER — ONDANSETRON 4 MG/1
TABLET, FILM COATED ORAL
Qty: 30 TABLET | Refills: 0 | Status: SHIPPED | OUTPATIENT
Start: 2020-05-14 | End: 2020-12-28

## 2020-05-26 ENCOUNTER — TELEPHONE (OUTPATIENT)
Dept: INTERNAL MEDICINE | Facility: CLINIC | Age: 85
End: 2020-05-26

## 2020-05-26 NOTE — TELEPHONE ENCOUNTER
----- Message from Ji Kraus sent at 5/26/2020  2:47 PM CDT -----  Contact: Patient [phone]  Sooner appointment than the  can schedule.  Did you offer to schedule the next available appointment and put the patient on the wait list?:    When is the first available appointment: not slot  What is the nature of the appointment: epp  What visit type: epp  Patient preference of timeframe to be scheduled:    Comments: patient stating can not make appt is needing appt for June.    Please call an advise  Thank you

## 2020-08-05 ENCOUNTER — TELEPHONE (OUTPATIENT)
Dept: INTERNAL MEDICINE | Facility: CLINIC | Age: 85
End: 2020-08-05

## 2020-08-05 NOTE — TELEPHONE ENCOUNTER
Spoke to patient and will cancel appts on Monday---patient is aware that Dr. Talbot will no longer be in Primary Care---list of physicians sent via my [facility] to patient---he will call to set up appt when things feel safe

## 2020-08-05 NOTE — TELEPHONE ENCOUNTER
----- Message from Yolis Gerardo sent at 8/5/2020  2:17 PM CDT -----  Contact: self/[phone]  Patient calls want to talk with Dr Talbot medical assistant about rescheduling his and his wife urmila for the middle of sept . (unable to find an open urmila). Please call and advise. Thank you

## 2020-09-29 ENCOUNTER — PATIENT MESSAGE (OUTPATIENT)
Dept: OTHER | Facility: OTHER | Age: 85
End: 2020-09-29

## 2020-12-11 ENCOUNTER — PATIENT MESSAGE (OUTPATIENT)
Dept: OTHER | Facility: OTHER | Age: 85
End: 2020-12-11

## 2021-01-13 ENCOUNTER — IMMUNIZATION (OUTPATIENT)
Dept: FAMILY MEDICINE | Facility: CLINIC | Age: 86
End: 2021-01-13
Payer: MEDICARE

## 2021-01-13 DIAGNOSIS — Z23 NEED FOR VACCINATION: ICD-10-CM

## 2021-01-13 PROCEDURE — 91301 COVID-19, MRNA, LNP-S, PF, 100 MCG/0.5 ML DOSE VACCINE: ICD-10-PCS | Mod: ,,, | Performed by: FAMILY MEDICINE

## 2021-01-13 PROCEDURE — 0011A COVID-19, MRNA, LNP-S, PF, 100 MCG/0.5 ML DOSE VACCINE: CPT | Mod: ,,, | Performed by: FAMILY MEDICINE

## 2021-01-13 PROCEDURE — 0011A COVID-19, MRNA, LNP-S, PF, 100 MCG/0.5 ML DOSE VACCINE: ICD-10-PCS | Mod: ,,, | Performed by: FAMILY MEDICINE

## 2021-01-13 PROCEDURE — 91301 COVID-19, MRNA, LNP-S, PF, 100 MCG/0.5 ML DOSE VACCINE: CPT | Mod: ,,, | Performed by: FAMILY MEDICINE

## 2021-02-10 ENCOUNTER — IMMUNIZATION (OUTPATIENT)
Dept: FAMILY MEDICINE | Facility: CLINIC | Age: 86
End: 2021-02-10
Payer: MEDICARE

## 2021-02-10 DIAGNOSIS — Z23 NEED FOR VACCINATION: Primary | ICD-10-CM

## 2021-02-10 PROCEDURE — 91301 COVID-19, MRNA, LNP-S, PF, 100 MCG/0.5 ML DOSE VACCINE: ICD-10-PCS | Mod: S$GLB,,, | Performed by: FAMILY MEDICINE

## 2021-02-10 PROCEDURE — 0012A COVID-19, MRNA, LNP-S, PF, 100 MCG/0.5 ML DOSE VACCINE: ICD-10-PCS | Mod: CV19,S$GLB,, | Performed by: FAMILY MEDICINE

## 2021-02-10 PROCEDURE — 91301 COVID-19, MRNA, LNP-S, PF, 100 MCG/0.5 ML DOSE VACCINE: CPT | Mod: S$GLB,,, | Performed by: FAMILY MEDICINE

## 2021-02-10 PROCEDURE — 0012A COVID-19, MRNA, LNP-S, PF, 100 MCG/0.5 ML DOSE VACCINE: CPT | Mod: CV19,S$GLB,, | Performed by: FAMILY MEDICINE

## 2022-06-07 ENCOUNTER — TELEPHONE (OUTPATIENT)
Dept: NEUROLOGY | Facility: CLINIC | Age: 87
End: 2022-06-07
Payer: MEDICARE

## 2022-06-07 NOTE — TELEPHONE ENCOUNTER
----- Message from Giselle Davis sent at 6/7/2022  1:38 PM CDT -----  Regarding: Ekaterina/ Daughter/  [phone]  Type: Patient Call Back    Who called:  Daughter    What is the request in detail:  Patients daughter would like a call from staff.  She's wanting to know how long will 2nd opinion for dementia evaluation will be.  Daughter stated she's just needing help please.  Thank you     Would the patient rather a call back or a response via [facility]?   Call back    Best call back number:   [phone]          Thank you

## 2022-06-07 NOTE — TELEPHONE ENCOUNTER
Called and spoke with patient daughter and scheduled patient to come in to see Dr. Miller. Appointment letter placed in the mail.

## 2022-08-16 ENCOUNTER — TELEPHONE (OUTPATIENT)
Dept: PRIMARY CARE CLINIC | Facility: CLINIC | Age: 87
End: 2022-08-16
Payer: MEDICARE

## 2022-08-16 NOTE — TELEPHONE ENCOUNTER
Spoke with pt dtrSusan. She needs a PCP for her physician.  She would like an appt with NP then with physician in November.    Scheduled with JESSICA Sharma DNP, 8/25/2022, Kindra verbalized understanding.

## 2022-08-25 ENCOUNTER — OFFICE VISIT (OUTPATIENT)
Dept: PRIMARY CARE CLINIC | Facility: CLINIC | Age: 87
End: 2022-08-25
Payer: MEDICARE

## 2022-08-25 ENCOUNTER — IMMUNIZATION (OUTPATIENT)
Dept: PHARMACY | Facility: CLINIC | Age: 87
End: 2022-08-25
Payer: MEDICARE

## 2022-08-25 VITALS
DIASTOLIC BLOOD PRESSURE: 62 MMHG | OXYGEN SATURATION: 95 % | SYSTOLIC BLOOD PRESSURE: 128 MMHG | RESPIRATION RATE: 16 BRPM | HEART RATE: 62 BPM | BODY MASS INDEX: 27.78 KG/M2 | TEMPERATURE: 99 F | WEIGHT: 198.44 LBS | HEIGHT: 71 IN

## 2022-08-25 DIAGNOSIS — K59.00 CONSTIPATION, UNSPECIFIED CONSTIPATION TYPE: ICD-10-CM

## 2022-08-25 DIAGNOSIS — F41.1 GAD (GENERALIZED ANXIETY DISORDER): ICD-10-CM

## 2022-08-25 DIAGNOSIS — C61 PROSTATE CANCER: ICD-10-CM

## 2022-08-25 DIAGNOSIS — G47.33 OSA (OBSTRUCTIVE SLEEP APNEA): ICD-10-CM

## 2022-08-25 DIAGNOSIS — J30.2 SEASONAL ALLERGIES: ICD-10-CM

## 2022-08-25 DIAGNOSIS — Z85.46 HISTORY OF PROSTATE CANCER: ICD-10-CM

## 2022-08-25 DIAGNOSIS — F02.80 ALZHEIMER'S DISEASE WITHOUT BEHAVIORAL DISTURBANCE: ICD-10-CM

## 2022-08-25 DIAGNOSIS — I70.209 ATHEROSCLEROSIS OF ARTERIES OF EXTREMITIES: ICD-10-CM

## 2022-08-25 DIAGNOSIS — D50.8 OTHER IRON DEFICIENCY ANEMIA: ICD-10-CM

## 2022-08-25 DIAGNOSIS — Z23 NEED FOR VACCINATION: Primary | ICD-10-CM

## 2022-08-25 DIAGNOSIS — I10 ESSENTIAL HYPERTENSION: ICD-10-CM

## 2022-08-25 DIAGNOSIS — K64.8 INTERNAL HEMORRHOIDS: ICD-10-CM

## 2022-08-25 DIAGNOSIS — F41.9 ANXIETY: ICD-10-CM

## 2022-08-25 DIAGNOSIS — N18.32 STAGE 3B CHRONIC KIDNEY DISEASE: ICD-10-CM

## 2022-08-25 DIAGNOSIS — H21.302: Primary | ICD-10-CM

## 2022-08-25 DIAGNOSIS — I70.0 AORTIC ATHEROSCLEROSIS: ICD-10-CM

## 2022-08-25 DIAGNOSIS — Z13.29 SCREENING FOR THYROID DISORDER: ICD-10-CM

## 2022-08-25 DIAGNOSIS — K30 NUD (NONULCER DYSPEPSIA): ICD-10-CM

## 2022-08-25 DIAGNOSIS — E78.5 HYPERLIPIDEMIA, UNSPECIFIED HYPERLIPIDEMIA TYPE: ICD-10-CM

## 2022-08-25 DIAGNOSIS — G47.33 OSA ON CPAP: ICD-10-CM

## 2022-08-25 DIAGNOSIS — G30.9 ALZHEIMER'S DISEASE WITHOUT BEHAVIORAL DISTURBANCE: ICD-10-CM

## 2022-08-25 DIAGNOSIS — K21.9 GASTROESOPHAGEAL REFLUX DISEASE, UNSPECIFIED WHETHER ESOPHAGITIS PRESENT: ICD-10-CM

## 2022-08-25 DIAGNOSIS — R21 RASH OF FACE: ICD-10-CM

## 2022-08-25 DIAGNOSIS — H26.9 CATARACT, UNSPECIFIED CATARACT TYPE, UNSPECIFIED LATERALITY: ICD-10-CM

## 2022-08-25 PROCEDURE — 99214 PR OFFICE/OUTPT VISIT, EST, LEVL IV, 30-39 MIN: ICD-10-PCS | Mod: S$GLB,,, | Performed by: NURSE PRACTITIONER

## 2022-08-25 PROCEDURE — 99214 OFFICE O/P EST MOD 30 MIN: CPT | Mod: S$GLB,,, | Performed by: NURSE PRACTITIONER

## 2022-08-25 RX ORDER — OMEPRAZOLE 40 MG/1
40 CAPSULE, DELAYED RELEASE ORAL EVERY MORNING
Qty: 30 CAPSULE | Refills: 11 | Status: SHIPPED | OUTPATIENT
Start: 2022-08-25 | End: 2022-09-05 | Stop reason: SDUPTHER

## 2022-08-25 RX ORDER — GALANTAMINE 4 MG/1
4 TABLET, FILM COATED ORAL 2 TIMES DAILY WITH MEALS
Qty: 60 TABLET | Refills: 11 | Status: SHIPPED | OUTPATIENT
Start: 2022-08-25 | End: 2022-09-05 | Stop reason: SDUPTHER

## 2022-08-25 RX ORDER — METOPROLOL SUCCINATE 50 MG/1
50 TABLET, EXTENDED RELEASE ORAL DAILY
Qty: 30 TABLET | Refills: 11 | Status: SHIPPED | OUTPATIENT
Start: 2022-08-25 | End: 2022-09-05 | Stop reason: SDUPTHER

## 2022-08-25 RX ORDER — ONDANSETRON 4 MG/1
4 TABLET, FILM COATED ORAL EVERY 8 HOURS PRN
Qty: 30 TABLET | Refills: 1 | Status: SHIPPED | OUTPATIENT
Start: 2022-08-25 | End: 2022-08-31

## 2022-08-25 RX ORDER — SERTRALINE HYDROCHLORIDE 100 MG/1
100 TABLET, FILM COATED ORAL DAILY
Qty: 90 TABLET | Refills: 0 | Status: SHIPPED | OUTPATIENT
Start: 2022-08-25 | End: 2022-09-05 | Stop reason: SDUPTHER

## 2022-08-25 RX ORDER — LOSARTAN POTASSIUM 50 MG/1
50 TABLET ORAL DAILY
Qty: 90 TABLET | Refills: 3 | Status: SHIPPED | OUTPATIENT
Start: 2022-08-25 | End: 2022-09-05 | Stop reason: SDUPTHER

## 2022-08-25 RX ORDER — MONTELUKAST SODIUM 10 MG/1
10 TABLET ORAL NIGHTLY
Qty: 30 TABLET | Refills: 0 | Status: SHIPPED | OUTPATIENT
Start: 2022-08-25 | End: 2022-09-05 | Stop reason: SDUPTHER

## 2022-08-25 RX ORDER — ATORVASTATIN CALCIUM 10 MG/1
40 TABLET, FILM COATED ORAL DAILY
Qty: 30 TABLET | Refills: 11 | Status: SHIPPED | OUTPATIENT
Start: 2022-08-25 | End: 2022-09-05 | Stop reason: SDUPTHER

## 2022-08-25 RX ORDER — OXYBUTYNIN CHLORIDE 10 MG/1
10 TABLET, EXTENDED RELEASE ORAL DAILY
Qty: 30 TABLET | Refills: 11 | Status: SHIPPED | OUTPATIENT
Start: 2022-08-25 | End: 2022-09-05 | Stop reason: SDUPTHER

## 2022-08-25 NOTE — PROGRESS NOTES
Primary Care Provider Appointment- BISI Menjivar      Patient ID: Hu Lopez is a 86 y.o. male.    Chief Complaint: Establish Care, Skin Tags, and Dementia    HPI:  This is an 85 y/o male who is here to establish care.  He resides at the [facility] and at this time has no primary care physician.  He has dementia with memory loss and is calm and humble on today.  His caregiver is escorting him and has concerns regarding Dizziness and wart on his eye and arm which has been there a while.  His PMHX is noted below.    Past Medical History:   Diagnosis Date    Allergy     Basal cell carcinoma     BPH (benign prostatic hyperplasia)     CAD (coronary artery disease)     Cataracts, bilateral     Diverticulitis     DVT (deep venous thrombosis)     Elevated PSA     GERD (gastroesophageal reflux disease)     Glaucoma     Hyperlipidemia     Hypertension     Prostate cancer     Skin cancer     Sleep apnea     CPAP       Past Surgical History:   Procedure Laterality Date    APPENDECTOMY      COLON SURGERY      75,05    COLONOSCOPY      11    COLOSTOMY      PROSTATE SURGERY  2008    REVISION COLOSTOMY      SKIN CANCER EXCISION      chin    UPPER GASTROINTESTINAL ENDOSCOPY      11       Family History   Problem Relation Age of Onset    Prostate cancer Father     Colon cancer Paternal Uncle     Stroke Paternal Uncle     Depression Paternal Uncle     No Known Problems Mother     Heart attack Brother 70       Social History     Socioeconomic History    Marital status:    Tobacco Use    Smoking status: Never    Smokeless tobacco: Never   Substance and Sexual Activity    Alcohol use: Yes     Alcohol/week: 0.0 standard drinks     Comment: rare    Drug use: No    Sexual activity: Never   Social History Narrative    3 children - healthy.       Review of patient's allergies indicates:   Allergen Reactions    Codeine Nausea And Vomiting    Simcor [niacin-simvastatin] Rash    Simvastatin Rash        Review of Systems  "  Constitutional: Negative.    HENT: Negative.     Eyes: Negative.    Respiratory: Negative.     Cardiovascular: Negative.    Gastrointestinal: Negative.    Genitourinary: Negative.    Musculoskeletal: Negative.         Lower ext weakness requiring contact guard assistance to ambulate, presented in a wheelchair.   Skin: Negative.         As per patient caregiver, he has wart on his eye and arm.   Neurological:  Positive for dizziness.   Endo/Heme/Allergies: Negative.    Psychiatric/Behavioral: Negative.          Objective:   /62   Pulse 62   Temp 98.7 °F (37.1 °C) (Oral)   Resp 16   Ht 5' 11" (1.803 m)   Wt 90 kg (198 lb 6.6 oz)   SpO2 95%   BMI 27.67 kg/m²     Physical Exam  Vitals reviewed.   Constitutional:       Appearance: Normal appearance. He is normal weight.   HENT:      Head: Normocephalic.      Nose: Nose normal.      Mouth/Throat:      Mouth: Mucous membranes are moist.      Pharynx: Oropharynx is clear.   Eyes:      Extraocular Movements: Extraocular movements intact.      Conjunctiva/sclera: Conjunctivae normal.      Pupils: Pupils are equal, round, and reactive to light.   Cardiovascular:      Rate and Rhythm: Normal rate and regular rhythm.      Pulses: Normal pulses.      Heart sounds: Normal heart sounds.   Pulmonary:      Effort: Pulmonary effort is normal.      Breath sounds: Normal breath sounds.   Abdominal:      General: Abdomen is flat. Bowel sounds are normal.      Palpations: Abdomen is soft.   Musculoskeletal:         General: Normal range of motion.      Cervical back: Normal range of motion and neck supple.   Skin:     General: Skin is warm and dry.      Capillary Refill: Capillary refill takes 2 to 3 seconds.   Neurological:      General: No focal deficit present.      Mental Status: He is alert and oriented to person, place, and time. Mental status is at baseline.   Psychiatric:         Mood and Affect: Mood normal.         Behavior: Behavior normal.         Thought " Content: Thought content normal.         Judgment: Judgment normal.            Lab Results   Component Value Date    WBC 5.62 08/31/2022    HGB 13.5 (L) 08/31/2022    HCT 39.2 (L) 08/31/2022     08/31/2022    CHOL 142 08/25/2022    TRIG 87 08/25/2022    HDL 42 08/25/2022    ALT 21 08/31/2022    AST 21 08/31/2022     08/31/2022    K 4.3 08/31/2022     08/31/2022    CREATININE 1.8 (H) 08/31/2022    BUN 28 (H) 08/31/2022    CO2 23 08/31/2022    TSH 1.779 08/31/2022    PSA 0.34 06/20/2017    INR 1.0 06/04/2012    HGBA1C 5.5 06/20/2017       Medication List with Changes/Refills   New Medications    DEXTRAN 70-HYPROMELLOSE (TEARS) OPHTHALMIC SOLUTION    Place 1 drop into both eyes as needed.    GALANTAMINE (RAZADYNE) 4 MG TAB    Take 1 tablet (4 mg total) by mouth 2 (two) times daily with meals.    LOSARTAN (COZAAR) 50 MG TABLET    Take 1 tablet (50 mg total) by mouth once daily.    METOPROLOL SUCCINATE (TOPROL-XL) 50 MG 24 HR TABLET    Take 1 tablet (50 mg total) by mouth once daily.    MIRABEGRON (MYRBETRIQ) 25 MG TB24 ER TABLET    Take 1 tablet (25 mg total) by mouth once daily.    MONTELUKAST (SINGULAIR) 10 MG TABLET    Take 1 tablet (10 mg total) by mouth every evening.    OXYBUTYNIN (DITROPAN-XL) 10 MG 24 HR TABLET    Take 1 tablet (10 mg total) by mouth once daily.    SUVOREXANT (BELSOMRA) 10 MG TAB    Take 1 tablet by mouth every evening.   Current Medications    DESONIDE (DESOWEN) 0.05 % CREAM        PROCTOFOAM HC RECTAL FOAM    USE ONE APPLICATIORFUL RECTALLY TWICE DAILY    TRIAMCINOLONE ACETONIDE 0.025 % LOTN    Apply 1 application topically 2 (two) times daily.   Changed and/or Refilled Medications    Modified Medication Previous Medication    ATORVASTATIN (LIPITOR) 10 MG TABLET atorvastatin (LIPITOR) 10 MG tablet       Take 4 tablets (40 mg total) by mouth once daily.    Take 40 mg by mouth.     IRON-VITAMIN C 100-250 MG, ICAR-C, 100-250 MG TAB iron-vitamin C 100-250 mg, ICAR-C, 100-250 mg  Tab       Take 1 tablet by mouth once daily. for 90 days    TAKE 1 TABLET BY MOUTH ONCE DAILY FOR 90 DAYS    MULTIVITAMIN (THERAGRAN) PER TABLET multivitamin (THERAGRAN) per tablet       Take 1 tablet by mouth once daily.    Take 1 tablet by mouth once daily.    OMEPRAZOLE (PRILOSEC) 40 MG CAPSULE omeprazole (PRILOSEC) 40 MG capsule       Take 1 capsule (40 mg total) by mouth every morning.        POLYETHYLENE GLYCOL (GLYCOLAX) 17 GRAM PWPK polyethylene glycol (GLYCOLAX) 17 gram PwPk       Take 17 g by mouth once daily.    Take by mouth as needed.    SERTRALINE (ZOLOFT) 100 MG TABLET sertraline (ZOLOFT) 100 MG tablet       Take 1 tablet (100 mg total) by mouth once daily.    Take 1 tablet by mouth once daily   Discontinued Medications    ASPIRIN (ECOTRIN) 81 MG EC TABLET    Take 81 mg by mouth once daily.     CICLOPIROX (PENLAC) 8 % SOLN    Apply topically nightly.    HYDROCODONE-ACETAMINOPHEN (NORCO) 5-325 MG PER TABLET    Take 1 tablet by mouth every 6 (six) hours as needed for Pain.    LANSOPRAZOLE (PREVACID) 15 MG CAPSULE    Take 15 mg by mouth.    MELATONIN 5 MG TAB    Take 10 mg by mouth every evening.     METOPROLOL SUCCINATE (TOPROL-XL) 100 MG 24 HR TABLET    Take 1 tablet (100 mg total) by mouth once daily.    ONDANSETRON (ZOFRAN) 4 MG TABLET    TAKE 1 TABLET BY MOUTH EVERY 8 HOURS AS NEEDED FOR NAUSEA    ONDANSETRON (ZOFRAN) 4 MG TABLET    Take 1 tablet (4 mg total) by mouth every 8 (eight) hours as needed.    OXYBUTYNIN (DITROPAN-XL) 10 MG 24 HR TABLET    TAKE 1 TABLET BY MOUTH ONCE DAILY    PROMETHAZINE (PHENERGAN) 25 MG TABLET    Take 1 tablet (25 mg total) by mouth every 4 (four) hours.    PSYLLIUM (METAMUCIL) POWDER    Take 1 packet by mouth once daily.    SUCRALFATE (CARAFATE) 100 MG/ML SUSPENSION    Take 10 mLs (1 g total) by mouth 4 (four) times daily with meals and nightly.          Assessment:   86 y.o. male with multiple co-morbid illnesses here to follow up with PCP and continue work-up of  chronic issues notably.     1. Cyst of anterior chamber of left eye  Ambulatory referral/consult to Dermatology    dextran 70-hypromellose (TEARS) ophthalmic solution      2. Alzheimer's disease without behavioral disturbance  galantamine (RAZADYNE) 4 MG Tab    DISCONTINUED: galantamine (RAZADYNE) 4 MG Tab      3. Essential hypertension  losartan (COZAAR) 50 MG tablet    metoprolol succinate (TOPROL-XL) 50 MG 24 hr tablet    DISCONTINUED: losartan (COZAAR) 50 MG tablet    DISCONTINUED: metoprolol succinate (TOPROL-XL) 50 MG 24 hr tablet      4. NUD (nonulcer dyspepsia)  Comprehensive Metabolic Panel    omeprazole (PRILOSEC) 40 MG capsule    DISCONTINUED: ondansetron (ZOFRAN) 4 MG tablet    DISCONTINUED: omeprazole (PRILOSEC) 40 MG capsule      5. Anxiety  sertraline (ZOLOFT) 100 MG tablet    DISCONTINUED: sertraline (ZOLOFT) 100 MG tablet      6. Hyperlipidemia, unspecified hyperlipidemia type  Lipid Panel    atorvastatin (LIPITOR) 10 MG tablet    DISCONTINUED: atorvastatin (LIPITOR) 10 MG tablet      7. History of prostate cancer  PSA, Total and Free    CBC Auto Differential    oxybutynin (DITROPAN-XL) 10 MG 24 hr tablet    DISCONTINUED: oxybutynin (DITROPAN-XL) 10 MG 24 hr tablet      8. CALVIN (obstructive sleep apnea)  Ambulatory referral/consult to Sleep Disorders      9. Cataract, unspecified cataract type, unspecified laterality  Ambulatory referral/consult to Ophthalmology      10. Screening for thyroid disorder        11. PARVEEN (generalized anxiety disorder)        12. Aortic atherosclerosis        13. Atherosclerosis of arteries of extremities        14. Stage 3b chronic kidney disease        15. Prostate cancer        16. Gastroesophageal reflux disease, unspecified whether esophagitis present        17. CALVIN on CPAP        18. Internal hemorrhoids        19. Rash of face        20. Other iron deficiency anemia  iron-vitamin C 100-250 mg, ICAR-C, 100-250 mg Tab    multivitamin (THERAGRAN) per tablet      21.  "Seasonal allergies  montelukast (SINGULAIR) 10 mg tablet      22. Constipation, unspecified constipation type  polyethylene glycol (GLYCOLAX) 17 gram PwPk           Plan:     1. Cyst of anterior chamber of left eye  -     Ambulatory referral/consult to Dermatology  -     dextran 70-hypromellose (TEARS) ophthalmic solution    2. Alzheimer's disease without behavioral disturbance  Assessment & Plan:  Appointment with Neuro on Au 31 rst to re-evaluate.  Only taking Zoloft 100 mg po daily at this time.    Orders:  -     galantamine (RAZADYNE) 4 MG Tab    3. Essential hypertension  Assessment & Plan:  Continue Metoprolol 100 mg at this time.    Orders:  -     losartan (COZAAR) 50 MG tablet  -     metoprolol succinate (TOPROL-XL) 50 MG 24 hr tablet    4. NUD (nonulcer dyspepsia)  -     Comprehensive Metabolic Panel  -     omeprazole (PRILOSEC) 40 MG capsule    5. Anxiety  -     sertraline (ZOLOFT) 100 MG tablet    6. Hyperlipidemia, unspecified hyperlipidemia type  Assessment & Plan:  Continue Atorvastatin 10 mg po q day    Orders:  -     Lipid Panel  -     atorvastatin (LIPITOR) 10 MG tablet    7. History of prostate cancer  -     PSA, Total and Free  -     CBC Auto Differential  -     oxybutynin (DITROPAN-XL) 10 MG 24 hr tablet    8. CALVIN (obstructive sleep apnea)  -     Ambulatory referral/consult to Sleep Disorders    9. Cataract, unspecified cataract type, unspecified laterality  -     Ambulatory referral/consult to Ophthalmology    10. Screening for thyroid disorder    11. PARVEEN (generalized anxiety disorder)  Assessment & Plan:  Continue Zoloft 100 mg daily.      12. Aortic atherosclerosis  Overview:  Location in Record and Date:  CT Abdomen Pelvis-11/14/2016    "Calcified aortoiliac atherosclerosis is identified."   Location in Record and Date:  CT Pelvis-2/5/2016    "The distal abdominal aorta and common iliac arteries are ectatic demonstrate atherosclerosis."  Location in Record and Date:  CT Abdomen-12/16/2015  " "  "The abdominal aorta demonstrates extensive calcific atherosclerosis and is tortuous."  Other Chronic Conditions:  HLD  Medications:  aspirin 81 mg, Lipitor 10 mg    Assessment & Plan:  No current symptoms of worsening disease at this time.      13. Atherosclerosis of arteries of extremities  Overview:  Location in Record and Date:  X-Ray ankle-6/15/2017    "Small vascular calcifications are noted at the ankle."    Location in Record and Date:  X-Ray Knee Bilateral-9/21/2011    "THE PATIENT HAS CALCIFIC ATHEROSCLEROSIS."    Other Chronic Conditions:  HLD  Medications:  aspirin 81 mg, Lipitor 10 mg    Assessment & Plan:  Minimal ambulation at this time without assistance.      14. Stage 3b chronic kidney disease  Assessment & Plan:  GFR 38.8 at this time, last documented here in 2018 and was 46.      15. Prostate cancer  Assessment & Plan:  PSA, NL. 2.0.      16. Gastroesophageal reflux disease, unspecified whether esophagitis present  Assessment & Plan:  Presently taking prevacid, continue current dose.      17. CALVIN on CPAP  Assessment & Plan:  Compliant with CPAP.      18. Internal hemorrhoids    19. Rash of face    20. Other iron deficiency anemia  -     iron-vitamin C 100-250 mg, ICAR-C, 100-250 mg Tab  -     multivitamin (THERAGRAN) per tablet    21. Seasonal allergies  -     montelukast (SINGULAIR) 10 mg tablet    22. Constipation, unspecified constipation type  -     polyethylene glycol (GLYCOLAX) 17 gram PwPk     Health Maintenance         Date Due Completion Date    Shingles Vaccine (1 of 2) Never done ---    COVID-19 Vaccine (4 - Booster for Moderna series) 04/20/2022 12/20/2021    Influenza Vaccine (1) 09/01/2022 10/5/2019    TETANUS VACCINE 06/15/2024 6/15/2014 (Done)    Override on 6/15/2014: Done    Lipid Panel 07/23/2024 7/23/2019            Follow up in about 2 months (around 10/25/2022), or if symptoms worsen or fail to improve. 60 min spent with patient in face to face encounter. The following " issues were addressed diagnosis, medications, labs, diagnostic test and health maintenance.            Dr. Elle Sharma, DNP, MSN, CHFN, ACNP, APRN, B.C.  [facility]  [facility]  [facility] [phone]

## 2022-08-31 ENCOUNTER — OFFICE VISIT (OUTPATIENT)
Dept: NEUROLOGY | Facility: CLINIC | Age: 87
End: 2022-08-31
Payer: MEDICARE

## 2022-08-31 ENCOUNTER — HOSPITAL ENCOUNTER (OUTPATIENT)
Dept: CARDIOLOGY | Facility: CLINIC | Age: 87
Discharge: HOME | End: 2022-08-31
Payer: MEDICARE

## 2022-08-31 VITALS
BODY MASS INDEX: 27.55 KG/M2 | DIASTOLIC BLOOD PRESSURE: 70 MMHG | SYSTOLIC BLOOD PRESSURE: 104 MMHG | HEART RATE: 65 BPM | WEIGHT: 197.56 LBS

## 2022-08-31 DIAGNOSIS — G31.84 MCI (MILD COGNITIVE IMPAIRMENT): Primary | ICD-10-CM

## 2022-08-31 DIAGNOSIS — F02.80 FRONTOTEMPORAL LOBAR DEGENERATION: ICD-10-CM

## 2022-08-31 DIAGNOSIS — G20.C PARKINSONISM, UNSPECIFIED PARKINSONISM TYPE: ICD-10-CM

## 2022-08-31 DIAGNOSIS — G47.01 INSOMNIA DUE TO MEDICAL CONDITION: ICD-10-CM

## 2022-08-31 DIAGNOSIS — R32 URINARY INCONTINENCE, UNSPECIFIED TYPE: ICD-10-CM

## 2022-08-31 DIAGNOSIS — E64.0 SEQUELAE OF PROTEIN-CALORIE MALNUTRITION: ICD-10-CM

## 2022-08-31 DIAGNOSIS — G31.84 MCI (MILD COGNITIVE IMPAIRMENT): ICD-10-CM

## 2022-08-31 DIAGNOSIS — G31.09 FRONTOTEMPORAL LOBAR DEGENERATION: ICD-10-CM

## 2022-08-31 DIAGNOSIS — F42.9 OBSESSIVE-COMPULSIVE DISORDER, UNSPECIFIED TYPE: ICD-10-CM

## 2022-08-31 DIAGNOSIS — G47.33 OSA (OBSTRUCTIVE SLEEP APNEA): ICD-10-CM

## 2022-08-31 DIAGNOSIS — R46.89 DISINHIBITION BEHAVIOR: ICD-10-CM

## 2022-08-31 DIAGNOSIS — E72.19 OTHER DISORDERS OF SULFUR-BEARING AMINO-ACID METABOLISM: ICD-10-CM

## 2022-08-31 PROCEDURE — 99214 OFFICE O/P EST MOD 30 MIN: CPT | Mod: PBBFAC | Performed by: PSYCHIATRY & NEUROLOGY

## 2022-08-31 PROCEDURE — 99205 OFFICE O/P NEW HI 60 MIN: CPT | Mod: 25,S$PBB,, | Performed by: PSYCHIATRY & NEUROLOGY

## 2022-08-31 PROCEDURE — 99999 PR PBB SHADOW E&M-EST. PATIENT-LVL IV: ICD-10-PCS | Mod: PBBFAC,,, | Performed by: PSYCHIATRY & NEUROLOGY

## 2022-08-31 PROCEDURE — 93010 EKG 12-LEAD: ICD-10-PCS | Mod: S$PBB,,, | Performed by: INTERNAL MEDICINE

## 2022-08-31 PROCEDURE — 93005 ELECTROCARDIOGRAM TRACING: CPT | Mod: PBBFAC | Performed by: INTERNAL MEDICINE

## 2022-08-31 PROCEDURE — 99205 PR OFFICE/OUTPT VISIT, NEW, LEVL V, 60-74 MIN: ICD-10-PCS | Mod: 25,S$PBB,, | Performed by: PSYCHIATRY & NEUROLOGY

## 2022-08-31 PROCEDURE — 96132 PR NEUROPSYCHOLOGIC TEST EVAL SVCS, 1ST HR: ICD-10-PCS | Mod: 59,,, | Performed by: PSYCHIATRY & NEUROLOGY

## 2022-08-31 PROCEDURE — 96116 NUBHVL XM PHYS/QHP 1ST HR: CPT | Mod: S$PBB,59,, | Performed by: PSYCHIATRY & NEUROLOGY

## 2022-08-31 PROCEDURE — 96132 NRPSYC TST EVAL PHYS/QHP 1ST: CPT | Mod: 59,,, | Performed by: PSYCHIATRY & NEUROLOGY

## 2022-08-31 PROCEDURE — 96116 PR NEUROBEHAVIORAL STATUS EXAM BY PSYCH/PHYS: ICD-10-PCS | Mod: S$PBB,59,, | Performed by: PSYCHIATRY & NEUROLOGY

## 2022-08-31 PROCEDURE — 96116 NUBHVL XM PHYS/QHP 1ST HR: CPT | Mod: PBBFAC | Performed by: PSYCHIATRY & NEUROLOGY

## 2022-08-31 PROCEDURE — 99999 PR PBB SHADOW E&M-EST. PATIENT-LVL IV: CPT | Mod: PBBFAC,,, | Performed by: PSYCHIATRY & NEUROLOGY

## 2022-08-31 PROCEDURE — 93010 ELECTROCARDIOGRAM REPORT: CPT | Mod: S$PBB,,, | Performed by: INTERNAL MEDICINE

## 2022-08-31 RX ORDER — SUVOREXANT 10 MG/1
1 TABLET, FILM COATED ORAL NIGHTLY
Qty: 30 TABLET | Refills: 3 | Status: SHIPPED | OUTPATIENT
Start: 2022-08-31 | End: 2022-09-12 | Stop reason: SDUPTHER

## 2022-08-31 RX ORDER — MIRABEGRON 25 MG/1
25 TABLET, FILM COATED, EXTENDED RELEASE ORAL DAILY
Qty: 30 TABLET | Refills: 1 | Status: SHIPPED | OUTPATIENT
Start: 2022-08-31 | End: 2022-09-12 | Stop reason: SDUPTHER

## 2022-08-31 NOTE — PROGRESS NOTES
[facility]  [facility]     PATIENT: Hu Lopez  VISIT DATE: 2022  MRN: [id number]  PRIMARY PROVIDER: Primary Doctor No  : 1935       Chief complaint: Progressive Cognitive Impairment     History of present illness:      Mr. Lopez is a 86-year-old right-handed male who presents today to the [facility]'s [facility] due to concerns related to progressive neurocognitive impairment.   Mr. Lopez is accompanied by the daughter and wife who participates in providing history.  Additional information is obtained by reviewing available medical records.     Relevant Background/Context  Known Relevant Genetics:  There is no known relevant genetic testing available.  Known Relevant Family history:  Mother  late 80s/early 90s  Father  70s with bladder cancer  The family denies a history of early/late onset cognitive impairment.  The family denies a history of movement disorder (PD, PDD, tremor, etc).  The family denies a history of motor neuron disease (ALS).  The family denies a history of developmental learning disorder (Dyslexia, ADHD, ASD, etc.).  Paternal Uncle - schizophrenia?/mood NOS  Developmental/Milestones:  The patient/family report no known birth complications or early life problems. The patient met all developmental milestones.  Learning Disorders:  The patient/family report no signs or symptoms suggestive of developmental learning disorder.  Education:  16 years of formal education.  HS.  [employer]  Social History:  Born in [place]. Moved to [place] at age 8 then back to [place] where completed HS. [place]oved to [place] in late 20s  Career/Skills:   - retired early   Relevant Medical History:  Primary osteoarthritis of left knee  PARVEEN since childhood  HTN  HLD  Urinary incontinence  Relevant Exposure/Trauma to CNS:  No History of Traumatic Brain Injury or Concussions  No History of Chronic Mood Disorder  No  "History of Chronic Stress  No History of Chronic Substance Abuse  No History of Malnutrition  No History of Toxic Exposure     Neurocognitive Disorder Features  Onset/Duration:  Aug 2017 (~5-year)  First Symptom:  Behavior/Psychiatric impairment  Progression:  Gradually Progressive  Clinical Course:  Psychiatrist (08/16/2017)  Type: Chart Review. History of anxiety who presents for his family meeting. The patient has been seen in Internal Medicine on 6/15/17 and 7/24/17. He was started on Zoloft 25mg daily at 6/15/17 appointment with plan to increase to 50mg daily after 1 week. At follow up appointment on 7/24/17, he says he started the medication but then stopped because he felt he was urinating more often (had also stopped taking Oxybutynin around that time). The patient reports a history of anxiety but does not feel it has caused problems. Describes anxiety as "feels like things need to be quicker," especially when he worked as an . States he would occasionally get upset if things did not go a certain way. his family reports this was a big issue. his family states he worries and catastrophizes constantly "about everything", especially about his health. They feel the anxiety is so severe that is is causing the entire his family to be more "anxious and on edge. " They feel it is negatively affecting how the family functions and relates. The patient describes frequent worrying since he was a child. Also states that when he was young he would wash his hands over and over until his skin was raw. Says he does not do this anymore. But does have particular fears of germs (i. E. Will not drink from the wine cup at [employer] or let any of his family members do so). He states he checks locks every night before he goes to bed but very rarely rechecks (only when going out of town). his family states he turns the hot water heater and electricity off when he goes out of town - they disagree about whether this is logical. " his family describes anxiety as interfering from his ability to make decisions, especially in regard to spending money. The patient feels many of his traits are from his [place] culture. Denies symptoms of depression, including depressed mood, anhedonia, worthlessness, hopelessness, low energy, difficulty concentrating, changes in appetite/weight, or psychomotor agitation/retardation. Reports chronically poor sleep due to noncompliance with CPAP. Denies history of jamie, psychosis, SI, or HI.  .     Current Presentation  Recent/Interim History:  The patient presents with his daughter and his wife. His daughter is the primary historian. Additional history is gathered from a review of previous medical records. His family reports a lifetime history of mild untreated anxiety and OCD tendencies since childhood. Symptoms are otherwise not bothersome and noninvasive. 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 The patient was otherwise in her normal state of health until, at minimum, 2017, when the patient began treatment for unspecified anxiety NOS. The patient was started on Zoloft intact and gradually titrated up to 100 mg. It is unclear when the patient 1st began describing new cognitive impairment; however, his family recognized a sharp decline in behavior and cognition in early 2021. Due to COVID-related restrictions, his family had minimal contact with the patient and his wife between 2019 and May 2021. Following the patient's series of vaccines, his family began the recognize both him and his wife were doing poorly cognitively.   In the spring of 2021, his family recognized that their home had fallen into disarray. There was damage to the walls and rat feces in the house. It was at this time his family recognized the patient's wife was developing severe cognitive impairment and would later be  diagnosed with dementia. The patient's symptoms, however, were milder. Though he was more forgetful and had limited insight into his wife's condition, his family, at this time, were more concerned of his change in behavior. The patient was repeating himself, forgetting conversations; however, his prior behavioral tendencies had magnified. Previous obsessive tendencies had worsened. His family recollected finding him putting various objects in boxes did not make sense. He would stack boxes within boxes and have an atypical ordering of household appliances. Previous obsessive-compulsive tendencies, such as checking doors and locks, became more dogmatic and ritualistic. The patient's behavioral side became more disinhibited. His family recollects him speaking openly about fat people in a disparaging matter in public, which was a change from his behavior previously. Furthermore, his family recollects on one occasion, he took a photo of his wife while changing, which was also a change in his baseline temperament. His family denies any specific apathy, inertia, empathy, sympathy, or eating-related changes. His family denies any awareness of any specific language changes currently; however, they report intermittent confusion to commands or statements. They are uncertain whether the patient's forgetting or having difficulty understanding certain conversation topics. The patient has gotten lost on more than one occasion however stopped driving at 21 due to progressive vision loss. His family denies any specific motor changes however do report he is walking slower with frequent falls and balancing problems.   Due to progressive vision and hearing loss in 2021, the patient moved to [place] for additional care and surgery. 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 While in [place], the patient was evaluated by the  [facility] and diagnosed with dementia, likely due to Alzheimer's.   The patient presents today requesting 2nd opinion and to be established with the regional neurology team. On presentation, the patient is present inappropriate with a moderate degree of hearing loss and limited insight into the condition and situation.  Unresolved Concern(s) reported by patient/family:  Gait instability/parkinsonism - referral to PT  Insomnia/CALVIN -referral to sleep medicine + belsomra  Prodromal dementia - start ACheI  Hearing loss  Vision Loss        Review of cognitive, visuospatial, motor, sensory, and behavioral systems:     Memory:   Mr. Monroe memory has worsened in the past few years.  He does repeat statements or asks the same question repeatedly.  He does have difficulty remembering recent important conversations.  He does have difficulty remembering recent events.  He does forget information within minutes.  His remote memory is intact.  His recent retrograde memory is intact.  Attention:   His attention and concentration are impaired.  He does not have attentional fluctuations.  He does have difficulty with selective attention.  He does become easily distracted.  He does have difficulty with divided attention.  Executive:   Mr. Monroe cognitive processing speed is slower.  He does have difficulty with working memory.  He does misplace personal items (e.g., keys, cell phone, wallet) more frequently.  He does have difficulty keeping track of his medications.  He does have difficulty with planning/organizing/completing multistep tasks.  He does have difficulty with executive attention.  He does not have difficulty with flexible thinking.  He does not difficulty with self control.  He denies new impulsivity or rash/careless actions.  His judgment is impaired.  Language:   His speech output is affected.  He does not forget people's names more frequently.  He does not have word-finding  difficulties.  Mr. Hickmans speech is fluent and non-effortful.  Mr. Monroe speech is grammatically intact.  He does not make word substitutions.  He does have difficulty reading.  He appears to have impaired comprehension.  Visuospatial:   He has new visuospatial problems.  He has become confused or disoriented in *new*, unfamiliar places.  He does have trouble navigating.  Mr. Lopez does not have visuospatial disorientation.  He does not have difficulty recognizing objects or faces.  He denies problems with driving or parking.  Motor/Coordination:   Mr. Lopez does have difficulty with walking.  He does feel imbalanced.  He has fallen.  He reports new muscle weakness.  He does have difficulty buttoning shirts, operating zippers, or manipulating tools/utensils.  His handwriting has not become micrographic.  He does not have a resting tremor.  He denies having any new involuntary movements and/or muscle jerking.  He does not have swallowing difficulty.  He denies new muscle cramps and twitching.  Sensory:   Mr. Lopez denies new numbness, tingling, paresthesias, or pain.  Mr. Lopez reports new loss of vision, blurry vision, and/or double vision.  Mr. Lopez reports a recent loss of hearing and/or worsening tinnitus.  Mr. Lopez does have anosmia.  Sleep:   Mr. Lopez reports difficulty sleeping.  Mr. Lopez does have difficulty going to sleep.  Mr. Lopez reports difficulty staying asleep and/or frequently awakening at night.  Mr. Lopez does snore and/or have witnessed apneas while sleeping.  When he wakes up in the morning, he does not feel well-rested.  He denies dream-enactment behavior.  He denies symptoms suggestive of restless leg syndrome.  Behavior:   Mr. Lopez's personality has changed.  He does have symptoms of disinhibition and social inappropriateness.  He is exhibiting symptoms to suggest a loss of manners and/or decorum.  He does not appear apathetic or has decreased motivation.  He does not appear to have a  change in inertia.  There is no report that Mr. Lopez has had a change in their emotional expression.  He does not have emotional blunting or lability.  He does have symptoms of irritability and mood lability.  He does not have symptoms of agitation, aggression, or violent outbursts.  His insight into his disease and situation is impaired.  His personal hygiene has become impaired.  He is not exhibiting a diminished response to other people's needs and feelings.  He is not exhibiting a diminished social interest, interrelatedness, or personal warmth.  He denies restlessness.  He does have new and/or worsening simple repetitive behaviors.  His speech has not become simplified or become repetitive/stereotyped.  He reports symptoms that are suggestive of new/worsening complex repetitive/ritualistic compulsions and behaviors.  He does not have symptoms of hyper-religiosity or dogmatism.  His interests/pleasures have not become restrictive, simplified, interrupting, or repetitive.  He denies a change of self-stimulating behavior.  He denies any changes in eating behavior.  He denies increased consumption of food or substances.  He denies oral exploration or consumption of inedible objects.  Psychiatric:   He does feel depressed.  He is exhibiting symptoms of social withdrawal/indifference.  He does have anxiety.  He does not exhibit cycling behavior.  He does not exhibit hyperactive behavior.  He is not exhibited symptoms of paranoia.  He does not have delusions.  He does not have hallucinations.  He does not have a history of sensitivity to neuroleptic/psychotropic medications.  Medical Review of Systems:   Mr. Lopez does have constipation.  Mr. Lopez does have urinary incontinence.  Mr. Lopez reports symptoms that are suggestive of orthostatic lightheadedness.  Mr. Lopez's weight is stable.  Functional status:  Difficulty performing the following Instrumental ADLs:  Housekeeping: No Comment: unclear given  comorbidities  Food Preparation: No  Shopping: No  Ability to Handle Finances: No  Transportation/Driving: Yes Comment: due to vision loss  Household Appliances/Stove: No Comment: unclear given comorbidities  Laundry: No Comment: unclear given comorbidities  Difficulty performing the following Basic ADLs:  Dressing: No  Bathing: No  Toileting: No  Personal hygiene and grooming: No  Feeding: No  Care Management:  Patient/Family Safety Concerns:  Medication Adherence: No  Home Safety: No  Wandered: No  Firearms: No  Fall Risk: No  Home Alone: No       Past Medical History:   Diagnosis Date    Allergy     Basal cell carcinoma     BPH (benign prostatic hyperplasia)     CAD (coronary artery disease)     Cataracts, bilateral     Diverticulitis     DVT (deep venous thrombosis)     Elevated PSA     GERD (gastroesophageal reflux disease)     Glaucoma     Hyperlipidemia     Hypertension     Prostate cancer     Skin cancer     Sleep apnea     CPAP       Past Surgical History:   Procedure Laterality Date    APPENDECTOMY      COLON SURGERY      75,05    COLONOSCOPY      11    COLOSTOMY      PROSTATE SURGERY  2008    REVISION COLOSTOMY      SKIN CANCER EXCISION      chin    UPPER GASTROINTESTINAL ENDOSCOPY      11       Family History   Problem Relation Age of Onset    Prostate cancer Father     Colon cancer Paternal Uncle     Stroke Paternal Uncle     Depression Paternal Uncle     No Known Problems Mother     Heart attack Brother 70       Social History     Socioeconomic History    Marital status:    Tobacco Use    Smoking status: Never    Smokeless tobacco: Never   Substance and Sexual Activity    Alcohol use: Yes     Alcohol/week: 0.0 standard drinks     Comment: rare    Drug use: No    Sexual activity: Never   Social History Narrative    3 children - healthy.       Medication:     Current Outpatient Medications on File Prior to Visit   Medication Sig Dispense Refill    atorvastatin (LIPITOR) 10 MG tablet Take 4 tablets  (40 mg total) by mouth once daily. 30 tablet 11    dextran 70-hypromellose (TEARS) ophthalmic solution Place 1 drop into both eyes as needed. 30 mL 6    losartan (COZAAR) 50 MG tablet Take 1 tablet (50 mg total) by mouth once daily. 90 tablet 3    metoprolol succinate (TOPROL-XL) 50 MG 24 hr tablet Take 1 tablet (50 mg total) by mouth once daily. 30 tablet 11    montelukast (SINGULAIR) 10 mg tablet Take 1 tablet (10 mg total) by mouth every evening. 30 tablet 0    omeprazole (PRILOSEC) 40 MG capsule Take 1 capsule (40 mg total) by mouth every morning. 30 capsule 11    oxybutynin (DITROPAN-XL) 10 MG 24 hr tablet Take 1 tablet (10 mg total) by mouth once daily. 30 tablet 11    polyethylene glycol (GLYCOLAX) 17 gram PwPk Take by mouth as needed.      sertraline (ZOLOFT) 100 MG tablet Take 1 tablet (100 mg total) by mouth once daily. 90 tablet 0    [DISCONTINUED] oxybutynin (DITROPAN-XL) 10 MG 24 hr tablet TAKE 1 TABLET BY MOUTH ONCE DAILY 90 tablet 0    [DISCONTINUED] psyllium (METAMUCIL) powder Take 1 packet by mouth once daily.      desonide (DESOWEN) 0.05 % cream       galantamine (RAZADYNE) 4 MG Tab Take 1 tablet (4 mg total) by mouth 2 (two) times daily with meals. 60 tablet 11    iron-vitamin C 100-250 mg, ICAR-C, 100-250 mg Tab TAKE 1 TABLET BY MOUTH ONCE DAILY FOR 90 DAYS  3    multivitamin (THERAGRAN) per tablet Take 1 tablet by mouth once daily.      PROCTOFOAM HC rectal foam USE ONE APPLICATIORFUL RECTALLY TWICE DAILY (Patient not taking: Reported on 8/31/2022) 10 g 3    triamcinolone acetonide 0.025 % Lotn Apply 1 application topically 2 (two) times daily. 60 mL 0    [DISCONTINUED] aspirin (ECOTRIN) 81 MG EC tablet Take 81 mg by mouth once daily.       [DISCONTINUED] ciclopirox (PENLAC) 8 % Soln Apply topically nightly. (Patient not taking: Reported on 8/31/2022) 6.6 mL 11    [DISCONTINUED] HYDROcodone-acetaminophen (NORCO) 5-325 mg per tablet Take 1 tablet by mouth every 6 (six) hours as needed for Pain.  (Patient not taking: Reported on 8/31/2022) 10 tablet 0    [DISCONTINUED] lansoprazole (PREVACID) 15 MG capsule Take 15 mg by mouth.      [DISCONTINUED] melatonin 5 mg Tab Take 10 mg by mouth every evening.       [DISCONTINUED] ondansetron (ZOFRAN) 4 MG tablet Take 1 tablet (4 mg total) by mouth every 8 (eight) hours as needed. (Patient not taking: Reported on 8/31/2022) 30 tablet 1    [DISCONTINUED] promethazine (PHENERGAN) 25 MG tablet Take 1 tablet (25 mg total) by mouth every 4 (four) hours. (Patient not taking: Reported on 8/31/2022) 10 tablet 0    [DISCONTINUED] sucralfate (CARAFATE) 100 mg/mL suspension Take 10 mLs (1 g total) by mouth 4 (four) times daily with meals and nightly. (Patient not taking: Reported on 8/31/2022) 414 mL 1     No current facility-administered medications on file prior to visit.        Review of patient's allergies indicates:   Allergen Reactions    Codeine Nausea And Vomiting    Simcor [niacin-simvastatin] Rash    Simvastatin Rash       Medications Reconciliation:   I have reconciled the patient's home medications and discharge medications with the patient/family. I have updated all changes.  Refer to After-Visit Medication List.    Objective:  Vital Signs:  Vitals:    08/31/22 0903   BP: 104/70   Pulse: 65     Wt Readings from Last 3 Encounters:   08/31/22 0903 89.6 kg (197 lb 8.5 oz)   08/25/22 1144 90 kg (198 lb 6.6 oz)   02/17/20 1307 94.4 kg (208 lb 3.6 oz)     Body mass index is 27.55 kg/m².     Neurological examination:  Mental Status:   His appearance is normal (hygiene is appropriate; attire is proper and clean).  Throughout the interview, he is cooperative, his eye contact is appropriate.  Mr. Lopez's energy level is abnormal.  Mr. Hickmans energy level is low/hypoactive.  His orientation is normal; Spatial 5/5 (location, the floor of building, city, county, state) and temporal 5/5 (month, day, year, XAVIER) dimensions are accurate.  He has appropriate attention/concentration  "(NIKKI/XAVIER forwards and backward).  He can complete three-step commands.  His fund of knowledge was appropriate for age, culture, and level of education.  His thought process is not logical or goal-oriented.  He demonstrated impaired insight based on actions, awareness of his illness, plans for the future.  He demonstrated good judgment based on actions and plans for the future.  He has no evidence of hallucinations (auditory, visual, olfactory).  He has no evidence of delusions (paranoid, grandiose, bizarre).  Cranial Nerves:   His pupils were normal.  His visual fields were full to confrontation in all quadrants.  His ocular pursuit in the horizontal and vertical plane was complete.  His saccadic initiation, velocity, and amplitude are normal.  His facial strength was normal.  His facial expression was abnormal.  His facial expression was restricted suggestive of hypomimia.  His facial sensation was intact to light touch bilaterally.  His hearing was diminished.  He does require the use of hearing aids.  His oropharynx was non-obstructed with a Mallapati score 1/4. The soft palate elevates symmetrically.  His tongue showed no evidence of scalloping.  He can protrude their tongue beyond His lips for >10 sec.  Speech/Language:   Mr. Hickmans speech was not completely fluent and non-effortful.  His speech volume is within normal range and appropriate to the context.  His speech rate is abnormal.   Comment: slow  His respirations are within normal range and appropriate to context.  His speech timbre is normal.  He made articulation (segmental features) errors.  He has no speech dysdiadochokinesia with repetition of syllables such as "/PA/, /TA/, /KA/, /OM/".  He made no errors during the repetition of rapid syllables and or words such as "caterpillar" "", and "huckleberry"  He has no repetition errors of rapid sequences of consonants, such as in "Taoism Latter day" or "[employer]".  He has no " prosody (suprasegmental features) errors.  Mr. Monroe speech is not dysarthric.  Mr. Lopez showed evidence of anomia.  He showed evidence of anomia during spontaneous speech.  He showed evidence of anomia during confrontational naming.  He makes phonological loop errors.  He has difficulty comprehending commands that depend on syntax.   Comment: 'point to the ceiling after you point to the floor'.  Motor:   Mr. Monroe bilateral upper/lower extremity muscles bulk is abnormal.   Comment: atrophy  Mr. Hickmans upper extremity muscle tone is increased.   Comment: R>L rigidity  Mr. Monroe bilateral upper extremity muscle tone does not suggest spasticity.  There is evidence of rigidity/cogwheeling.   Comment: Muscle tone is increased and there is evidence of rigidity/cogwheeling.  Mr. Monroe bilateral upper extremity muscle tone has no evidence of paratonia.  Assessment of motor strength showed evidence of abnormal weakness.   Comment: mild  weakness bilaterally  There is no pronator or downward drift.  There is no myoclonus observed in Mr. Lopez's bilateral upper and lower extremities.  There are no fasciculations observed in Mr. Hickmans bilateral upper and lower extremities.  Coordination:   He has no bilateral upper extremity limb dysmetria or past pointing on finger-nose-finger bilaterally.  He has no limb dysdiadochokinesia of the upper extremity on the pronation/supination test and screwing in a light bulb or lower extremity during tapping ball of each foot bilaterally.  He has no visible tremor.  He has evidence of interhemispheric motor control deficits.  He demonstrates evidence of motor overflow.  He demonstrates no alien limb phenomena.  He has dyskinetic movements.  He has no akathisia.  He has no tardive dyskinesia.  Mr. Monroe upper extremity fine motor coordination was abnormal.   Comment: mild R>L  Mr. Monroe upper extremity fine motor coordination was not slow.   Comment: finger tapping,  pronation/supination, and the open-close fist was slow.  Mr. Lopez's upper extremity fine motor coordination was not hypometric.   Comment: finger tapping, pronation/supination, and the open-close fist showed hypometria.  Mr. Hickmans upper extremity fine motor coordination was not dysrhythmic.   Comment: finger tapping, pronation/supination, and the open-close fist showed dysrhythmia.  Higher Cortical Function:   Mr. Lopez showed no evidence of simultanagnosia (Navon hierarchical letters).  He demonstrates no evidence of dorsal simultanagnosia (overlapping objects).  He demonstrates no evidence of ventral simultanagnosia (complex picture synthesis).  Mr. Lopez showed evidence of visuospatial constructional dysfunction.   Comment: very mild  Mr. Lopez showed no evidence of agnosia.  Mr. Lopez showed evidence of angular gyrus disconnection (insular-operculum).  He has left-right confusion.  He has evidence of dysgraphia.  Mr. Lopez showed evidence of apraxia.  He showed no evidence of ideomotor apraxia performing tool-use pantomimes (e.g., use a hammer, use a screwdriver, use a comb, flip a coin, waving goodbye).  He showed no evidence of ideational apraxia (e.g., taking off and putting on shoes, folding paper into an envelope).  He showed evidence of limb-motor apraxia during mimicking complex bimanual hand shapes.  He showed no evidence of buccofacial apraxia (e.g., blow out a candle, puff out cheeks, and whistle).  He showed dysexecutive behavior.  He showed no utilization or imitation behavior.  He has evidence of perseverative or stereotyped behavior.  He has no stimulus-bound behavior.  Sensory:   His sensation was diminished to light touch, and vibratory sense.   Comment: in the bilateral upper and lower extremities in a length-dependant pattern.  Reflexes:   Reflexes were symmetric and 2+ at biceps, 2+ triceps, and 2+ brachioradialis, 2+ at the knees bilaterally, there was no cross-abductor sign, 2+ in the  bilateral ankles.  Gait:   He has abnormal posture/sway while sitting unaided.  He is unable to rise from a chair and sit back down without using their arms.  His gait was abnormal.  His posture while walking is abnormal.   Comment: hunched  His gait initiation/inhibition was abnormal.   Comment: blockage  His stance while walking is abnormal.   Comment: narrow  His gait speed was abnormal (70-80 F 1.13 m/s M 1.26 m/s, >80 F 0.94 m/sec, M 0.97 m/sec).   Comment: slow  His stride (gait cycle) was abnormal.   Comment: decreased step-time, step-width, and step-length.  His arms swing is abnormal.   Comment: asymmetric and decreased amplitude RUE  He takes turns in >4 steps.  He has truncal ataxia.  When attempting to walk abnormally (heels, tiptoes, tandem), he makes errors.  While walking on his tiptoes for ten steps, he makes deviations.  While walking on his heels for ten steps, He makes no deviations.  While walking tandem for ten steps, he makes deviations.  While walking with eyes closed for ten steps, He makes no deviations.  He has evidence of posture/balance impairment.  Retropulsion testing showed abnormal recovery.  He has evidence of a specific gait disorder.  He has evidence of parkinsonism gait disorder.   Comment: Gait is rigid akinetic with a short step length, a narrow base, and a stooped posture involving the neck, shoulders, and trunk. Arm swing is reduced. Steppage height is reduced (shuffling). Stride variability is increased. When asked to walk faster, patients increase the step frequency rather than step length.       Neuropsychological Evaluation Summary:     Prior Neurocognitive/Neuropsychological Evaluations  Summary from EMR:    Neurocognitive Evaluation completed on 08/31/2022:  Memory   Registration-3 2/3 Impairment: Moderate.   Recall-3 2/3 Impairment: Moderate.   Recall-5 2/5 Impairment: Significant.   Registration-5 4/0     T1 2/9 Impairment: -3.5 STDs below the average score based on  age and education.   T2 4/9 Impairment: -4.3 STDs below the average score based on age and education.   T3 4/9 Impairment: -3.1 STDs below the average score based on age and education.   T4 4/9 Impairment: -5.7 STDs below the average score based on age and education.   T5 4/9 Impairment: -5.7 STDs below the average score based on age and education.   DR-30 Sec 3/9 Impairment: -1.7 STDs below the average score based on age and education.   DR-10 min 2/9 Within Normal Limits.   DR-Cued 2/9 Impairment: -2.1 STDs below the average score based on age and education.   Executive   Three-step command 3/3 Within Normal Limits.   Trials-1 0/1 Impairment: Significant.   WORLD Backward 3/5 Impairment: Moderate.   Digit Span - 2 2/2 Within Normal Limits.   Serial Sevens 2/3 Impairment: Moderate.   Fluency 0/1 Impairment: Significant.   Digit Span Backwards 4 Within Normal Limits.   Lexical Fluency - D 10 Within Normal Limits.   Lexical Fluency - F 8 Impairment: -1.8 STDs below the average score based on age and education.   Semantic Fluency - Animals 7 Impairment: -2.1 STDs below the average score based on age and education.   Semantic Fluency - Vegetables 9 Impairment: -1.8 STDs below the average score based on age and education.   Visuospatial   Intersecting Pentagons 1/1 Within Normal Limits.   3D Cube Copy 1/1 Within Normal Limits.   Clock Draw 3/3 Within Normal Limits.   [place] Copy 13/17 Impairment: Mild to Normal.   Overlapping Images 5/5 Within Normal Limits.   Picture Synthesis 3/3 Within Normal Limits.   Attention   Orientation-10 10/10 Within Normal Limits.   Orientation-6 6/6 Within Normal Limits.   Alternating Sequence 1/1 Within Normal Limits.   Digit Span Forwards 6 Within Normal Limits.   Language   Repetition-1 1/1 Within Normal Limits.   Naming-2 2/2 Within Normal Limits.   Following written command 1/1 Within Normal Limits.   Writing a complete sentence 1/1 Within Normal Limits.   Naming-3 3/3 Within Normal  Limits.   Repetition-2 2/2 Within Normal Limits.   Abstraction 2/2 Within Normal Limits.   15-Item BNT 9/15 Within Normal Limits.   Repetition of Phrases 5/5 Within Normal Limits.   Verbal Agility 6/6 Within Normal Limits.   SYDBAT - Semantic Association 15/30 Impairment: Moderate.   Repeat & Point - Nonfluent 8/10 Impairment: Mild to Normal.   Repeat & Point - Semantics 5/10 Impairment: Moderate.   Aggregate Scores   MMSE 26/30 MMSE Score suggestive of normal to questionable cognitive impairment.   MOCA 24/30 MOCA Score suggestive of mild cognitive impairment.   Neuropsychiatric/Behavioral Focused Evaluation Assessment   BEHAV5+ 3/6 See ROS section for a full description           Neuroimaging:  No brain imaging is currently available for review.     Procedures:    Electrocardiogram on 6/4/2012  Formal interpretation:  Vent. Rate : 063 BPM     Atrial Rate : 063 BPM    P-R Int : 200 ms          QRS Dur : 084 ms     QT Int : 396 ms       P-R-T Axes : 036 010 045 degrees    QTc Int : 405 ms Normal sinus rhythm Normal ECG  Independently reviewed Electrocardiogram by Óscar Leahy MD. MPH. Behavioral Neurologist  Impression: : Received ECG has no evidence of sinus node disease. HR (>=50-60). Prolonged OR interval (>0.22 s). Broad QRS complex (> 0.12 s).     Clinical Summary:     Mr. Lopez is a 86-year-old right-handed male with a relevant past medical history of HTN, HLD, CALVIN on CPAP, insomnia, PARVEEN, hearing loss, visual impairment, who presents reporting a 5-year history of gradually progressive neurocognitive impairment.       The clinical history is suggestive of:  Memory Impairment: STM encoding impairment, LTM encoding-retrieval impairment, Amnesia of fixation  Attention Impairment: Attention, Selective attention, Sustained attention, Shifting attention  Executive Impairment: Energization, Working Memory, Set-Shifting, Response Inhibition  Language Impairment: Language Dysfunction, Logopenic Dysfunction,  Receptive Dysfunction  Visuospatial Impairment: Allocentric Spatial Processing  Motor/Coordination Impairment: Sensory motor integration, Motor weakness  Sensory Impairment: Sensory Deprivation, Limbic Dysfunction  Behavior Impairment: Emotional Regulation, Self-Preservation Dysregulation, Sensorimotor Dysregulation  Psychiatric Impairment: Neurovegetative, Social Coherence, Signal-Noise Dysregulation  Medical Review of Systems Impairment: Autonomic Dysfunction  iADL Impairment: Candor Instrumental Activities of Daily Living Scale  The neurological examination is significant for:  Cerebellar Dysfunction: truncal ataxia (sitting, walking)  Cortical Frontal Dysfunction: non-fluent aphasia (fluency), agrammatic aphasia (syntax comprehension)  Cortical Frontal-Parietal Disconnection: apraxia (limb-motor)  Cortical Temporal Dysfunction: anomic aphasia (spontaneous, confrontational)  Cortical Temporal-Parietal Dysfunction: left-right confusion, dysgraphia  Cortical Transcallosal Disconnection: interhemispheric motor control (interhemispheric motor control ), motor efference (motor overflow)  Executive Impairment: thought disorder, dysexecutive behavior (perseverative/stereotyped)  Motor Coordination: gait imbalance (tiptoes)  Motor Dysfunction: muscle atrophy  Movement Disorder (Gait): strength (difficulty rising), abnormal features (abnormal posture, initiation/inhibition, stance, speed, stride/cycle, abnormal swing, difficulty turning), gait syndrome (rigid-akinetic)  Movement Disorder (Hyperkinetic): dyskinetic movements  Movement Disorder (Hypokinetic): parkinsonism (diminished facial expression, diminished facial expression, tone, bradykinesia), dyskinesia (slowing, hypometria, dysrhythmia)  Movement Disorder (Speech): abnormal vocal features (rate, articulation)  Sensory Dysfunction: hearing (diminished, hearing aides), peripheral (A? fibers)  Informal neuropsychology battery is positive (based on age and education)  for:  Executive predominant multidomain mild cognitive impairment  Moderate Memory Impairment: He scored >3 standard deviations below the norm on at least one measure. He had difficulty with encoding, attention, recall. He had a incomplete learning curve. His free recall was significantly impaired by time.  Moderate Executive Impairment: working memory, lexical fluency.  Mild Language Impairment: semantic association, Nonfluent, Semantics.  Very Mild Visuospatial Impairment: visuospatial construction.  MMSE 26/30: MMSE Score suggestive of normal to questionable cognitive impairment.  MOCA 24/30: MOCA Score suggestive of mild cognitive impairment.  BEHAV5+ 3/6: See ROS section for a full description  Neurological imaging  No imaging is available for secondary review        Assessment:        Mr. 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    Mr. Hickmans clinical presentation meets the criteria for Mild Cognitive Impairment (MCI-ADRC) (Gigi MS, et al. 2011 Alzheimer's & Dementia).     Concern regarding an intraindividual change in cognition  Impairment in one or more cognitive domains  Preservation of independence in functional abilities.  Not demented     Mr. Lopez's clinical presentation is suggestive of FTLD NOS as they have features of both Behavioral variant Frontotemporal Dementia (bvFTD) (Rascovsky et al., Brain 2011) and Semantic variant Primary Progressive Aphasia (svPPA) (Hilary CORLEY, et al. Neurology. 2011) without meeting criteria for either.      Early behavioral disinhibition: socially inappropriate behavior, loss of manners or decorum, impulsive, rash or careless actions  Early perseverative, stereotyped, or compulsive/ritualistic behavior: simple repetitive movements, complex, compulsive or ritualistic behaviors,  stereotypy of speech.  On neuropsychological testing: deficits in executive tasks with relative sparing of episodic memory and visuospatial skills.  Impaired confrontation naming.  Impaired object knowledge, particularly for low-frequency or low-familiarity items.  Spared repetition.  Spared speech production (grammar and motor speech).  Predominant medial prefrontal/anterior temporal lobe atrophy.     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  Given patient's advanced age, the pathology underlying Mr. 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    The observations made above were discussed with the patient and his family. The patient presents reporting a minimum 1-year history of idiopathic progressive multi-domain cognitive impairment; however, behavioral symptoms are likely worsening over the past five years without clear and demonstrable interference with activities of daily living. Initial symptoms recognized by his family were behavioral disturbances, specifically an increase in prior tendencies, including obsessive-compulsive, ritualistic, simple motor tics, perseverative behavior,  and mild amnesia. Over the last year, symptoms have progressed. The patient was recently evaluated by an outside hospital in [place]. Records are not available for review. The diagnosis was late-onset amnestic dementia. Neurocognitive assessment performed today shows executive predominant multi-domain mild cognitive impairment. Neurological examination is consistent with asymmetric right greater than left parkinsonism, gait instability, motor apraxia, dyskinetic movements, with mild semantic aphasia. Imaging provided from the outside hospital shows evidence of bilateral anterior temporal atrophy with relatively pronounced medial prefrontal  cortical atrophy with regional atrophy of the anterior cingulate bilateral with mild left greater than right cuneus atrophy.     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 The majority of the patient's most prominent symptoms are localized to the prefrontal/anterior temporal region. This correlates well with available brain imaging. Given the patient's advanced age, the most likely underlying pathology is mixed, including but not limited to Alzheimer's disease-related pathology and TDP43 proteinopathy but also probable alpha-synucleinopathy. Primary concerns at this time are falls. The patient has gait instability pronounced during evaluation. The patient nearly falls during simple walking. Recommend evaluation by neuro-physical therapy team with consideration for Parkinson's specific physical therapy. The patient has ongoing insomnia with CPAP, not interference. Will refer to Sleep Medicine to get a CPAP machine. Start Belsomra 10 mg for insomnia. Recommend EKG before starting acetylcholine esterase inhibitor.     Suvorexant (as well as other orexin inhibitors) is extremely effective, well-tolerated in those over 65 with minimal adverse reactions in clinical trials, and non-addictive. In clinical trials it was shown to improve total sleep time, sleep latency, waking after sleep onset, and quality of sleep, and the only adverse reaction that clearly  from placebo was morning grogginess (Suvorexant 7%, placebo 3%). If insurance requires a prior authorization, guidelines established by the American Academy of Sleep Medicine (AASM) should be sufficient justification for using Suvorexant. Per the AASM guidelines (Aroldo et al, J Clin Sleep Med, 2017;13(2):307-349), Suvorexant is the most reasonable choice for the treatment of the insomnia in those over 65, particularly in the setting of  cognitive impairment. Benzodiazepines (e. G. Triazolam, Temazepam), Z drugs (e. G. Zolpidem, Eszopiclone), and anticholinergic medications (e. G. Doxepin) are not recommended to treat insomnia in those over 65 and are listed on the AGS Beers Criteria (AGS Beers Criteria Update Expert Panel, J Am Geriatr Soc, 2019;67(4):674-694). These medications increase the risk for falls and worsen cognition. Other medications, including Zaleplon and Ramelteon are only indicated for sleep-onset insomnia. Trazodone is not recommended by AASM for the treatment of insomnia.     Care Management Plan:     #Diagnostic Workup:   Laboratories: Hcy, Free T4, TSH, B1, B9, B12, MMA, HIV Ab/Ag, RPR, D  Procedures: Electrocardiogram  #Neurocognitive Disorder Treatment:  Continue Razadyne 4 mg  D/C oxybutyrin, recommend switch to Mybetriq   #Behavioral Disorder Treatment:  No indication for memantine at this time  Continue Zoloft 100mg  #Insomnia Treatment:  We have made referrals to Sleep medicine for SXS suggest of CALVIN  We recommend a trial of Belsomra 10mg qHS  #Microvascular Disease Management:  Following fasting lipid profile results, we will consider adding pravastatin or rosuvastatin  We recommend switching patient from ongoing lipophilic statin therapy to hydrophilic statin therapy (Weston et al., 2015; Gates et al., 2018; Lawrence et al., 2021, Ajay et al., 2021).  #Behavioral/Environmental Treatment  We recommend engaging in activities that stimulate cognitively and socially while avoiding excessive stimulation and fatigue in overwhelmingly complex situations.  We recommend integrating routine and schedule into your daily life. https://www.alzheimersproject.org/news/the-importance-of-routine-and-familiarity-to-persons-with-dementia/  #Health Maintenance/Lifestyle Advice  We have discussed the value in aggressively controlling vascular risk factors like hypertension, hyperlipidemia, and Diabetes SBP<130, LDL<100, A1C<7.0.  We  "discussed the need to optimize lifestyle choices including a heart-healthy diet (e.g., Mediterranean or DASH), increased cardiovascular exercise (goal 150 minutes of moderate-intensity per week), and stay cognitively and socially active.  #Support  We all need support sometimes. Get easy access to local resources, community programs, and services. https://www.communityresourcefinder.org/  Learn more about Cognitive Impairment in [place]: https://www.alz.org/professionals/public-health/state-overview/[place]  #Safety  [place] has no laws against driving with dementia specifically but obviously has laws about medical conditions which impact a person's ability to drive safely. If you believe your loved one's driving capacity has diminished, please reach out to either your primary care physician or our office to discuss driving restrictions or revocation of their license. To learn more: https://www.dementiacarecentral.com/caregiverinfo/driving-problems/  The Alzheimer's Association administers the nationwide "Safe Return" program with identification bracelets, necklaces, or clothing tags and 24-hour assistance. More information is available online at https://www.alz.org/help-support/caregiving/safety/medicalert-with-24-7-wandering-support  #Follow up:  Follow-up in 4 weeks (Sep 2022).    Thank you for allowing us to participate in the care of your patient. Please do not hesitate to contact us with any questions or concerns.     It was a pleasure seeing Mr. Lopez and we look forward to seeing them at their follow-up visit.     This note is dictated on M*Modal Fluency Direct word recognition program. There are word recognition mistakes that are occasionally missed on review.      Scheduled Follow-up :  No future appointments.    After Visit Medication List :     Medication List            Accurate as of August 31, 2022 10:13 AM. If you have any questions, ask your nurse or doctor.                START taking these " medications      MYRBETRIQ 25 mg Tb24 ER tablet  Generic drug: mirabegron  Take 1 tablet (25 mg total) by mouth once daily.  Started by: Óscar Miller MD            CONTINUE taking these medications      atorvastatin 10 MG tablet  Commonly known as: LIPITOR  Take 4 tablets (40 mg total) by mouth once daily.     desonide 0.05 % cream  Commonly known as: DESOWEN     dextran 70-hypromellose ophthalmic solution  Commonly known as: TEARS  Place 1 drop into both eyes as needed.     galantamine 4 MG Tab  Commonly known as: RAZADYNE  Take 1 tablet (4 mg total) by mouth 2 (two) times daily with meals.     iron-vitamin C 100-250 mg (ICAR-C) 100-250 mg Tab     losartan 50 MG tablet  Commonly known as: COZAAR  Take 1 tablet (50 mg total) by mouth once daily.     metoprolol succinate 50 MG 24 hr tablet  Commonly known as: TOPROL-XL  Take 1 tablet (50 mg total) by mouth once daily.     montelukast 10 mg tablet  Commonly known as: SINGULAIR  Take 1 tablet (10 mg total) by mouth every evening.     multivitamin per tablet  Commonly known as: THERAGRAN     omeprazole 40 MG capsule  Commonly known as: PRILOSEC  Take 1 capsule (40 mg total) by mouth every morning.     oxybutynin 10 MG 24 hr tablet  Commonly known as: DITROPAN-XL  Take 1 tablet (10 mg total) by mouth once daily.     polyethylene glycol 17 gram Pwpk  Commonly known as: GLYCOLAX     PROCTOFOAM HC rectal foam  Generic drug: hydrocortisone-pramoxine  USE ONE APPLICATIORFUL RECTALLY TWICE DAILY     sertraline 100 MG tablet  Commonly known as: ZOLOFT  Take 1 tablet (100 mg total) by mouth once daily.     triamcinolone acetonide 0.025 % Lotn  Apply 1 application topically 2 (two) times daily.               Where to Get Your Medications        These medications were sent to [facility] [address] [place] [id number]      Phone: [phone]   MYRBETRIQ 25 mg Tb24 ER tablet         Signing Physician:   Óscar Miller MD    Billing:    -----------------------------------------------------------------------------    I spent a total of 95 minutes (time-in: 09:10 AM; time-out: 10:45 AM) on 08/31/2022, in-person face-to-face with the patient and caregiver(s), >50% of that time was spent counseling regarding the symptoms, treatment plan, risks, therapeutic options, lifestyle modifications, and/or safety issues for the diagnoses above.    10/14 Review of Systems completed and is negative except as stated above in HPI (Systems reviewed: Const, Eyes, ENT, Resp, CV, GI, , MSK, Skin, Neuro)    I reviewed previous labs for a total of 5 minutes on 08/31/2022. This is directly related to the face-to-face encounter. Review of previous labs was performed all negative except as stated above in HPI    I reviewed the summation of records from outside physicians for a total of 10 minutes on 08/31/2022. Reviewed and summation of records from an outside physician was performed as summarized above in HPI    I performed a neurobehavioral status examination that included a clinical assessment of thinking, reasoning, and judgment. Please see above HPI and ROS for full details. This exam was performed on 08/31/2022 and included 13 minutes spent on direct face-to-face clinical observation and interview with the patient and 20 minutes spent interpreting test results and preparing the report. The total time of 33 minutes spent on the neurobehavioral status examination is not included in the time spent on evaluation and management coding.    I performed a neuropsychological evaluation that included the application of a series of standardized neurocognitive tests. Please see the informal neuropsychological assessment above for full details. This evaluation was performed on 08/31/2022 and included 15 minutes spent on direct face-to-face clinical standardized test administration with the patient and 20 minutes spent on interpreting standardized  test results, integrating patient data into a treatment plan, and providing feedback to the patient and caregiver. The total time of 35 minutes spent on the neuropsychological evaluation is not included in the time spent on evaluation and management coding.    Total Billing time spent on encounter/documentation for this patient's evaluation and management, not including the neurobehavioral status examination and neuropsychological evaluation: 82 minutes.

## 2022-09-05 PROBLEM — N18.32 STAGE 3B CHRONIC KIDNEY DISEASE: Status: ACTIVE | Noted: 2022-09-05

## 2022-09-05 RX ORDER — LOSARTAN POTASSIUM 50 MG/1
50 TABLET ORAL DAILY
Qty: 90 TABLET | Refills: 3 | Status: SHIPPED | OUTPATIENT
Start: 2022-09-05 | End: 2022-09-12 | Stop reason: SDUPTHER

## 2022-09-05 RX ORDER — MULTIVITAMIN
1 TABLET ORAL DAILY
Qty: 90 TABLET | Refills: 3 | Status: SHIPPED | OUTPATIENT
Start: 2022-09-05 | End: 2022-09-12 | Stop reason: SDUPTHER

## 2022-09-05 RX ORDER — SERTRALINE HYDROCHLORIDE 100 MG/1
100 TABLET, FILM COATED ORAL DAILY
Qty: 90 TABLET | Refills: 0 | Status: SHIPPED | OUTPATIENT
Start: 2022-09-05 | End: 2022-09-12 | Stop reason: SDUPTHER

## 2022-09-05 RX ORDER — METOPROLOL SUCCINATE 50 MG/1
50 TABLET, EXTENDED RELEASE ORAL DAILY
Qty: 30 TABLET | Refills: 11 | Status: SHIPPED | OUTPATIENT
Start: 2022-09-05 | End: 2022-09-12 | Stop reason: SDUPTHER

## 2022-09-05 RX ORDER — IRON,CARBONYL/ASCORBIC ACID 100-250 MG
1 TABLET ORAL DAILY
Qty: 90 TABLET | Refills: 3 | Status: SHIPPED | OUTPATIENT
Start: 2022-09-05

## 2022-09-05 RX ORDER — GALANTAMINE 4 MG/1
4 TABLET, FILM COATED ORAL 2 TIMES DAILY WITH MEALS
Qty: 60 TABLET | Refills: 11 | Status: SHIPPED | OUTPATIENT
Start: 2022-09-05 | End: 2022-09-12 | Stop reason: SDUPTHER

## 2022-09-05 RX ORDER — MONTELUKAST SODIUM 10 MG/1
10 TABLET ORAL NIGHTLY
Qty: 30 TABLET | Refills: 0 | Status: SHIPPED | OUTPATIENT
Start: 2022-09-05 | End: 2022-09-12 | Stop reason: SDUPTHER

## 2022-09-05 RX ORDER — ATORVASTATIN CALCIUM 10 MG/1
40 TABLET, FILM COATED ORAL DAILY
Qty: 30 TABLET | Refills: 11 | Status: SHIPPED | OUTPATIENT
Start: 2022-09-05 | End: 2022-09-12 | Stop reason: SDUPTHER

## 2022-09-05 RX ORDER — OMEPRAZOLE 40 MG/1
40 CAPSULE, DELAYED RELEASE ORAL EVERY MORNING
Qty: 30 CAPSULE | Refills: 11 | Status: SHIPPED | OUTPATIENT
Start: 2022-09-05 | End: 2022-09-12 | Stop reason: SDUPTHER

## 2022-09-05 RX ORDER — POLYETHYLENE GLYCOL 3350 17 G/17G
17 POWDER, FOR SOLUTION ORAL DAILY
Qty: 30 PACKET | Refills: 11 | Status: SHIPPED | OUTPATIENT
Start: 2022-09-05

## 2022-09-05 RX ORDER — OXYBUTYNIN CHLORIDE 10 MG/1
10 TABLET, EXTENDED RELEASE ORAL DAILY
Qty: 30 TABLET | Refills: 11 | Status: SHIPPED | OUTPATIENT
Start: 2022-09-05 | End: 2022-09-12 | Stop reason: SDUPTHER

## 2022-09-05 NOTE — ASSESSMENT & PLAN NOTE
Appointment with Neuro on Au 31 rst to re-evaluate.  Only taking Zoloft 100 mg po daily at this time.

## 2022-09-09 ENCOUNTER — PATIENT MESSAGE (OUTPATIENT)
Dept: NEUROLOGY | Facility: CLINIC | Age: 87
End: 2022-09-09
Payer: MEDICARE

## 2022-09-12 ENCOUNTER — CLINICAL SUPPORT (OUTPATIENT)
Dept: PRIMARY CARE CLINIC | Facility: CLINIC | Age: 87
End: 2022-09-12
Payer: MEDICARE

## 2022-09-12 DIAGNOSIS — D50.8 OTHER IRON DEFICIENCY ANEMIA: ICD-10-CM

## 2022-09-12 DIAGNOSIS — Z85.46 HISTORY OF PROSTATE CANCER: ICD-10-CM

## 2022-09-12 DIAGNOSIS — J30.2 SEASONAL ALLERGIES: ICD-10-CM

## 2022-09-12 DIAGNOSIS — F02.80 ALZHEIMER'S DISEASE WITHOUT BEHAVIORAL DISTURBANCE: ICD-10-CM

## 2022-09-12 DIAGNOSIS — R32 URINARY INCONTINENCE, UNSPECIFIED TYPE: ICD-10-CM

## 2022-09-12 DIAGNOSIS — G47.01 INSOMNIA DUE TO MEDICAL CONDITION: ICD-10-CM

## 2022-09-12 DIAGNOSIS — F41.9 ANXIETY: ICD-10-CM

## 2022-09-12 DIAGNOSIS — E78.5 HYPERLIPIDEMIA, UNSPECIFIED HYPERLIPIDEMIA TYPE: ICD-10-CM

## 2022-09-12 DIAGNOSIS — G47.33 OSA (OBSTRUCTIVE SLEEP APNEA): ICD-10-CM

## 2022-09-12 DIAGNOSIS — K30 NUD (NONULCER DYSPEPSIA): ICD-10-CM

## 2022-09-12 DIAGNOSIS — I10 ESSENTIAL HYPERTENSION: ICD-10-CM

## 2022-09-12 DIAGNOSIS — G30.9 ALZHEIMER'S DISEASE WITHOUT BEHAVIORAL DISTURBANCE: ICD-10-CM

## 2022-09-12 PROCEDURE — 99499 UNLISTED E&M SERVICE: CPT | Mod: S$GLB,,, | Performed by: INTERNAL MEDICINE

## 2022-09-12 PROCEDURE — 99499 NO LOS: ICD-10-PCS | Mod: S$GLB,,, | Performed by: INTERNAL MEDICINE

## 2022-09-12 RX ORDER — METOPROLOL SUCCINATE 50 MG/1
50 TABLET, EXTENDED RELEASE ORAL DAILY
Qty: 30 TABLET | Refills: 11 | Status: SHIPPED | OUTPATIENT
Start: 2022-09-12 | End: 2023-05-16 | Stop reason: DRUGHIGH

## 2022-09-12 RX ORDER — GALANTAMINE 4 MG/1
4 TABLET, FILM COATED ORAL 2 TIMES DAILY WITH MEALS
Qty: 60 TABLET | Refills: 11 | Status: SHIPPED | OUTPATIENT
Start: 2022-09-12 | End: 2023-08-23 | Stop reason: SDUPTHER

## 2022-09-12 RX ORDER — ATORVASTATIN CALCIUM 40 MG/1
40 TABLET, FILM COATED ORAL DAILY
Qty: 30 TABLET | Refills: 11 | Status: SHIPPED | OUTPATIENT
Start: 2022-09-12 | End: 2023-09-21 | Stop reason: SDUPTHER

## 2022-09-12 RX ORDER — OXYBUTYNIN CHLORIDE 10 MG/1
10 TABLET, EXTENDED RELEASE ORAL DAILY
Qty: 30 TABLET | Refills: 11 | Status: SHIPPED | OUTPATIENT
Start: 2022-09-12 | End: 2022-09-27

## 2022-09-12 RX ORDER — MULTIVIT-MIN/FA/LYCOPEN/LUTEIN .4-300-25
1 TABLET ORAL DAILY
Qty: 90 TABLET | Refills: 3 | Status: SHIPPED | OUTPATIENT
Start: 2022-09-12 | End: 2023-08-23 | Stop reason: SDUPTHER

## 2022-09-12 RX ORDER — MIRABEGRON 25 MG/1
25 TABLET, FILM COATED, EXTENDED RELEASE ORAL DAILY
Qty: 30 TABLET | Refills: 1 | Status: SHIPPED | OUTPATIENT
Start: 2022-09-12 | End: 2022-09-27 | Stop reason: SDUPTHER

## 2022-09-12 RX ORDER — LOSARTAN POTASSIUM 50 MG/1
50 TABLET ORAL DAILY
Qty: 90 TABLET | Refills: 3 | Status: SHIPPED | OUTPATIENT
Start: 2022-09-12 | End: 2023-09-21 | Stop reason: SDUPTHER

## 2022-09-12 RX ORDER — MONTELUKAST SODIUM 10 MG/1
10 TABLET ORAL NIGHTLY
Qty: 30 TABLET | Refills: 0 | Status: SHIPPED | OUTPATIENT
Start: 2022-09-12 | End: 2022-11-17 | Stop reason: SDUPTHER

## 2022-09-12 RX ORDER — OMEPRAZOLE 40 MG/1
40 CAPSULE, DELAYED RELEASE ORAL EVERY MORNING
Qty: 30 CAPSULE | Refills: 11 | Status: SHIPPED | OUTPATIENT
Start: 2022-09-12 | End: 2023-08-23 | Stop reason: SDUPTHER

## 2022-09-12 RX ORDER — SERTRALINE HYDROCHLORIDE 100 MG/1
100 TABLET, FILM COATED ORAL DAILY
Qty: 90 TABLET | Refills: 0 | Status: SHIPPED | OUTPATIENT
Start: 2022-09-12 | End: 2023-01-11 | Stop reason: SDUPTHER

## 2022-09-12 RX ORDER — SUVOREXANT 10 MG/1
1 TABLET, FILM COATED ORAL NIGHTLY
Qty: 30 TABLET | Refills: 3 | Status: SHIPPED | OUTPATIENT
Start: 2022-09-12 | End: 2022-09-27 | Stop reason: SDUPTHER

## 2022-09-12 NOTE — PROGRESS NOTES
The patient is here today for pill packing and b/p check only.    Dr. Elle Sharma, DNP, MSN, CHFN, ACNP, APRN, B.C.  [facility]  [facility]  Spectra [phone]

## 2022-09-16 ENCOUNTER — CLINICAL SUPPORT (OUTPATIENT)
Dept: REHABILITATION | Facility: HOSPITAL | Age: 87
End: 2022-09-16
Attending: PSYCHIATRY & NEUROLOGY
Payer: MEDICARE

## 2022-09-16 DIAGNOSIS — Z74.09 IMPAIRED FUNCTIONAL MOBILITY, BALANCE, GAIT, AND ENDURANCE: ICD-10-CM

## 2022-09-16 PROCEDURE — 97162 PT EVAL MOD COMPLEX 30 MIN: CPT | Mod: PO

## 2022-09-16 NOTE — PROGRESS NOTES
[facility]  Physical Therapy Neurological Rehabilitation Initial Evaluation    Name: Hu Lopez  Clinic Number: [id number]    Therapy Diagnosis:   Encounter Diagnosis   Name Primary?    Impaired functional mobility, balance, gait, and endurance      Physician: Óscar Miller MD    Physician Orders: PT Eval and Treat   Medical Diagnosis from Referral:   G20 (ICD-10-CM) - Parkinsonism, unspecified Parkinsonism type   G31.09,F02.80 (ICD-10-CM) - Frontotemporal lobar degeneration     Evaluation Date: 9/16/2022  Authorization Period Expiration: 08/31/22 to 08/31/23  Plan of Care Expiration: 11/11/22  Visit # / Visits authorized: 01/ 01    Time In: 15:50  Time Out: 16:40  Total Billable Time: 50 minutes    Precautions: Standard, h/o emotional deficits, h/o psych deficits, h/o dementia, cancer, safety awareness deficits    Subjective   Daughter danii present to assist with history.    Date of onset: gradual decline in overall mobility over past 2-3 years  History of current condition - Hu's daughter reports: that since separation due to COVID, neurologic decline most noted. Neurologist noted at last visit, that patient is at an elevated fall risk and would like to try to maintain quality of life and prevent injury from fall. Endorses that he gets dizziness when he gets up quickly. She endorses that he has memory decline, anxiety, poor vision, and decreased hearing. Most recent fall was within the last 6 months when her fell in the kitchen at the assisted living facility that he was living at in [place].      Medical History:   Past Medical History:   Diagnosis Date    Allergy     Basal cell carcinoma     BPH (benign prostatic hyperplasia)     CAD (coronary artery disease)     Cataracts, bilateral     Diverticulitis     DVT (deep venous thrombosis)     Elevated PSA     GERD (gastroesophageal reflux disease)     Glaucoma     Hyperlipidemia     Hypertension     Prostate cancer     Skin cancer  "    Sleep apnea     CPAP       Surgical History:   Hu Lopez  has a past surgical history that includes Colon surgery; Appendectomy; Colostomy; Revision Colostomy; Colonoscopy; Upper gastrointestinal endoscopy; Skin cancer excision; and Prostate surgery (2008).    Medications:   Hu has a current medication list which includes the following prescription(s): atorvastatin, desonide, dextran 70-hypromellose, galantamine, iron-vitamin c 100-250 mg (icar-c), losartan, metoprolol succinate, myrbetriq, montelukast, cerovite senior, omeprazole, oxybutynin, polyethylene glycol, proctofoam hc, sertraline, belsomra, triamcinolone acetonide, and [DISCONTINUED] multivitamin.    Allergies:   Review of patient's allergies indicates:   Allergen Reactions    Codeine Nausea And Vomiting    Simcor [niacin-simvastatin] Rash    Simvastatin Rash        Imaging: MRI of brain ordered but results not available in EPIC    Prior Therapy: none recently  Social History: lives at [facility] with his wife  Falls: 1 recent fall ~6 months ago in kitchen of assisted living facility in [place]  DME: walk- in shower with shower curtain, built in bench present, but patient does not use  Home Environment: [facility]   Exercise Routine / History: none currently; spends most of the day watching TV, reading   Family Present at time of Eval: daughter Ekaterina present to provide history, provided transportation for patient   Occupation: not currently working  Prior Level of Function: (I) to Mod I for functional mobility/ADL; goes down to dining room for meals-does not cook; does not drive- family provides transportation  Current Level of Function: (I) to Mod I for functional mobility/ADL; goes down to dining room for meals-does not cook; does not drive- family provides transportation    Pain:  No pain at this time.     Patient's goals: "To prevent injury from fall." Daughter would like see improved control when going down steps- does not appear to " "have the appropriate leg strength to control stair descent."    Objective     Mental status: alert, oriented to person, place, and time; decreased safety awareness at times  Appearance: Casually dressed  Behavior:  cooperative  Attention Span and Concentration:  Grossly intact    Dominant hand:  right     Posture Alignment in sitting/standing:   Head: forward head   Scapulae: rounded shoulders   Trunk: slouched posture   Pelvis: posterior pelvic tilt   Legs: WFL     Sensation: Light Touch: Intact         Tone: gross BLE WFL    Visual/Auditory: blind in left eye; 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 and gets injections every 3 months to preserve vision that he has regained from recent cataract surgery  Tracking:disconjugate eye movement throughout due to PMH of eye injury  Saccades: NT  Acuity:impaired vision bilaterally  R/L discrimination: Intact  Visual field: Impaired: see above    Coordination: NT this date       RANGE OF MOTION--LOWER EXTREMITIES  (R) LE Hip: normal   Knee: normal   Ankle: normal    (L) LE: Hip: normal   Knee: normal   Ankle: normal    Strength: manual muscle test grades below   Lower Extremity Strength- performed with patient in sitting  Right LE  Left LE    Hip Flexion: 4-/5 Hip Flexion: 4-/5   Hip Extension:  4/5 Hip Extension: 4/5   Hip Abduction: 5/5 Hip Abduction: 5/5   Hip Adduction: 5/5 Hip Adduction 5/5   Knee Extension: 5/5 Knee Extension: 5/5   Knee Flexion: 4+/5 Knee Flexion: 5/5   Ankle Dorsiflexion: 5/5 Ankle Dorsiflexion: 5/5   Ankle Plantarflexion: 4/5 Ankle Plantarflexion: 4+/5     Abdominal Strength: at least 3+/5    Flexibility: mild hamstring tightness, bilaterally       Evaluation   30 second Chair Rise  (adults > 59 y/o) 10 completed with arms on knees-endorses some fatigue in legs     Postural control:  MCTSIB:  1. Eyes Open/feet together/Firm: 30 seconds  2. Eyes Closed/feet together/Firm: 30 seconds  3. Eyes Open/feet together/Foam: 30 seconds  4. Eyes " Closed/feet together/Foam: 30 seconds- min sway    Gait Assessment:   - AD used: none  - Assistance: Mod I due to safety awareness deficits  - Distance: community distances    GAIT DEVIATIONS:  Hu displays the following deviations with ambulation: reduced reciprocal arm swing, slight increase in base of support width, increased time in double limb support    Impairments contributing to deviations: decreased balance, decreased safety awareness    Endurance Deficit: moderate      Evaluation   Timed Up and Go 14 sec, no upper extremity support  < 20 sec safe for independent transfers, < 30 sec safe for dependent transfers/assist required   Self Selected Walking Speed 1.0 m/sec (6m/6s)   Fast Walking Speed 1.2 m/sec (6m/5s)       Functional Gait Assessment:   1. Gait on level surface =  3  2. Change in Gait Speed = 3  3. Gait with horizontal head turns  = 2  4. Gait with vertical head turns = 2  5. Gait with pivot turns = 2  6. Step over obstacle = 2  7. Gait with Narrow NACHO = 0  8. Gait with eyes closed = 1  9. Ambulating Backwards = 2  10. Steps = 2     Score 19/30   Score:   <22/30 fall risk   <20/30 fall risk in older adults   <18/30 fall risk in Parkinsons     CMS Impairment/Limitation/Restriction for FOTO for Degenerative CNS Disorders Survey    Therapist reviewed FOTO scores for Hu Lopez on 9/16/2022.   FOTO documents entered into [employer] - see Media section.    Limitation Score: 41%         TREATMENT   No treatment provided this date. Entire time spent on evaluation.     Home Exercises and Patient Education Provided    Education provided:   - plan of care (POC), scheduling    Written Home Exercises Provided: to be provided at first follow up session    Assessment   Hu is a 86 y.o. male referred to outpatient Physical Therapy with a medical diagnosis of Parkinsonism, unspecified Parkinsonism type; Frontotemporal lobar degeneration. Patient presents with reports of decreased balance and ambulation over  past 2 years with patient demonstrating tendency for elevated fall risk over past 6 months. PMH significant for vision impairment, hearing impairment, and cognitive deficits. During lower extremity strength testing, mild impairment mainly noted with bilateral hip flexion and bilateral hip extension. Good baseline performance noted on chair rise test, but patient endorses mild fatigue post trial. Performance on Timed Up and Go, self selected walking speed (SSWS), and FSWS tests WFL. Current performance on self selected walking speed (SSWS) places patient in community ambulator with increased time needs category. During MCTSIB, vision eliminated stance on foam remains most challenging condition. Current Functional Gait Assessment performance does place patient in elevated fall risk category at this time. Patient to benefit from continued PT intervention to further address mobility deficits listed above to reduce risk of fall and to improve safety with functional mobility.     Patient prognosis is Fair.   Patient will benefit from skilled outpatient Physical Therapy to address the deficits stated above and in the chart below, provide patient/family education, and to maximize patient's level of independence.     Plan of care discussed with patient: Yes  Patient's spiritual, cultural and educational needs considered and patient is agreeable to the plan of care and goals as stated below:     Anticipated Barriers for therapy: co-morbidities    Medical Necessity is demonstrated by the following  History  Co-morbidities and personal factors that may impact the plan of care Co-morbidities:   Alzheimer's disease, PARVEEN, essential hypertension, arteriosclerosis, overactive bladder, nocturia, stage 3 CKD, h/o prostate cancer, CPAP, cancer, prior knee injury    Personal Factors:   age     high   Examination  Body Structures and Functions, activity limitations and participation restrictions that may impact the plan of care Body  Regions:   lower extremities  trunk    Body Systems:    gross symmetry  ROM  strength  gross coordinated movement  balance  gait  transfers  transitions  motor control  motor learning    Participation Restrictions:   Transportation assist required    Activity limitations:   Learning and applying knowledge  solving problems    General Tasks and Commands  undertaking multiple tasks    Communication  no deficits    Mobility  walking  driving (bike, car, motorcycle)    Self care  looking after one's health    Domestic Life  shopping  cooking  doing house work (cleaning house, washing dishes, laundry)  assisting others    Interactions/Relationships  no deficits    Life Areas  no deficits    Community and Social Life  community life  recreation and leisure         moderate   Clinical Presentation evolving clinical presentation with changing clinical characteristics- recent fall moderate   Decision Making/ Complexity Score: moderate     Goals:  Short Term Goals: 4 weeks   Patient/caregivers to be (I) with established home exercise program.   Patient to improve bilateral hip flexion strength scores to at least 4/5 for improved gait mechanics.   Patient to perform at least 10 reps during chair rise test with no additional fatigue for improved endurance with functional mobility.   Patient to improve Functional Gait Assessment score to at least 22/30 for improved balance with community mobility.     Long Term Goals: 8 weeks   Patient/caregivers to be (I) with advanced home exercise program.   Patient to improve bilateral hip flexion and extension strength scores to at least 4+/5 for improved gait mechanics.   Patient to pass MCTSIB condition 4 with no more than slight sway for improved balance with low vision environments.   Patient to improve Functional Gait Assessment score to at least 25/30 for improved balance with community mobility.     Plan   Plan of care Certification: 9/16/2022 to 11/11/22.    Outpatient Physical  Therapy 2 times weekly for 8 weeks to include the following interventions: Gait Training, Manual Therapy, Moist Heat/ Ice, Neuromuscular Re-ed, Orthotic Management and Training, Patient Education, Self Care, Therapeutic Activities, and Therapeutic Exercise.     Carrie Khanna, PT

## 2022-09-16 NOTE — PLAN OF CARE
[facility]  Physical Therapy Neurological Rehabilitation Initial Evaluation    Name: Hu Lopez  Clinic Number: [id number]    Therapy Diagnosis:   Encounter Diagnosis   Name Primary?    Impaired functional mobility, balance, gait, and endurance      Physician: Óscar Miller MD    Physician Orders: PT Eval and Treat   Medical Diagnosis from Referral:   G20 (ICD-10-CM) - Parkinsonism, unspecified Parkinsonism type   G31.09,F02.80 (ICD-10-CM) - Frontotemporal lobar degeneration     Evaluation Date: 9/16/2022  Authorization Period Expiration: 08/31/22 to 08/31/23  Plan of Care Expiration: 11/11/22  Visit # / Visits authorized: 01/ 01    Time In: 15:50  Time Out: 16:40  Total Billable Time: 50 minutes    Precautions: Standard, h/o emotional deficits, h/o psych deficits, h/o dementia, cancer, safety awareness deficits    Subjective   Daughter danii present to assist with history.    Date of onset: gradual decline in overall mobility over past 2-3 years  History of current condition - Hu's daughter reports: that since separation due to COVID, neurologic decline most noted. Neurologist noted at last visit, that patient is at an elevated fall risk and would like to try to maintain quality of life and prevent injury from fall. Endorses that he gets dizziness when he gets up quickly. She endorses that he has memory decline, anxiety, poor vision, and decreased hearing. Most recent fall was within the last 6 months when her fell in the kitchen at the assisted living facility that he was living at in [place].      Medical History:   Past Medical History:   Diagnosis Date    Allergy     Basal cell carcinoma     BPH (benign prostatic hyperplasia)     CAD (coronary artery disease)     Cataracts, bilateral     Diverticulitis     DVT (deep venous thrombosis)     Elevated PSA     GERD (gastroesophageal reflux disease)     Glaucoma     Hyperlipidemia     Hypertension     Prostate cancer     Skin cancer  "    Sleep apnea     CPAP       Surgical History:   Hu Lopez  has a past surgical history that includes Colon surgery; Appendectomy; Colostomy; Revision Colostomy; Colonoscopy; Upper gastrointestinal endoscopy; Skin cancer excision; and Prostate surgery (2008).    Medications:   Hu has a current medication list which includes the following prescription(s): atorvastatin, desonide, dextran 70-hypromellose, galantamine, iron-vitamin c 100-250 mg (icar-c), losartan, metoprolol succinate, myrbetriq, montelukast, cerovite senior, omeprazole, oxybutynin, polyethylene glycol, proctofoam hc, sertraline, belsomra, triamcinolone acetonide, and [DISCONTINUED] multivitamin.    Allergies:   Review of patient's allergies indicates:   Allergen Reactions    Codeine Nausea And Vomiting    Simcor [niacin-simvastatin] Rash    Simvastatin Rash        Imaging: MRI of brain ordered but results not available in EPIC    Prior Therapy: none recently  Social History: lives at [facility] with his wife  Falls: 1 recent fall ~6 months ago in kitchen of assisted living facility in [place]  DME: walk- in shower with shower curtain, built in bench present, but patient does not use  Home Environment: [facility]   Exercise Routine / History: none currently; spends most of the day watching TV, reading   Family Present at time of Eval: daughter Ekaterina present to provide history, provided transportation for patient   Occupation: not currently working  Prior Level of Function: (I) to Mod I for functional mobility/ADL; goes down to dining room for meals-does not cook; does not drive- family provides transportation  Current Level of Function: (I) to Mod I for functional mobility/ADL; goes down to dining room for meals-does not cook; does not drive- family provides transportation    Pain:  No pain at this time.     Patient's goals: "To prevent injury from fall." Daughter would like see improved control when going down steps- does not appear to " "have the appropriate leg strength to control stair descent."    Objective     Mental status: alert, oriented to person, place, and time; decreased safety awareness at times  Appearance: Casually dressed  Behavior:  cooperative  Attention Span and Concentration:  Grossly intact    Dominant hand:  right     Posture Alignment in sitting/standing:   Head: forward head   Scapulae: rounded shoulders   Trunk: slouched posture   Pelvis: posterior pelvic tilt   Legs: WFL     Sensation: Light Touch: Intact         Tone: gross BLE WFL    Visual/Auditory: blind in left eye; 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 and gets injections every 3 months to preserve vision that he has regained from recent cataract surgery  Tracking:disconjugate eye movement throughout due to PMH of eye injury  Saccades: NT  Acuity:impaired vision bilaterally  R/L discrimination: Intact  Visual field: Impaired: see above    Coordination: NT this date       RANGE OF MOTION--LOWER EXTREMITIES  (R) LE Hip: normal   Knee: normal   Ankle: normal    (L) LE: Hip: normal   Knee: normal   Ankle: normal    Strength: manual muscle test grades below   Lower Extremity Strength- performed with patient in sitting  Right LE  Left LE    Hip Flexion: 4-/5 Hip Flexion: 4-/5   Hip Extension:  4/5 Hip Extension: 4/5   Hip Abduction: 5/5 Hip Abduction: 5/5   Hip Adduction: 5/5 Hip Adduction 5/5   Knee Extension: 5/5 Knee Extension: 5/5   Knee Flexion: 4+/5 Knee Flexion: 5/5   Ankle Dorsiflexion: 5/5 Ankle Dorsiflexion: 5/5   Ankle Plantarflexion: 4/5 Ankle Plantarflexion: 4+/5     Abdominal Strength: at least 3+/5    Flexibility: mild hamstring tightness, bilaterally       Evaluation   30 second Chair Rise  (adults > 61 y/o) 10 completed with arms on knees-endorses some fatigue in legs     Postural control:  MCTSIB:  1. Eyes Open/feet together/Firm: 30 seconds  2. Eyes Closed/feet together/Firm: 30 seconds  3. Eyes Open/feet together/Foam: 30 seconds  4. Eyes " Closed/feet together/Foam: 30 seconds- min sway    Gait Assessment:   - AD used: none  - Assistance: Mod I due to safety awareness deficits  - Distance: community distances    GAIT DEVIATIONS:  Hu displays the following deviations with ambulation: reduced reciprocal arm swing, slight increase in base of support width, increased time in double limb support    Impairments contributing to deviations: decreased balance, decreased safety awareness    Endurance Deficit: moderate      Evaluation   Timed Up and Go 14 sec, no upper extremity support  < 20 sec safe for independent transfers, < 30 sec safe for dependent transfers/assist required   Self Selected Walking Speed 1.0 m/sec (6m/6s)   Fast Walking Speed 1.2 m/sec (6m/5s)       Functional Gait Assessment:   1. Gait on level surface =  3  2. Change in Gait Speed = 3  3. Gait with horizontal head turns  = 2  4. Gait with vertical head turns = 2  5. Gait with pivot turns = 2  6. Step over obstacle = 2  7. Gait with Narrow NACHO = 0  8. Gait with eyes closed = 1  9. Ambulating Backwards = 2  10. Steps = 2     Score 19/30   Score:   <22/30 fall risk   <20/30 fall risk in older adults   <18/30 fall risk in Parkinsons     CMS Impairment/Limitation/Restriction for FOTO for Degenerative CNS Disorders Survey    Therapist reviewed FOTO scores for Hu Lopez on 9/16/2022.   FOTO documents entered into [employer] - see Media section.    Limitation Score: 41%         TREATMENT   No treatment provided this date. Entire time spent on evaluation.     Home Exercises and Patient Education Provided    Education provided:   - plan of care (POC), scheduling    Written Home Exercises Provided: to be provided at first follow up session    Assessment   Hu is a 86 y.o. male referred to outpatient Physical Therapy with a medical diagnosis of Parkinsonism, unspecified Parkinsonism type; Frontotemporal lobar degeneration. Patient presents with reports of decreased balance and ambulation over  past 2 years with patient demonstrating tendency for elevated fall risk over past 6 months. PMH significant for vision impairment, hearing impairment, and cognitive deficits. During lower extremity strength testing, mild impairment mainly noted with bilateral hip flexion and bilateral hip extension. Good baseline performance noted on chair rise test, but patient endorses mild fatigue post trial. Performance on Timed Up and Go, self selected walking speed (SSWS), and FSWS tests WFL. Current performance on self selected walking speed (SSWS) places patient in community ambulator with increased time needs category. During MCTSIB, vision eliminated stance on foam remains most challenging condition. Current Functional Gait Assessment performance does place patient in elevated fall risk category at this time. Patient to benefit from continued PT intervention to further address mobility deficits listed above to reduce risk of fall and to improve safety with functional mobility.     Patient prognosis is Fair.   Patient will benefit from skilled outpatient Physical Therapy to address the deficits stated above and in the chart below, provide patient/family education, and to maximize patient's level of independence.     Plan of care discussed with patient: Yes  Patient's spiritual, cultural and educational needs considered and patient is agreeable to the plan of care and goals as stated below:     Anticipated Barriers for therapy: co-morbidities    Medical Necessity is demonstrated by the following  History  Co-morbidities and personal factors that may impact the plan of care Co-morbidities:   Alzheimer's disease, PARVEEN, essential hypertension, arteriosclerosis, overactive bladder, nocturia, stage 3 CKD, h/o prostate cancer, CPAP, cancer, prior knee injury    Personal Factors:   age     high   Examination  Body Structures and Functions, activity limitations and participation restrictions that may impact the plan of care Body  Regions:   lower extremities  trunk    Body Systems:    gross symmetry  ROM  strength  gross coordinated movement  balance  gait  transfers  transitions  motor control  motor learning    Participation Restrictions:   Transportation assist required    Activity limitations:   Learning and applying knowledge  solving problems    General Tasks and Commands  undertaking multiple tasks    Communication  no deficits    Mobility  walking  driving (bike, car, motorcycle)    Self care  looking after one's health    Domestic Life  shopping  cooking  doing house work (cleaning house, washing dishes, laundry)  assisting others    Interactions/Relationships  no deficits    Life Areas  no deficits    Community and Social Life  community life  recreation and leisure         moderate   Clinical Presentation evolving clinical presentation with changing clinical characteristics- recent fall moderate   Decision Making/ Complexity Score: moderate     Goals:  Short Term Goals: 4 weeks   Patient/caregivers to be (I) with established home exercise program.   Patient to improve bilateral hip flexion strength scores to at least 4/5 for improved gait mechanics.   Patient to perform at least 10 reps during chair rise test with no additional fatigue for improved endurance with functional mobility.   Patient to improve Functional Gait Assessment score to at least 22/30 for improved balance with community mobility.     Long Term Goals: 8 weeks   Patient/caregivers to be (I) with advanced home exercise program.   Patient to improve bilateral hip flexion and extension strength scores to at least 4+/5 for improved gait mechanics.   Patient to pass MCTSIB condition 4 with no more than slight sway for improved balance with low vision environments.   Patient to improve Functional Gait Assessment score to at least 25/30 for improved balance with community mobility.     Plan   Plan of care Certification: 9/16/2022 to 11/11/22.    Outpatient Physical  Therapy 2 times weekly for 8 weeks to include the following interventions: Gait Training, Manual Therapy, Moist Heat/ Ice, Neuromuscular Re-ed, Orthotic Management and Training, Patient Education, Self Care, Therapeutic Activities, and Therapeutic Exercise.     Carrie Khanna, PT

## 2022-09-19 ENCOUNTER — PATIENT MESSAGE (OUTPATIENT)
Dept: PHARMACY | Facility: CLINIC | Age: 87
End: 2022-09-19
Payer: MEDICARE

## 2022-09-19 ENCOUNTER — TELEPHONE (OUTPATIENT)
Dept: NEUROLOGY | Facility: CLINIC | Age: 87
End: 2022-09-19
Payer: MEDICARE

## 2022-09-19 NOTE — TELEPHONE ENCOUNTER
----- Message from Óscar Miller MD sent at 9/18/2022  3:09 PM CDT -----  Need PAs for Belsomra and Myrbetriq - cost is to much per daughter. Please inform family we are in the process of getting authorization. If unable to get PA, will attempt [employer].  Óscar

## 2022-09-19 NOTE — PROGRESS NOTES
Adherence packed for 28 days using Dispill:  (Compressed monthly packing)      Morning pockets:  Cerovite Multi-vitamin  Galantamine 4 mg  Losartan 50 mg  Metoprolol succinate ER 50 mg  Omeprazole 40 mg  Sertraline 100 mg  Vitamin B12 1,000 mcg  Vitamin D3 5,000 IU      Evening pockets:  Atorvastatin 40 mg  Galantamine 4 mg  Montelukast 10 mg  Oxybutynin 10 mg

## 2022-09-21 ENCOUNTER — CLINICAL SUPPORT (OUTPATIENT)
Dept: REHABILITATION | Facility: HOSPITAL | Age: 87
End: 2022-09-21
Attending: PSYCHIATRY & NEUROLOGY
Payer: MEDICARE

## 2022-09-21 DIAGNOSIS — F02.80 FRONTOTEMPORAL LOBAR DEGENERATION: ICD-10-CM

## 2022-09-21 DIAGNOSIS — G31.09 FRONTOTEMPORAL LOBAR DEGENERATION: ICD-10-CM

## 2022-09-21 DIAGNOSIS — G20.C PARKINSONISM, UNSPECIFIED PARKINSONISM TYPE: ICD-10-CM

## 2022-09-21 DIAGNOSIS — R68.89 ALTERATION IN SELF-CARE ABILITY: ICD-10-CM

## 2022-09-21 PROCEDURE — 97165 OT EVAL LOW COMPLEX 30 MIN: CPT | Mod: PO

## 2022-09-21 PROCEDURE — 97530 THERAPEUTIC ACTIVITIES: CPT | Mod: PO

## 2022-09-22 NOTE — PLAN OF CARE
[facility] Occupational Therapy  Initial Neurological Evaluation     Date: 9/21/2022  Patient: Hu Lopez  Chart Number: [id number]    Therapy Diagnosis:   Encounter Diagnoses   Name Primary?    Parkinsonism, unspecified Parkinsonism type     Frontotemporal lobar degeneration     Alteration in self-care ability      Physician: Óscar Miller MD    Physician Orders: Ot Eval and treat  Medical Diagnosis:  G20 (ICD-10-CM) - Parkinsonism, unspecified Parkinsonism type   G31.09,F02.80 (ICD-10-CM) - Frontotemporal lobar degeneration     Evaluation Date: 9/21/2022  Plan of Care Expiration Period: 10/31/22  Insurance Authorization period Expiration: 8/31/22  Date of Return to MD: LUZ  Visit # / Visits Authorized: 1 / 1  FOTO: n/a    Time In:5:05pm  Time Out: 6:00pm  Total Billable (one on one) Time: 55 minutes    Precautions: Standard, Fall, and cognition     Subjective     History of Current Condition: Pt. Arrives with daughter Rosalinda (danii) and wife. Daughter requesting help for executive functioning. Daughter reports Hx of OCD that has worsened recently. Also fearful of falling. Has significant visual problems and limited hearing. Meal services, laundry services, light cleaining in room, and making bed are all taken care of at current living facility.     Involved Side: n/a  Dominant Side: Right  Date of Onset: January of 2021 started to notice a decline  Surgical Procedure: see chart hx  Imaging: see chart hx  Previous Therapy: none    Patient's Goals for Therapy: Pt. Wanting to reduce dizziness and falls (addressed by PT), family concerned about him wearing clothes over and over and safe activities as disease progresses     Pain:  Pain Related Behaviors Observed: no   Functional Pain Scale Rating 0-10:   4/10 on average    Location: leg pain   Description: Aching  Aggravating Factors: Walking  Easing Factors: rest    Occupation:  engineering   Working presently: Retired  Duties: simple home care  tasks    Functional Limitations/Social History:    Prior Level of Function: Mod I  Current Level of Function:see chart below    Home/Living environment : lives with their spouse  Home Access: labeth house but may not be for long due to cost  DME: none     Leisure: relaxing, watch the news    Driving: not anymore    Past Medical History/Physical Systems Review:     Past Medical History:  Hu Lopez  has a past medical history of Allergy, Basal cell carcinoma, BPH (benign prostatic hyperplasia), CAD (coronary artery disease), Cataracts, bilateral, Diverticulitis, DVT (deep venous thrombosis), Elevated PSA, GERD (gastroesophageal reflux disease), Glaucoma, Hyperlipidemia, Hypertension, Prostate cancer, Skin cancer, and Sleep apnea.    Past Surgical History:  Hu Lopez  has a past surgical history that includes Colon surgery; Appendectomy; Colostomy; Revision Colostomy; Colonoscopy; Upper gastrointestinal endoscopy; Skin cancer excision; and Prostate surgery (2008).    Current Medications:  Hu has a current medication list which includes the following prescription(s): atorvastatin, desonide, dextran 70-hypromellose, galantamine, iron-vitamin c 100-250 mg (icar-c), losartan, metoprolol succinate, myrbetriq, montelukast, cerovite senior, omeprazole, oxybutynin, polyethylene glycol, proctofoam hc, sertraline, belsomra, triamcinolone acetonide, and [DISCONTINUED] multivitamin.    Allergies:  Review of patient's allergies indicates:   Allergen Reactions    Codeine Nausea And Vomiting    Simcor [niacin-simvastatin] Rash    Simvastatin Rash        Other: n/a    Objective     Cognitive Exam:  Oriented: Person, Place, Time, and Situation  Behaviors: normal, cooperative  Follows Commands/attention: Follows two-step commands  Communication: clear/fluent  Memory: not formally assessed in clinic, saw formal testing recently  Safety awareness/insight to disability: aware of diagnosis, treatment, and prognosis  somewhat  Coping skills/emotional control: Appropriate to situation    Visual/Perceptual:  Not formally assessed, 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 Or Family's concern with current diagnosis     Fist: normal      Gross motor coordination:   MIKEL (Rapid Alternating Movements): WFL  Finger to Nose (5 times): WFL  Finger Flicks (coordination moving from digit flexion to digit extension): WFL      Sensation:  Hu  reports normal sensation      Balance:   Static Sit - NORMAL: No deviations seen in posture held statically  Dynamic sit- NORMAL: No deviations seen in posture held statically  Static Stand - See PT note  Dynamic stand - See PT note    Endurance Deficit: mild                    Functional Status      Functional Mobility:  Bed mobility: Mod I  Roll to left: Mod I  Roll to right: Mod I  Supine to sit: Mod I  Sit to supine: Mod I  Transfers to bed: I  Transfers to toilet: Mod I  Car transfers: Mod I  Wheelchair mobility: N/a    ADL's:  Feeding: Mod I, going downstairs to eat  Grooming: Max A but it carreon sbeen something his wife has always done for him  Hygiene: Mod I  UB Dressing: Mod I  LB Dressing: Mod I  Toileting: Mod I  Bathing: Mod I    IADL's:  Homecare: D  Cooking: D, but is making coffee regularly   Laundry: D  Yard work: N/A  Use of telephone: Mod I  Money management: Max A  Medication management: Mod I after daughter set up daily pill bags with the pharmacy   Handwriting:Mod I  Technology Use:Mod I, able to manipulate iPhone, listen to TV, manage hearing aids that require recharging     Comments:      CMS Impairment/Limitation/Restriction for FOTO Neuro Survey    FOTO deferred, progressive disease           Treatment     Treatment Time In: 5:45pm  Treatment Time Out: 6:00pm  Total Treatment time separate from Evaluation time:15    Hu participated in dynamic functional therapeutic activities to improve functional performance for 15  minutes, including:  - Reviewed strategies for maintaining and maximizing safe ADL/IADLs  - Reviewed with Daughter need for  services to help with financial/property discussions to prevent increased Pt. Agitation with family members  - Medical Alert ID on iPhone started         Home Exercises and Patient Education Provided    Education provided:   -role of OT, goals for OT, scheduling/cancellations, insurance limitations with patient.  -Additional Education provided: Dementia handout given to daughter, reviewed potential need for medical ID bracelets         Assessment     Hu Lopez is a 86 y.o. male referred to outpatient occupational therapy and presents with a medical diagnosis of Frontotemporal lobar degeneration, resulting in impaired function and demonstrates limitations as described in the chart below. Following medical record review it is determined that patient will benefit from occupational therapy services in order to maximize pain free and/or functional use of himself.    Currently, Hu is managing his ADLs without difficulty and his IADLs are currently being managed by his living facility. Daughter is concerned about his executive functioning and rational decision making. He is often wearing clothes over and over again as he feels they are clean because he does not go anywhere or sweats. She is also concerned about progression of disease and levels of care needed. OT would be beneficial to help problem solve dressing concerns and maximize safety within his functional limits at this time.     Patient prognosis is Fair due to  diagnosis   Patient will benefit from skilled outpatient Occupational Therapy to address the deficits stated above and in the  chart below, provide patient/family education, and to maximize patient's level of independence.     Plan of care discussed with patient: Yes  Patient's spiritual, cultural and educational needs considered and patient is agreeable to the plan of care and goals as stated below:     Anticipated Barriers for therapy: transportation     Medical Necessity is demonstrated by the following  Profile and History Assessment of Occupational Performance Level of Clinical Decision Making Complexity Score   Occupational Profile:   Hu Lopez is a 86 y.o. male who lives with their spouse and is currently retired. Hu Lopez has difficulty with  Dressing and decision making   affecting his/her daily functional abilities. His/her main goal for therapy is maximize safety within his home.     Comorbidities:   See chart, Dementia    Medical and Therapy History Review:   Brief               Performance Deficits    Physical:  Muscle Endurance  Visual Functions    Cognitive:  Sequencing  Orientation  Memory  Safety Awareness/Insight to Disability  Emotional Control    Psychosocial:    Habits  Routines    Wife (who also has dementia) has always managed traditional home care tasks     Clinical Decision Making:  low    Assessment Process:  Problem-Focused Assessments    Modification/Need for Assistance:  Significant Modifications/Assistance    Intervention Selection:  Limited Treatment Options       low  Based on PMHX, co morbidities , data from assessments and functional level of assistance required with task and clinical presentation directly impacting function.       The following goals were discussed with the patient and patient is in agreement with them as to be addressed in the treatment plan.     Goals:  Short Term Goals: 6-8 weeks   - Pt. And family will be instructed on fall prevention within the home to reduce fall risk  - Pt. Will have completed medical ID set up on his phone  - Pt. Will wear a new outfit daily after closet  adjustment  - Pt. And his wife will utilize all paid services in current living situation for home care tasks  - Pt. And family will manage a clean refrigerator to prevent eating of spoiled foods     More goals to be made as family reviews current safety and functional limitations         Plan   Certification Period/Plan of care expiration: 9/21/2022 to 11/25/22.    Outpatient Occupational Therapy 1 times weekly for 8 weeks to include the following interventions: Patient Education, Self Care, and Therapeutic Activities.    ERICA Grajeda      I certify the need for these services furnished under this plan of treatment and while under my care.  ____________________________________ Physician/Referring Practitioner   Date of Signature

## 2022-09-23 NOTE — PROGRESS NOTES
Adherence re-packed for 28 days using Dispill:  (Compressed monthly packing)        Morning pockets:  Cerovite Multi-vitamin  Galantamine 4 mg  Losartan 50 mg  Metoprolol succinate ER 50 mg  Myrbetriq 25 mg - added  Omeprazole 40 mg  Sertraline 100 mg  Vitamin B12 1,000 mcg  Vitamin D3 5,000 IU        Evening pockets:  Atorvastatin 40 mg  Galantamine 4 mg  Melatonin 5 mg - added  Montelukast 10 mg  Oxybutynin 10 mg

## 2022-09-27 ENCOUNTER — OFFICE VISIT (OUTPATIENT)
Dept: NEUROLOGY | Facility: CLINIC | Age: 87
End: 2022-09-27
Payer: MEDICARE

## 2022-09-27 ENCOUNTER — PATIENT MESSAGE (OUTPATIENT)
Dept: NEUROLOGY | Facility: CLINIC | Age: 87
End: 2022-09-27

## 2022-09-27 DIAGNOSIS — F02.818 OTHER FRONTOTEMPORAL DEMENTIA WITH BEHAVIORAL DISTURBANCE: Primary | ICD-10-CM

## 2022-09-27 DIAGNOSIS — F02.80 FRONTOTEMPORAL LOBAR DEGENERATION: ICD-10-CM

## 2022-09-27 DIAGNOSIS — G47.33 OSA (OBSTRUCTIVE SLEEP APNEA): ICD-10-CM

## 2022-09-27 DIAGNOSIS — G47.01 INSOMNIA DUE TO MEDICAL CONDITION: ICD-10-CM

## 2022-09-27 DIAGNOSIS — G31.09 FRONTOTEMPORAL LOBAR DEGENERATION: ICD-10-CM

## 2022-09-27 DIAGNOSIS — R32 URINARY INCONTINENCE, UNSPECIFIED TYPE: ICD-10-CM

## 2022-09-27 DIAGNOSIS — G31.09 OTHER FRONTOTEMPORAL DEMENTIA WITH BEHAVIORAL DISTURBANCE: Primary | ICD-10-CM

## 2022-09-27 PROCEDURE — 99443 PR PHYSICIAN TELEPHONE EVALUATION 21-30 MIN: ICD-10-PCS | Mod: 95,,, | Performed by: PSYCHIATRY & NEUROLOGY

## 2022-09-27 PROCEDURE — 99443 PR PHYSICIAN TELEPHONE EVALUATION 21-30 MIN: CPT | Mod: 95,,, | Performed by: PSYCHIATRY & NEUROLOGY

## 2022-09-27 RX ORDER — MIRABEGRON 25 MG/1
25 TABLET, FILM COATED, EXTENDED RELEASE ORAL DAILY
Qty: 30 TABLET | Refills: 1 | Status: SHIPPED | OUTPATIENT
Start: 2022-09-27 | End: 2022-12-14 | Stop reason: SDUPTHER

## 2022-09-27 RX ORDER — SUVOREXANT 10 MG/1
1 TABLET, FILM COATED ORAL NIGHTLY
Qty: 30 TABLET | Refills: 3 | Status: SHIPPED | OUTPATIENT
Start: 2022-09-27 | End: 2023-06-28

## 2022-09-27 NOTE — PROGRESS NOTES
[facility]  Name: Hu Lopez  : 1935  MRN: [id number]  Evaluation Date:     -----------------------------------------------------------------------------  I reviewed documents/diagnostics/laboratory/imaging records and communicated with the patient's family on 2022. This is directly related to a face-to-face visit encounter with the patient (Evaluation and Management service) conducted on 2022.  An Established-Patient Audio Only Telehealth Visit was requested by the provider with the family in regards to the workup performed between the patient's last clinical encounter on 2022 and 2022. A total of 30 minutes was spent with the family and/or patient via audio-only telemedicine discussing the patient's case from 10:15 AM until 10:45 AM on 2022. The reason for the audio-only service rather than synchronous audio and video virtual visit was related to technical difficulties or patient preference/necessity.  The patient's location is: Home  The chief complaint leading to consultation is: FTLD and care planning  Visit type: Virtual visit with audio only (telephone)  Each patient to whom I provide medical services by telemedicine is: (1) informed of the relationship between the physician and patient and the respective role of any other health care provider with respect to management of the patient; and (2) notified that they may decline to receive medical services by telemedicine and may withdraw from such care at any time. Patient verbally consented to receive this service via voice-only telephone call.  I reviewed the following documentation for a total of 20 minutes on 2022.  I reviewed previous labs for a total of 5 minutes on 2022. This is directly related to the face-to-face encounter. Review of previous labs was performed all negative except as stated above in HPI  I independently reviewed radiological imaging,  specimen, and tracing for a total of 15 minutes on 2022. This is directly related to the face-to-face encounter. Independent review of radiological imaging, specimen, and tracing was noted to be within normal limits except as stated above in HPI  A total amount of 50 minutes on 2022 was spent on documentation and communication. The CPT code(s) for billing for prolonged evaluation and management service (non-face-to-face review of records and/or communications with patient's family or other medical professionals):  [id number]  -----------------------------------------------------------------------------      Chief complaint: Progressive Cognitive Impairment     History of present illness:      Mr. Lopez is a 86-year-old right-handed male who presents today to the [facility] due to concerns related to progressive neurocognitive impairment.   Mr. Lopez is accompanied by daughters who participates in providing history.  Additional information is obtained by reviewing available medical records.     Relevant Background/Context  Known Relevant Family history:  Mother  late 80s/early 90s  Father  70s with bladder cancer  Neurocognitive Disorder:  The family denies a history of early/late onset cognitive impairment.  Movement Disorder:  The family denies a history of movement disorder (PD, PDD, tremor, etc).  Motorneuron Disorder:  The family denies a history of motor neuron disease (ALS).  Developmental Disorder:  The family denies a history of developmental learning disorder (Dyslexia, ADHD, ASD, etc.).  Psychiatric Disorder:  Paternal Uncle - schizophrenia?/mood NOS  Known Relevant Genetics:  There is no known relevant genetic testing available.  Developmental Milestones:  The patient/family report no known birth complications or early life problems. The patient met all developmental milestones.  Education/Learning Capacity:  The patient/family report no  "signs or symptoms suggestive of developmental learning disorder.  HS.  [facility]  Estimated Educational Experience: 16 years of formal education.  Social History:  Born in [place]. Moved to [place] at age 8 then back to [place] where completed HS. [place]oved to [place] in late 20s  Career/Skill Reserve:   - retired early 2000s  Retired/Quit: 0  Relevant Medical History:  Primary osteoarthritis of left knee  PARVEEN since childhood  HTN  HLD  Urinary incontinence     Neurocognitive Disorder Features  Onset/Duration:  Aug 2017 (~5-year)  First Symptom:  Behavior/Psychiatric impairment  Progression:  Gradually Progressive  Clinical Course:  Psychiatrist (08/16/2017)  Type: Chart Review. History of anxiety who presents for his family meeting. The patient has been seen in Internal Medicine on 6/15/17 and 7/24/17. He was started on Zoloft 25mg daily at 6/15/17 appointment with plan to increase to 50mg daily after 1 week. At follow up appointment on 7/24/17, he says he started the medication but then stopped because he felt he was urinating more often (had also stopped taking Oxybutynin around that time). The patient reports a history of anxiety but does not feel it has caused problems. Describes anxiety as "feels like things need to be quicker," especially when he worked as an . States he would occasionally get upset if things did not go a certain way. his family reports this was a big issue. his family states he worries and catastrophizes constantly "about everything", especially about his health. They feel the anxiety is so severe that is is causing the entire his family to be more "anxious and on edge. " They feel it is negatively affecting how the family functions and relates. The patient describes frequent worrying since he was a child. Also states that when he was young he would wash his hands over and over until his skin was raw. Says he does not do this anymore. But does have particular fears of germs (i. E. " Will not drink from the wine cup at [employer] or let any of his family members do so). He states he checks locks every night before he goes to bed but very rarely rechecks (only when going out of town). his family states he turns the hot water heater and electricity off when he goes out of town - they disagree about whether this is logical. his family describes anxiety as interfering from his ability to make decisions, especially in regard to spending money. The patient feels many of his traits are from his [place] culture. Denies symptoms of depression, including depressed mood, anhedonia, worthlessness, hopelessness, low energy, difficulty concentrating, changes in appetite/weight, or psychomotor agitation/retardation. Reports chronically poor sleep due to noncompliance with CPAP. Denies history of jamie, psychosis, SI, or HI.  .  [facility] - Óscar Miller MD. Neurologist (08/31/2022)  Type: Chart Review. The patient presents with his daughter and his wife. his daughters primary historian. Additional history is gathered from review of previous medical records. his family report a lifetime history of mild bother some untreated anxiety since childhood. Symptoms are otherwise not terribly bothersome and noninvasive. 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 The patient was otherwise in her normal state health until at minimum 2017 when the patient began treatment for unspecified anxiety NOS. The patient was started on Zoloft intact gradually titrate up to 100 mg. Is unclear when for the patient 1st began describing new cognitive impairment however his family recognized a G sharp decline in behavior and cognition in May 2021. Due to COVID related restrictions his family had minimal contact with the patient and his wife between 2019 and May 2021. Following patient's series of vaccines his family  began the recognized both him and his wife for doing poorly cognitively. In spring of 2021 of his family recognized that their home had fallen into disarray. There was damage to the walls and rat feces in the house. It was at this time his family recognized patient's his wife with developing severe cognitive impairment and with the later diagnosed with dementia. Patient's symptoms however were for more mild. Though he was more forgetful and limited insight into his wife's condition his family is concerns at this time morbid predominantly behavioral. The patient was repeating himself, forgetting conversations however his prior behavioral tendencies had magnified. Previous obsessive tendencies had worsened. his family recollect finding him putting various objects in boxes did not make sense. He would stack boxes within boxes and have atypical ordering of household appliances. Previous obsessive compulsive tendencies such as checking doors and locks became more dogmatic and ritualistic. Patient's behavioral side became more disinhibited. his family recollect him speaking openly about fat people in disparaging matter in public which was a change from his behavior previously. Furthermore his family recollect on 1 occasion he took a photo of his wife all changing which was also a change in his baseline temperament. his family deny any spicules specific apathy, inertia, empathy, sympathy, or eating related changes. his family deny any awareness of any specific language changes at this time however the report intermittent confusion as to commands or statements. They are uncertain whether patient's forgetting or having difficulty understanding certain conversation topics. The patient has gotten lost on more than 1 occasion however stopped driving in 21 due to progressive vision loss. his family denies any specific motor changes however do report he is walking slower with frequent falls and balancing problems. Due to  progressive vision and hearing loss in 2021 the patient moved to [place] for additional care and surgery. 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 While in [place] the patient was evaluated by [facility] memory care [place] and diagnosed with dementia a likely due to Alzheimer's. The patient presents today requesting 2nd opinion and to be established with regional neurology team. On presentation the patient is present inappropriate with moderate degree of hearing loss and limited insight into condition and situation. The observations made above were discussed with the patient and his family. The patient presents reporting a minimum 1-year history of idiopathic progressive multi-domain cognitive impairment; however, behavioral symptoms are likely worsening over the past five years without clear and demonstrable interference with activities of daily living. Initial symptoms recognized by his family were behavioral disturbances, specifically an increase in prior tendencies, including obsessive-compulsive, ritualistic, simple motor tics, perseverative behavior,  and mild amnesia. Over the last year, symptoms have progressed. The patient was recently evaluated by an outside hospital in [place]. Records are not available for review. The diagnosis was late-onset amnestic dementia. Neurocognitive assessment performed today shows executive predominant multi-domain mild cognitive impairment. Neurological examination is consistent with asymmetric right greater than left parkinsonism, gait instability, motor apraxia, dyskinetic movements, with mild semantic aphasia. Imaging provided from the outside hospital shows evidence of bilateral anterior temporal atrophy with relatively pronounced medial prefrontal cortical atrophy with regional atrophy of the anterior cingulate bilateral with mild left greater than right  cuneus atrophy. 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 The majority of the patient's most prominent symptoms are localized to the prefrontal/anterior temporal region. This correlates well with available brain imaging. Given the patient's advanced age, the most likely underlying pathology is mixed, including but not limited to Alzheimer's disease-related pathology and TDP43 proteinopathy. Primary concerns at this time or falls. The patient has gait instability pronounced during evaluation. The patient nearly falls during simple walking. Recommend evaluation by neuro-physical therapy team with consideration for Parkinson's specific physical therapy. The patient has ongoing insomnia with CPAP, not interference. Will refer to Sleep Medicine to get a CPAP machine. Start Belsomra 10 mg for insomnia. Recommend EKG before starting acetylcholine esterase inhibitor.     Current Presentation  Recent/Interim History:  Since last time seen, serum laboratories were drawn. 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 Serum laboratories were otherwise unremarkable aside from elevated neurofilament light chain which suggests cns degeneration. Mri brain from outside hospital was reviewed. 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 These findings were discussed with the family. Primary concerns at this time include the patient wellness. his family is in the process of relocating the patient to a home. Due to cognitive impairment and difficulty with decision making the patient has been very paranoid regarding his savings. his family has requested support for durable power of  financial on all the help in decision making process for the code localization of both the patient and his wife into a long-term home with adequate care support. The patient is currently living in [place]  house however is unclear how long the be able to afford live there. We will get his family situated with [facility] system for care management planning. We provided links to local elder care attorneys.  Unresolved Concern(s) reported by patient/family:  Gait instability/parkinsonism - referral to PT  Insomnia/CALVIN -referral to sleep medicine + belsomra  Prodromal dementia - start ACheI  Hearing loss  Vision Loss        Review of cognitive, visuospatial, motor, sensory, and behavioral systems:     Memory:   Mr. Monroe memory has worsened in the past few years.  He does repeat statements or asks the same question repeatedly.  He does have difficulty remembering recent important conversations.  He does have difficulty remembering recent events.  He does forget information within minutes.  His remote memory is intact.  His recent retrograde memory is intact.  Attention:   His attention and concentration are impaired.  He does not have attentional fluctuations.  He does have difficulty with selective attention.  He does become easily distracted.  He does have difficulty with divided attention.  Executive:   Mr. Monroe cognitive processing speed is slower.  He does have difficulty with working memory.  He does misplace personal items (e.g., keys, cell phone, wallet) more frequently.  He does have difficulty keeping track of his medications.  He does have difficulty with planning/organizing/completing multistep tasks.  He does have difficulty with executive attention.  He does not have difficulty with flexible thinking.  He does not difficulty with self control.  He denies new impulsivity or rash/careless actions.  His judgment is impaired.  Language:   His speech output is affected.  He does not forget people's names more frequently.  He does not have word-finding difficulties.  Mr. Monroe speech is fluent and non-effortful.  Mr. Monroe speech is grammatically intact.  He does not make word substitutions.  He does have  difficulty reading.  He appears to have impaired comprehension.  Visuospatial:   He has new visuospatial problems.  He has become confused or disoriented in *new*, unfamiliar places.  He does have trouble navigating.  Mr. Lopez does not have visuospatial disorientation.  He does not have difficulty recognizing objects or faces.  He denies problems with driving or parking.  Motor/Coordination:   Mr. Lopez does have difficulty with walking.  He does feel imbalanced.  He has fallen.  He reports new muscle weakness.  He does have difficulty buttoning shirts, operating zippers, or manipulating tools/utensils.  His handwriting has not become micrographic.  He does not have a resting tremor.  He denies having any new involuntary movements and/or muscle jerking.  He does not have swallowing difficulty.  He denies new muscle cramps and twitching.  Sensory:   Mr. Lopez denies new numbness, tingling, paresthesias, or pain.  Mr. Lopez reports new loss of vision, blurry vision, and/or double vision.  Mr. Lopez reports a recent loss of hearing and/or worsening tinnitus.  Mr. Lopez does have anosmia.  Sleep:   Mr. Lopez reports difficulty sleeping.  Mr. Lopez does have difficulty going to sleep.  Mr. Lopez reports difficulty staying asleep and/or frequently awakening at night.  Mr. Lopez does snore and/or have witnessed apneas while sleeping.  When he wakes up in the morning, he does not feel well-rested.  He denies dream-enactment behavior.  He denies symptoms suggestive of restless leg syndrome.  Behavior:   Mr. Lopez's personality has changed.  He does have symptoms of disinhibition and social inappropriateness.  He is exhibiting symptoms to suggest a loss of manners and/or decorum.  He does not appear apathetic or has decreased motivation.  He does not appear to have a change in inertia.  There is no report that Mr. Lopez has had a change in their emotional expression.  He does not have emotional blunting or lability.  He does  have symptoms of irritability and mood lability.  He does not have symptoms of agitation, aggression, or violent outbursts.  His insight into his disease and situation is impaired.  His personal hygiene has become impaired.  He is not exhibiting a diminished response to other people's needs and feelings.  He is not exhibiting a diminished social interest, interrelatedness, or personal warmth.  He denies restlessness.  He does have new and/or worsening simple repetitive behaviors.  His speech has not become simplified or become repetitive/stereotyped.  He reports symptoms that are suggestive of new/worsening complex repetitive/ritualistic compulsions and behaviors.  He does not have symptoms of hyper-religiosity or dogmatism.  His interests/pleasures have not become restrictive, simplified, interrupting, or repetitive.  He denies a change of self-stimulating behavior.  He denies any changes in eating behavior.  He denies increased consumption of food or substances.  He denies oral exploration or consumption of inedible objects.  Psychiatric:   He does feel depressed.  He is exhibiting symptoms of social withdrawal/indifference.  He does have anxiety.  He does not exhibit cycling behavior.  He does not exhibit hyperactive behavior.  He is not exhibited symptoms of paranoia.  He does not have delusions.  He does not have hallucinations.  He does not have a history of sensitivity to neuroleptic/psychotropic medications.  Medical Review of Systems:   Mr. Lopez does have constipation.  Mr. Lopez does have urinary incontinence.  Mr. Lopez reports symptoms that are suggestive of orthostatic lightheadedness.  Mr. Lopez's weight is stable.  Functional status:  Difficulty performing the following Instrumental ADLs:  Housekeeping: No Comment: unclear given comorbidities  Food Preparation: No  Shopping: No  Ability to Handle Finances: No  Transportation/Driving: Yes Comment: due to vision loss  Household Appliances/Stove: No  Comment: unclear given comorbidities  Laundry: No Comment: unclear given comorbidities  Difficulty performing the following Basic ADLs:  Dressing: No  Bathing: No  Toileting: No  Personal hygiene and grooming: No  Feeding: No  Care Management:  Patient/Family Safety Concerns:  Medication Adherence: No  Home Safety: No  Wandered: No  Firearms: No  Fall Risk: No  Home Alone: No       Neuropsychological Evaluation Summary:     Prior Neurocognitive/Neuropsychological Evaluations  Summary from EMR:  2022-08-31:  Executive predominant multidomain mild cognitive impairment  Moderate Memory Impairment: He scored >3 standard deviations below the norm on at least one measure. He had difficulty with encoding, attention, recall. He had a incomplete learning curve. His free recall was significantly impaired by time.  Moderate Executive Impairment: working memory, lexical fluency.  Mild Language Impairment: semantic association, Nonfluent, Semantics.  Very Mild Visuospatial Impairment: visuospatial construction.  MMSE 26/30: MMSE Score suggestive of normal to questionable cognitive impairment.  MOCA 24/30: MOCA Score suggestive of mild cognitive impairment.  BEHAV5+ 3/6: See ROS section for a full description    Neurocognitive Evaluation completed on 09/27/2022:  Neuropsychiatric/Behavioral Focused Evaluation Assessment   BEHAV5+ 3/6 See ROS section for a full description   Laboratories:     Lab Date Value [Reference]   Metabolic Screening           HDL Particle Number 2022, Aug-31    24.8 (L) [>=33.0 umol/L]      HDL Size 2022, Aug-31    8.5 (L) [>=8.9 nm]      Large VLDL Particle Number 2022, Aug-31    1.8      LDL Particle Number by NMR 2022, Aug-31    [id number]      LDL Particle Size 2022, Aug-31    20.8 [>=20.7 nm]      Lipids, HDL Cholesterol 2022, Aug-31    34 (L) [40 - 59 mg/dL]      Lipids, LDL Cholesterol 2022, Aug-31    75      Lipids, Total Cholesterol 2022, Aug-31    139      Lipids, Triglycerides 08/31/2022  152 (H) [30  - 149 mg/dL]      Methlymalonic Acid 2022, Aug-31    0.22      Small LDL Particle Number 08/31/2022  644 (H)      T4 Total 08/31/2022  7.0 [4.5 - 11.5 ug/dL]      TSH 2022, Aug-31    1.779 [0.400 - 4.000 uIU/mL]      VLDL Size 2022, Aug-31    46.8 (H)      Glucose 2022, Aug-31    117 (H) [70 - 110 mg/dL]      Albumin 2022, Aug-31    4.0 [3.5 - 5.2 g/dL]      Alkaline Phosphatase 2022, Aug-31    73 [55 - 135 U/L]      ALT 2022, Aug-31    21 [10 - 44 U/L]      AST 2022, Aug-31    21 [10 - 40 U/L]      BILIRUBIN TOTAL 2022, Aug-31    0.7 [0.1 - 1.0 mg/dL]      PROTEIN TOTAL 2022, Aug-31    6.8 [6.0 - 8.4 g/dL]      Cholesterol 2022, Aug-25    142 [120 - 199 mg/dL]      HDL 2022, Aug-25    42 [40 - 75 mg/dL]      Non-HDL Cholesterol 2022, Aug-25    100 [mg/dL]      Triglycerides 08/31/2022  87 [30 - 150 mg/dL]      Folate 08/31/2022  9.5 [4.0 - 24.0 ng/mL]      Thiamine 08/31/2022  58 [38 - 122 ug/L]      Vitamin B-12 2022, Aug-31    >1400 (H) [180 - 914 ng/L]      Cerebrospinal Fluid Assessment   IgG 08/31/2022  823 [650 - 1600 mg/dL]      Neuroendocrine/Electrolyte Screening   Magnesium 2022, Aug-31    2.1 [1.6 - 2.6 mg/dL]      BUN 2022, Aug-31    28 (H) [8 - 23 mg/dL]      Chloride 2022, Aug-31    104 [95 - 110 mmol/L]      Creatinine 2022, Aug-31    1.8 (H) [0.5 - 1.4 mg/dL]      Potassium 2022, Aug-31    4.3 [3.5 - 5.1 mmol/L]      Sodium 2022, Aug-31    137 [136 - 145 mmol/L]      Standard Hematology Screen   Hematocrit 2022, Aug-31    39.2 (L) [40.0 - 54.0 %]      Hemoglobin 2022, Aug-31    13.5 (L) [14.0 - 18.0 g/dL]      MCV 2022, Aug-31    94 [82 - 98 fL]      Platelets 08/31/2022  194 [150 - 450 K/uL]      Infectious Disease/Immunocompromised Screening   HIV 1/2 Ag/Ab 08/31/2022  Non-reactive      Syphilis Treponemal Ab 2022, Aug-31    Nonreactive [Nonreactive]           Neuroimaging:    MRI brain/head without contrast on 5/29/2022  Formal interpretation by Radiology:  not available  Independently reviewed  radiological imaging by Óscar Leahy MD. MPH. 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 severe b/l hippocampal atrophy. No significant CC or brainstem atrophy. mild to moderate posterior cortical atrophy.  T2/FLAIR: No Significant hyperintensities appreciated on MRI T2/FLAIR  DWI/ADC: No Significant DWI hyperintensities/hypointensities. No ADC correlation.  SWI/GRE: No Significant hypointensities to suggest cortical/subcortical hemosiderin deposition.  Impression: : FTLD R>L with moderate degree of background generalized atrophy     Procedures:    Electrocardiogram on 6/4/2012  Formal interpretation:  Vent. Rate : 063 BPM     Atrial Rate : 063 BPM    P-R Int : 200 ms          QRS Dur : 084 ms     QT Int : 396 ms       P-R-T Axes : 036 010 045 degrees    QTc Int : 405 ms Normal sinus rhythm Normal ECG  Independently reviewed Electrocardiogram by Óscar Leahy MD. MPH. Behavioral Neurologist  Impression: : Received ECG has no evidence of sinus node disease. HR (>=50-60). Prolonged IL interval (>0.22 s). Broad QRS complex (> 0.12 s).     Clinical Summary:     Mr. Lopez is a 86-year-old right-handed male with a relevant past medical history of HTN, HLD, CALVIN on CPAP, insomnia, PARVEEN, hearing loss, visual impairment, who presents reporting a 5-year history of gradually progressive neurocognitive impairment.       The clinical history is suggestive of:  Memory Impairment: STM encoding impairment, LTM encoding-retrieval impairment, Amnesia of fixation  Attention Impairment: Attention, Selective attention, Sustained attention, Shifting attention  Executive Impairment: Energization, Working Memory, Set-Shifting, Response Inhibition  Language Impairment: Language Dysfunction, Logopenic Dysfunction, Receptive Dysfunction  Visuospatial Impairment: Allocentric Spatial Processing  Motor/Coordination Impairment: Sensory motor integration, Motor weakness  Sensory  Impairment: Sensory Deprivation, Limbic Dysfunction  Behavior Impairment: Emotional Regulation, Self-Preservation Dysregulation, Sensorimotor Dysregulation  Psychiatric Impairment: Neurovegetative, Social Coherence, Signal-Noise Dysregulation  Medical Review of Systems Impairment: Autonomic Dysfunction  iADL Impairment: [place] Instrumental Activities of Daily Living Scale  The neurological examination is significant for:  Cerebellar Dysfunction: truncal ataxia (sitting, walking)  Cortical Frontal Dysfunction: non-fluent aphasia (fluency), agrammatic aphasia (syntax comprehension)  Cortical Frontal-Parietal Disconnection: apraxia (limb-motor)  Cortical Temporal Dysfunction: anomic aphasia (spontaneous, confrontational)  Cortical Temporal-Parietal Dysfunction: left-right confusion, dysgraphia  Cortical Transcallosal Disconnection: interhemispheric motor control (interhemispheric motor control ), motor efference (motor overflow)  Executive Impairment: thought disorder, dysexecutive behavior (perseverative/stereotyped)  Motor Coordination: gait imbalance (tiptoes)  Motor Dysfunction: muscle atrophy  Movement Disorder (Gait): strength (difficulty rising), abnormal features (abnormal posture, initiation/inhibition, stance, speed, stride/cycle, abnormal swing, difficulty turning), gait syndrome (rigid-akinetic)  Movement Disorder (Hyperkinetic): dyskinetic movements  Movement Disorder (Hypokinetic): parkinsonism (diminished facial expression, diminished facial expression, tone, bradykinesia), dyskinesia (slowing, hypometria, dysrhythmia)  Movement Disorder (Speech): abnormal vocal features (rate, articulation)  Sensory Dysfunction: hearing (diminished, hearing aides), peripheral (A? 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     Mr. Hickmans clinical presentation is suggestive of FTLD NOS as they have features of both Behavioral variant Frontotemporal Dementia (bvFTD) (Rasdipti et al., Brain 2011) and Semantic variant Primary Progressive Aphasia (svPPA) (Hilary CORLEY, et al. Neurology. 2011) without meeting criteria for either.      Early behavioral disinhibition: socially inappropriate behavior, loss of manners or decorum, impulsive, rash or careless actions  Early perseverative, stereotyped, or compulsive/ritualistic behavior: simple repetitive movements, complex, compulsive or ritualistic behaviors, stereotypy of speech.  On neuropsychological testing: deficits in executive tasks with relative sparing of episodic memory and visuospatial skills.  Impaired confrontation naming.  Impaired object knowledge, particularly for low-frequency or low-familiarity items.  Spared repetition.  Spared speech production (grammar and motor speech).  Predominant medial prefrontal/anterior temporal lobe atrophy.     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  Given patient's advanced age, the pathology underlying Mr. Hickmans neurocognitive impairment is likely a mixture of pathologies (Alzheimer's Disease Related Pathology, TAR DNA-binding protein 43).    The observations made above were discussed with the patient and his family. There are no problems behavior at this time. Medications are being optimized. Oxybutynin has been stopped. his family are having difficult time affording Belsomra in bed trick. We  will facilitate with necessary coupons and prior authorization. Will get his family set up with [facility] system for care planning. We provided list of elder law  is in the area.     Prior Authorization Statement(s) Suvorexant (as well as other orexin inhibitors) is extremely effective, well-tolerated in those over 65 with minimal adverse reactions in clinical trials, and non-addictive. In clinical trials it was shown to improve total sleep time, sleep latency, waking after sleep onset, and quality of sleep, and the only adverse reaction that clearly  from placebo was morning grogginess (Suvorexant 7%, placebo 3%). If insurance requires a prior authorization, guidelines established by the American Academy of Sleep Medicine (AASM) should be sufficient justification for using Suvorexant. Per the AASM guidelines (Aroldo et al, J Clin Sleep Med, 2017;13(2):307-349), Suvorexant is the most reasonable choice for the treatment of the insomnia in those over 65, particularly in the setting of cognitive impairment. Benzodiazepines (e.g., Triazolam, Temazepam), Z drugs (e.g., Zolpidem, Eszopiclone), and anticholinergic medications (e.g., Doxepin) are not recommended to treat insomnia in those over 65 and are listed on the AGS Beers Criteria (AGS Beers Criteria Update Expert Panel, J Am Geriatr Soc, 2019;67(4):674-694). These medications increase the risk for falls and worsen cognition. Other medications, including Zaleplon and Ramelteon are only indicated for sleep-onset insomnia. Trazodone is not recommended by AASM for the treatment of insomnia.      Care Management Plan:     #Neurocognitive Disorder Treatment:  Continue Razadyne 4 mg  Continue Mybetriq 25 mg   We have provided references to elder law attorneys  We have made referral to care ecosystem  Recommend the MIND Diet  #Behavioral Disorder Treatment:  No indication for memantine at this time  Continue Zoloft 100mg  #Insomnia Treatment:  f/u Sleep  medicine for SXS suggest of CALVIN  We recommend a trial of Belsomra 10mg qHS  #Microvascular Disease Management:  Following fasting lipid profile results, we will consider adding pravastatin or rosuvastatin  We recommend switching patient from ongoing lipophilic statin therapy to hydrophilic statin therapy (Weston et al., 2015; Gates et al., 2018; Lawrence et al., 2021, Ajay et al., 2021).  #Behavioral/Environmental Treatment  We recommend engaging in activities that stimulate cognitively and socially while avoiding excessive stimulation and fatigue in overwhelmingly complex situations.  We recommend integrating routine and schedule into your daily life. https://www.alzheimersproject.org/news/the-importance-of-routine-and-familiarity-to-persons-with-dementia/  #Health Maintenance/Lifestyle Advice  We have discussed the value in aggressively controlling vascular risk factors like hypertension, hyperlipidemia, and Diabetes SBP<130, LDL<100, A1C<7.0.  We discussed the need to optimize lifestyle choices including a heart-healthy diet (e.g., Mediterranean or DASH), increased cardiovascular exercise (goal 150 minutes of moderate-intensity per week), and stay cognitively and socially active.  #Support  We all need support sometimes. Get easy access to local resources, community programs, and services. https://www.communityresourcefinder.org/  Learn more about Cognitive Impairment in [place]: https://www.alz.org/professionals/public-health/state-overview/[place]  #Safety  [place] has no laws against driving with dementia specifically but obviously has laws about medical conditions which impact a person's ability to drive safely. If you believe your loved one's driving capacity has diminished, please reach out to either your primary care physician or our office to discuss driving restrictions or revocation of their license. To learn more: https://www.dementiacarecentral.com/caregiverinfo/driving-problems/  The  "Alzheimer's Association administers the nationwide "Safe Return" program with identification bracelets, necklaces, or clothing tags and 24-hour assistance. More information is available online at https://www.alz.org/help-support/caregiving/safety/medicalert-with-24-7-wandering-support  #Follow up:  Follow-up as needed.    Thank you for allowing us to participate in the care of your patient. Please do not hesitate to contact us with any questions or concerns.     It was a pleasure seeing Mr. Lopez and we look forward to seeing them at their follow-up visit.     This note is dictated on M*Modal Fluency Direct word recognition program. There are word recognition mistakes that are occasionally missed on review. This service was not originating from a related E/M service provided within the previous 7 days nor will  to an E/M service or procedure within the next 24 hours or my soonest available appointment. Prevailing standard of care was able to be met in this audio-only visit.    "

## 2022-10-19 ENCOUNTER — DOCUMENTATION ONLY (OUTPATIENT)
Dept: REHABILITATION | Facility: HOSPITAL | Age: 87
End: 2022-10-19
Payer: MEDICARE

## 2022-10-19 NOTE — PROGRESS NOTES
Patient has scheduled any further therapy follow up sessions since evaluation completed on 09/16/22. Patient will need new orders to return to therapy, if indicated in the future. Patient is discharged from outpatient physical therapy services.     Carrie Khanna, PT  10/19/2022

## 2022-10-21 NOTE — PROGRESS NOTES
Adherence re-packed for 28 days using Dispill:  (Compressed monthly packing)        Morning pockets:  Cerovite Multi-vitamin  Galantamine 4 mg  Losartan 50 mg  Metoprolol succinate ER 50 mg  Myrbetriq 25 mg  Omeprazole 40 mg  Sertraline 100 mg  Vitamin B12 1,000 mcg  Vitamin D3 5,000 IU        Evening pockets:  Atorvastatin 40 mg  Galantamine 4 mg  Melatonin 5 mg   Montelukast 10 mg

## 2022-10-31 ENCOUNTER — TELEPHONE (OUTPATIENT)
Dept: NEUROLOGY | Facility: CLINIC | Age: 87
End: 2022-10-31
Payer: MEDICARE

## 2022-10-31 ENCOUNTER — PATIENT MESSAGE (OUTPATIENT)
Dept: NEUROLOGY | Facility: CLINIC | Age: 87
End: 2022-10-31
Payer: MEDICARE

## 2022-10-31 NOTE — TELEPHONE ENCOUNTER
----- Message from Elisabeth Sawyer sent at 10/31/2022 10:33 AM CDT -----  Regarding: Please reach out to shonetns daughter   Contact: PT daughter Ekaterina   Patients daughter is requesting possibly an e-mail address to get letter regarding patients daughter obtaining power of  for both parents, mom's MRN will be listed below and separate epic message sent  [phone]  (Ekaterina Ahn)      MINDI APPIAH [[id number]]

## 2022-11-01 NOTE — TELEPHONE ENCOUNTER
"Spoke with Ekaterina (pt dtr), she states "I messaged Dr. Miller asking him what is the best form of communication regarding a letter of diagnosis for my father". She was informed the message has not been read to give it the rest of the week, if no response to contact the clinic. She verbalized understanding   "

## 2022-11-03 ENCOUNTER — OFFICE VISIT (OUTPATIENT)
Dept: SLEEP MEDICINE | Facility: CLINIC | Age: 87
End: 2022-11-03
Payer: MEDICARE

## 2022-11-03 VITALS
SYSTOLIC BLOOD PRESSURE: 151 MMHG | BODY MASS INDEX: 27.72 KG/M2 | HEART RATE: 68 BPM | WEIGHT: 198 LBS | HEIGHT: 71 IN | DIASTOLIC BLOOD PRESSURE: 85 MMHG

## 2022-11-03 DIAGNOSIS — G47.10 HYPERSOMNOLENCE: Primary | ICD-10-CM

## 2022-11-03 DIAGNOSIS — F51.09 OTHER INSOMNIA NOT DUE TO A SUBSTANCE OR KNOWN PHYSIOLOGICAL CONDITION: ICD-10-CM

## 2022-11-03 DIAGNOSIS — G47.33 OSA (OBSTRUCTIVE SLEEP APNEA): ICD-10-CM

## 2022-11-03 DIAGNOSIS — R35.1 NOCTURIA: ICD-10-CM

## 2022-11-03 PROCEDURE — 99999 PR PBB SHADOW E&M-EST. PATIENT-LVL IV: ICD-10-PCS | Mod: PBBFAC,,, | Performed by: INTERNAL MEDICINE

## 2022-11-03 PROCEDURE — 99204 PR OFFICE/OUTPT VISIT, NEW, LEVL IV, 45-59 MIN: ICD-10-PCS | Mod: S$PBB,,, | Performed by: INTERNAL MEDICINE

## 2022-11-03 PROCEDURE — 99214 OFFICE O/P EST MOD 30 MIN: CPT | Mod: PBBFAC | Performed by: INTERNAL MEDICINE

## 2022-11-03 PROCEDURE — 99204 OFFICE O/P NEW MOD 45 MIN: CPT | Mod: S$PBB,,, | Performed by: INTERNAL MEDICINE

## 2022-11-03 PROCEDURE — 99999 PR PBB SHADOW E&M-EST. PATIENT-LVL IV: CPT | Mod: PBBFAC,,, | Performed by: INTERNAL MEDICINE

## 2022-11-03 NOTE — PROGRESS NOTES
"NEW PATIENT VISIT LOV 8.28.2015 NP Shemar Bradforddeb Lopez  is a pleasant 86 y.o. male  with PMH significant for CALVIN, anxiety, dementia who presents for evaluation and treatment of previously diagnosed CALVIN.   Has not been using cpap recently, has a very old machine but reports significant improvement in sleep when using. Not working since then. Just moved to [place], staying at [facility].     Prior sleep studies:   Yes, few years ago.     Trouble falling asleep?: 1 hour  Trouble staying asleep?: yes  Hypnotic use?:  Melatonin, awaiting approval for belsomra    Bed partner:    yes  Witnessed snoring ?:   yes  Snoring arousals?:  yes  Witnessed apneas?  yes    Sleepy if inactive?:  yes  ESS:        PSG 2/8/2010: MS, AHI 26, rx 8cwp    PAP history   Problems    Mask Tried several (mouth opening)   Pressure    DME HME Cher   Machine age Several years   Download          SLEEP SCHEDULE   Bed Time 9pm   Sleep Latency 1 hour.   Arousals 3x nighly   Back to sleep 20-30 minutes   Wake time 6:30am   Naps Yes, after lunch   Nocturia yes   Work       Vitals:    11/03/22 1046   BP: (!) 151/85   Pulse: 68   Weight: 89.8 kg (198 lb)   Height: 5' 11" (1.803 m)       Physical Exam:    GEN:   Well-appearing  Psych:  Appropriate affect, demonstrates insight  SKIN:  No rash on the face or bridge of the nose    LABS:   Lab Results   Component Value Date    HGB 13.5 (L) 08/31/2022    CO2 23 08/31/2022       RECORDS REVIEWED PREVIOUSLY:        ASSESSMENT    No flowsheet data found.    Presenting Complaint:    PROBLEM DESCRIPTION/ Sx on Presentation  STATUS   Moderately severe CALVIN   + snoring, + snoring arousals, + witnessed apneas     New   CPAP intolerance: mouth opening,    Mouth opening in the past  Frequent problems with masks in the past     Daytime Sx   + sleepiness when inactive (dozing off after lunch)  ESS 12/24 on intake  New   Insomnia   Onset:                    trouble falling asleep  Maintenance:         " waking frequently  Prior hypnotics:        Current hypnotics: MLT (belsomra 10mg on med list but not sure if taking)    New   Nocturia   x 3-4 per sleep period  New   Other issues: Alzheimers, HTN, hx PrCancer, NEO, GERD,  CALVIN dx 2010, AHI 26, CPAP =minimal usage    PLAN     -recommend sleep testing  (CPAP titration to requalify)  -the patient's machine is nearing the end of its usable life, needs a new one  -discussed trial therapy if CALVIN present and the patient is  open to another trial of CPAP therapy    RTC          The patient was given open opportunity to ask questions and/or express concerns about treatment plan.   All questions/concerns were discussed.     Two patient identifiers used prior to evaluation.

## 2022-11-04 ENCOUNTER — PATIENT MESSAGE (OUTPATIENT)
Dept: NEUROLOGY | Facility: CLINIC | Age: 87
End: 2022-11-04
Payer: MEDICARE

## 2022-11-07 ENCOUNTER — TELEPHONE (OUTPATIENT)
Dept: SLEEP MEDICINE | Facility: OTHER | Age: 87
End: 2022-11-07
Payer: MEDICARE

## 2022-11-11 ENCOUNTER — TELEPHONE (OUTPATIENT)
Dept: SLEEP MEDICINE | Facility: OTHER | Age: 87
End: 2022-11-11
Payer: MEDICARE

## 2022-11-11 ENCOUNTER — OUTPATIENT CASE MANAGEMENT (OUTPATIENT)
Dept: NEUROLOGY | Facility: CLINIC | Age: 87
End: 2022-11-11
Payer: MEDICARE

## 2022-11-12 ENCOUNTER — HOSPITAL ENCOUNTER (OUTPATIENT)
Dept: SLEEP MEDICINE | Facility: OTHER | Age: 87
Discharge: HOME | End: 2022-11-12
Attending: INTERNAL MEDICINE
Payer: MEDICARE

## 2022-11-12 DIAGNOSIS — G47.10 HYPERSOMNOLENCE: ICD-10-CM

## 2022-11-12 DIAGNOSIS — F51.09 OTHER INSOMNIA NOT DUE TO A SUBSTANCE OR KNOWN PHYSIOLOGICAL CONDITION: ICD-10-CM

## 2022-11-12 DIAGNOSIS — G47.33 OSA (OBSTRUCTIVE SLEEP APNEA): ICD-10-CM

## 2022-11-12 DIAGNOSIS — R35.1 NOCTURIA: ICD-10-CM

## 2022-11-12 PROCEDURE — 95811 POLYSOM 6/>YRS CPAP 4/> PARM: CPT

## 2022-11-12 PROCEDURE — 95811 POLYSOM 6/>YRS CPAP 4/> PARM: CPT | Mod: 26,,, | Performed by: INTERNAL MEDICINE

## 2022-11-12 PROCEDURE — 95811 PR POLYSOMNOGRAPHY W/CPAP: ICD-10-PCS | Mod: 26,,, | Performed by: INTERNAL MEDICINE

## 2022-11-13 NOTE — PROGRESS NOTES
CPAP Titration was performed on Hu Lopez The entire procedure was explained, patient was informed that there may be a need to enter the room during the night to fix a lead or make an adjustment to the equipment, and bio calibrations were completed. He was given instructions on how to call out for assistance.    Patient education was performed. He was given the after visit summary.    Medium Resmed F30

## 2022-11-17 DIAGNOSIS — J30.2 SEASONAL ALLERGIES: ICD-10-CM

## 2022-11-17 RX ORDER — MONTELUKAST SODIUM 10 MG/1
10 TABLET ORAL NIGHTLY
Qty: 30 TABLET | Refills: 0 | Status: CANCELLED | OUTPATIENT
Start: 2022-11-17 | End: 2022-12-17

## 2022-11-18 DIAGNOSIS — J30.2 SEASONAL ALLERGIES: ICD-10-CM

## 2022-11-18 RX ORDER — MONTELUKAST SODIUM 10 MG/1
10 TABLET ORAL NIGHTLY
Qty: 30 TABLET | Refills: 11 | Status: SHIPPED | OUTPATIENT
Start: 2022-11-18 | End: 2023-06-28

## 2022-11-21 ENCOUNTER — OUTPATIENT CASE MANAGEMENT (OUTPATIENT)
Dept: NEUROLOGY | Facility: CLINIC | Age: 87
End: 2022-11-21
Payer: MEDICARE

## 2022-11-21 NOTE — PROGRESS NOTES
Adherence packed for 28 days using Dispill:  (Compressed monthly packing)        Morning pockets:  Cerovite Multi-vitamin  Galantamine 4 mg  Losartan 50 mg  Metoprolol succinate ER 50 mg  Myrbetriq 25 mg  Omeprazole 40 mg  Sertraline 100 mg  Vitamin B12 1,000 mcg  Vitamin D3 5,000 IU        Evening pockets:  Atorvastatin 40 mg  Galantamine 4 mg  Melatonin 5 mg   Montelukast 10 mg        *Spoke to daughter, Ekaterina, to let her know the co-pay. She approved the charge and approved same-day delivery to [facility] today.

## 2022-11-22 NOTE — PROGRESS NOTES
"CARE SOCIAL WORK PSYCHOSOCIAL ASSESSMENT - DEMENTIA CARE MANAGEMENT     REASON FOR VISIT; INFORMANT; RELATIONSHIP   Phone consultation with caregiver, daughter Rosalinda, to address concerns about living situation and long-term planning for both pt and his wife, who also has dementia.     PSYCHOSOCIAL ASSESSMENT        Location (own home, family, facility)    In [facility] temporarily   Resides with (name, relationship) Wife Lulu   Primary Caregiver (if different from "resides with") name, relationship Daughter Rosalinda coordinates care, appts, etc   Is a caregiver present during day? Overnight? Yes - [facility] staff  Yes - [facility] staff   Marital/relationship status    Financial Status Comfortable    Status NA   ACP Documents Yes[place] ly has had local  update. Has a provision to activate with documentation from doctor; fmly in communication with doctor about this.   Additional information After resuming in-person contact with pt and wife after threat of Covid had passed, fmly discovered they had deteriorated, house was a mess, rat droppings, hoarding, etc. Been in [facility] since July, following an [facility] stay in [place] near son for pt's eye surgery; fmly looking for permanent housing, likely a condo, with plan to hire a caregiver. Home is not currently in sellable condition, tangled in insurance issues, which affects finances/options. Pt and wife anxious about amount they are spending to be at [facility].      Caregiver's Concerns Pt getting contacted by krishna  Pt and wife are primary Mauritanian speakers so dtr concerned about locating [place]itanian-speaking caregivers  How to locate/determine appropriate housing situation  Pt is supposed to go to PT twice weekly, but transport is difficult; he wants to Uber, not safe.         SUPPORTS:    Additional Caregivers/relationship Son is in [place]; monitors banking. Two dtrs locally but they work, have kids. Grandchildren visit   Patient engagement " (activities, hobbies) Pt and wife have been reluctant to engage in activities at [facility].   Caregiver's natural supports/self-care Dtr's and her siblings share responsibilities   Resources currently utilized Pt and wife in [place]      MOOD/BEHAVIOR  At baseline pt was anxious, stubborn, intolerant of discomfort; these traits now exacerbated.  Illogical.  Refuses to wear hearing aids.              PROBLEM AREAS IDENTIFIED                                           PLAN  Identifying appropriate living situation, locating, moving Brainstorming, education   Activating PoA docs In progress with doctor   Gemma reaching pt by phone Education, strategies   Need for [place] speaking caregivers Brainstorming   Caregiver stress Education, support, resources/groups    Getting PT Options for transportation or alternate setting                                                                                                            RESOURCES/REFERRALS PROVIDED TODAY:  Psycho-education, support, brainstorming, resource education.  Addressed pros and cons of various options and actions.   Advised on options of fee-based geriatric manager to accompany to appts     MANAGEMENT FOLLOW-UP PLAN:   Email resources.

## 2022-11-22 NOTE — PROGRESS NOTES
Emailed the following to caregiver:  [place] for Dementia Caregiver Support Group  [employer] for Dementia Education Series  [employer] for Dementia Caregiver SKILLS Group  [place] for in-person Dementia Caregiver Support Group  Link to search for Alzheimer's Association virtual groups  [place] for Finding Meaning and Hope video discussion series  Two general Tip Sheets  Holiday Tip Sheet  Home Safety Checklist  Technology for Caregivers Guide  Geriatric care manager links: [facility], [facility], and [facility]  Residential Care Home links: Jennifer Pan, and [facility]  Alz Assoc website  FCA website  Teepa Snow video info  Teepa Snow podcast site  Book recommendations and offers of complimentary copies  Activity ideas that could be facilitated by a paid caregiver - outings, [facility] Program, and taking to [facility] Adult Day program  Ideas to protect from scammers

## 2022-11-27 ENCOUNTER — PATIENT MESSAGE (OUTPATIENT)
Dept: SLEEP MEDICINE | Facility: CLINIC | Age: 87
End: 2022-11-27
Payer: MEDICARE

## 2022-11-27 DIAGNOSIS — G47.33 OSA (OBSTRUCTIVE SLEEP APNEA): Primary | ICD-10-CM

## 2022-11-27 DIAGNOSIS — Z78.9 INTOLERANCE OF CONTINUOUS POSITIVE AIRWAY PRESSURE (CPAP) VENTILATION: ICD-10-CM

## 2022-11-27 NOTE — PROCEDURES
[facility]  Sleep Center  Tel: [phone]    CPAP TITRATION           [id number]   Sex: Male Study Date: 2022   : 1935 Clinic #: [id number]   Age: 86 Referring Physician: ELHAM Jefferson MD   Height: 71.0 in Referring Physician #    Weight: 198.0 lbs Sleep Specialist:    B.M.I.: 27.6 Sleep Specialist #    Hypopnea rule: AASM 1B Scoring Tech: LOIS Carson   Total AHI: 15.7 Recording Tech PALMER Marquez   Lowest O2 sat: 88.0% Recording Location: [facility]     Sleep architecture: This is a CPAP titration study. At light's out, the patient fell asleep in 66.4 minutes. Sleep efficiency was 38.3%. Total sleep time (TST) was 183.0 minutes. Slow wave sleep proportion of TST was reduced and REM stage sleep proportion of TST was reduced. REM latency was N/A minutes.    Motor movement / Parasomnia: The total limb movement index was 119.4 (5.6 with arousal).    Cardiac: single lead EKG revealed normal sinus rhythm    CPAP titration:  Mask used in the study: F30  CPAP = 11 cwp was the first effective pressure.  Somewhat effective in lateral N2 sleep.  The patient had difficulty with the CPAP pressure and then was titrated on BiPAP.  BiPAP = 12/8 cwp was largely effective in lateral sleep.    LIMITATIONS: No effective supine pressure was seen.  No stage REM sleep was seen.  Occasional post-arousals centrals.    Oxygenation:  At therapeutic levels of PAP therapy, there was no baseline hypoxemia.    Impression:  -prolonged sleep onset and low sleep efficiency  -obstructive sleep apnea     Recommendations:      -BiPAP auto settings EPAP min = 8 cwp, IPAP max = 16 cwp, and PS = 4 cwp.  -the patient has follow up with Sleep Medicine        Trae Jefferson MD    (This Sleep Study was interpreted by a Board Certified Sleep Specialist who conducted an epoch-by-epoch review of the entire raw data recording.)  (The indication for this sleep study was reviewed  and deemed appropriate by AASM Practice Parameters or other reasons by a Board Certified Sleep Specialist.)        TABLES

## 2022-12-06 ENCOUNTER — TELEPHONE (OUTPATIENT)
Dept: SLEEP MEDICINE | Facility: CLINIC | Age: 87
End: 2022-12-06
Payer: MEDICARE

## 2022-12-06 ENCOUNTER — TELEPHONE (OUTPATIENT)
Dept: DERMATOLOGY | Facility: CLINIC | Age: 87
End: 2022-12-06
Payer: MEDICARE

## 2022-12-06 NOTE — TELEPHONE ENCOUNTER
----- Message from Asher Rod MA sent at 12/6/2022 11:11 AM CST -----  Contact: [phone]  Patient is calling to reschedule their appt.  Patient would like a call back at your earliest convenience.  The patient can be reached at  [phone].

## 2022-12-06 NOTE — TELEPHONE ENCOUNTER
----- Message from Asher Rod MA sent at 12/6/2022 11:19 AM CST -----  Contact: [phone]  Patient is calling to schedule appt for sleep pap machine.  the patient can be reached at [phone]

## 2022-12-06 NOTE — TELEPHONE ENCOUNTER
Spoke to patient in regards to the message we received. I informed the patient that his script was sent to [facility] on 11/27. I gave him the phone number to contact them to check on the status

## 2022-12-06 NOTE — TELEPHONE ENCOUNTER
----- Message from Ingrid Goldberg sent at 12/6/2022  2:24 PM CST -----  Regarding: Results  Contact: HU LOPEZ [[id number]]  Type:  Test Results    Who Called: Hu Lopez    Name of Test (Lab/Mammo/Etc): Sleep study   Date of Test: 11/12  Ordering Provider: Dr Jefferson   Where the test was performed: [facility]   Would the patient rather a call back or a response via [facility]? Call back   Best Call Back Number: [phone]  Additional Information:  He states he is not sure of next steps, also wants to know when will he receive his CPAP machine   Please advise

## 2022-12-08 ENCOUNTER — TELEPHONE (OUTPATIENT)
Dept: SLEEP MEDICINE | Facility: CLINIC | Age: 87
End: 2022-12-08
Payer: MEDICARE

## 2022-12-08 NOTE — TELEPHONE ENCOUNTER
----- Message from Giselle Davis sent at 12/8/2022  2:08 PM CST -----  Regarding: Self/  [phone]  Type: Patient Call Back    Who called:  Patient    What is the request in detail:  Patient is needing a call back regarding his appt and c pap machine.  Thank you    Would the patient rather a call back or a response via [facility]?  Call back    Best call back number: [phone]          Thank you

## 2022-12-12 ENCOUNTER — TELEPHONE (OUTPATIENT)
Dept: PRIMARY CARE CLINIC | Facility: CLINIC | Age: 87
End: 2022-12-12
Payer: MEDICARE

## 2022-12-12 NOTE — TELEPHONE ENCOUNTER
Called patient regarding moving his and his wife's appointment to a little later in the afternoon. Patient stated he was unsure if this would work as he has transportation set up through the home. Informed him we will send a car to pick him up and one that will take them back home. Patient verbalized understanding that the car will get there for 2:15 to pick them up for their 3:00 appointment.

## 2022-12-14 DIAGNOSIS — R32 URINARY INCONTINENCE, UNSPECIFIED TYPE: ICD-10-CM

## 2022-12-15 ENCOUNTER — TELEPHONE (OUTPATIENT)
Dept: PRIMARY CARE CLINIC | Facility: CLINIC | Age: 87
End: 2022-12-15
Payer: MEDICARE

## 2022-12-15 RX ORDER — MIRABEGRON 25 MG/1
25 TABLET, FILM COATED, EXTENDED RELEASE ORAL DAILY
Qty: 90 TABLET | Refills: 3 | Status: SHIPPED | OUTPATIENT
Start: 2022-12-15 | End: 2023-06-28

## 2022-12-15 NOTE — TELEPHONE ENCOUNTER
----- Message from Brown Coley sent at 12/15/2022  7:55 AM CST -----  Name Of Caller: Ekaterina        Provider Name: Sayda Rendon        Does patient feel the need to be seen today? no        Relationship to the Pt?: daughter        Contact Preference?: [phone]        What is the nature of the call?: Patient would like to reschedule his appointment that he missed on 12- at 1pm

## 2022-12-16 ENCOUNTER — TELEPHONE (OUTPATIENT)
Dept: SLEEP MEDICINE | Facility: CLINIC | Age: 87
End: 2022-12-16
Payer: MEDICARE

## 2022-12-16 NOTE — TELEPHONE ENCOUNTER
----- Message from Sayda Ernst sent at 12/16/2022 11:10 AM CST -----  Regarding: apt appt and cpap  Name of Who is Calling:JOSELINE APPIAH [[id number]]          What is the request in detail: Pt is calling to  his cpap machine. He thought he had an appt this month. He sounded confused when I told him he did not. Please c/b to let him know how he can get his machine. Thank you          Can the clinic reply by [facility]:          What Number to Call Back if not in [facility]: [phone]

## 2022-12-16 NOTE — TELEPHONE ENCOUNTER
Spoke to patient in regards to the message we received. He stated he is unsure of when his appointment is scheduled to  his machine. I gave him the number to E to contact them

## 2023-01-03 ENCOUNTER — TELEPHONE (OUTPATIENT)
Dept: PHYSICAL MEDICINE AND REHAB | Facility: CLINIC | Age: 88
End: 2023-01-03
Payer: MEDICARE

## 2023-01-03 ENCOUNTER — PATIENT MESSAGE (OUTPATIENT)
Dept: NEUROLOGY | Facility: CLINIC | Age: 88
End: 2023-01-03
Payer: MEDICARE

## 2023-01-03 NOTE — TELEPHONE ENCOUNTER
Appointment scheduled first available.  I have placed a reminder letter in the mail for him as well.

## 2023-01-03 NOTE — TELEPHONE ENCOUNTER
----- Message from Katerin Jurado sent at 1/3/2023 11:57 AM CST -----  Regarding: Advice  Contact: @ [phone] Maggy Ahn  Pt is calling to speak the nurse she has some questions about her father please call and adv @ [phone] Maggy Ahn

## 2023-01-03 NOTE — TELEPHONE ENCOUNTER
----- Message from Katerin Jurado sent at 1/3/2023 11:57 AM CST -----  Regarding: Advice  Contact: @ [phone] Maggy Ahn  Pt is calling to speak the nurse she has some questions about her father please call and adv @ [phone] Maggy Ahn

## 2023-01-04 ENCOUNTER — PATIENT MESSAGE (OUTPATIENT)
Dept: NEUROLOGY | Facility: CLINIC | Age: 88
End: 2023-01-04
Payer: MEDICARE

## 2023-01-11 DIAGNOSIS — F41.9 ANXIETY: ICD-10-CM

## 2023-01-12 RX ORDER — SERTRALINE HYDROCHLORIDE 100 MG/1
100 TABLET, FILM COATED ORAL DAILY
Qty: 90 TABLET | Refills: 3 | Status: SHIPPED | OUTPATIENT
Start: 2023-01-12 | End: 2023-12-14 | Stop reason: SDUPTHER

## 2023-01-13 NOTE — PROGRESS NOTES
Adherence packed for 28 days using Dispill:   (Compressed monthly packing)      Morning pockets:   Cerovite Multi-vitamin   Galantamine 4 mg   Losartan 50 mg   Metoprolol succinate ER 50 mg   Myrbetriq 25 mg   Omeprazole 40 mg   Sertraline 100 mg   Vitamin B12 1,000 mcg   Vitamin D3 5,000 IU      Evening pockets:   Atorvastatin 40 mg   Galantamine 4 mg   Melatonin 5 mg   Montelukast 10 mg       *Sent to [facility] via same-day delivery.

## 2023-01-17 ENCOUNTER — OFFICE VISIT (OUTPATIENT)
Dept: NEUROLOGY | Facility: CLINIC | Age: 88
End: 2023-01-17
Payer: MEDICARE

## 2023-01-17 DIAGNOSIS — F02.A18 OTHER MILD FRONTOTEMPORAL DEMENTIA WITH OTHER BEHAVIORAL DISTURBANCE: Primary | ICD-10-CM

## 2023-01-17 DIAGNOSIS — G31.9 NEURODEGENERATIVE COGNITIVE IMPAIRMENT: ICD-10-CM

## 2023-01-17 DIAGNOSIS — F02.80 MIXED DEMENTIA: ICD-10-CM

## 2023-01-17 DIAGNOSIS — G30.9 MIXED DEMENTIA: ICD-10-CM

## 2023-01-17 DIAGNOSIS — F01.50 MIXED DEMENTIA: ICD-10-CM

## 2023-01-17 DIAGNOSIS — G47.00 INSOMNIA, UNSPECIFIED TYPE: ICD-10-CM

## 2023-01-17 DIAGNOSIS — G20.C PARKINSONISM, UNSPECIFIED PARKINSONISM TYPE: ICD-10-CM

## 2023-01-17 DIAGNOSIS — G47.33 OSA (OBSTRUCTIVE SLEEP APNEA): ICD-10-CM

## 2023-01-17 DIAGNOSIS — G31.09 OTHER MILD FRONTOTEMPORAL DEMENTIA WITH OTHER BEHAVIORAL DISTURBANCE: Primary | ICD-10-CM

## 2023-01-17 PROCEDURE — 99215 PR OFFICE/OUTPT VISIT, EST, LEVL V, 40-54 MIN: ICD-10-PCS | Mod: 95,,, | Performed by: PSYCHIATRY & NEUROLOGY

## 2023-01-17 PROCEDURE — 99215 OFFICE O/P EST HI 40 MIN: CPT | Mod: 95,,, | Performed by: PSYCHIATRY & NEUROLOGY

## 2023-01-17 NOTE — PROGRESS NOTES
[facility]     PATIENT: Hu Lopez  VISIT DATE: 2023  MRN: [id number]  PRIMARY PROVIDER: Sayda Rendon MD  : 1935       Chief complaint: Progressive Cognitive Impairment     History of present illness:      Mr. Lopez is a 87-year-old right-handed male who presents today to the [facility] due to concerns related to Progressive Cognitive Impairment.  Mr. Lopez is accompanied by daugthers who participates in providing history.  Additional information is obtained by reviewing available medical records.     Relevant Background/Context  Known Relevant Family history:  Mother  late 80s/early 90s  Father  70s with bladder cancer  Neurocognitive Disorder:  The family denies a history of early/late onset cognitive impairment.  Movement Disorder:  The family denies a history of movement disorder (PD, PDD, tremor, etc).  Motorneuron Disorder:  The family denies a history of motor neuron disease (ALS).  Developmental Disorder:  The family denies a history of developmental learning disorder (Dyslexia, ADHD, ASD, etc.).  Psychiatric Disorder:  Paternal Uncle - schizophrenia?/mood NOS  Known Relevant Genetics:  There is no known relevant genetic testing available.  Developmental Milestones:  The patient/family report no known birth complications or early life problems. The patient met all developmental milestones.  Education/Learning Capacity:  The patient/family report no signs or symptoms suggestive of developmental learning disorder.  SHAILESH.  RHEA Tidwell  Estimated Educational Experience: 16 years of formal education.  Social History:  Born in [place]. Moved to [place] at age 8 then back to [place] where completed HS. [place]oved to [place] in late 20s  Career/Skill Reserve:   - retired early   Retired/Quit: 0  Relevant Medical History:  Primary osteoarthritis of left knee  PARVEEN since  "childhood  HTN  HLD  Urinary incontinence     Neurocognitive Disorder Features  Onset/Duration:  Aug 2017 (~5-year)  First Symptom:  Behavior/Psychiatric impairment  Progression:  Gradually Progressive  Clinical Course:  Psychiatrist (08/16/2017)  Type: Chart Review. History of anxiety who presents for his family meeting. The patient has been seen in Internal Medicine on 6/15/17 and 7/24/17. He was started on Zoloft 25mg daily at 6/15/17 appointment with plan to increase to 50mg daily after 1 week. At follow up appointment on 7/24/17, he says he started the medication but then stopped because he felt he was urinating more often (had also stopped taking Oxybutynin around that time). The patient reports a history of anxiety but does not feel it has caused problems. Describes anxiety as "feels like things need to be quicker," especially when he worked as an . States he would occasionally get upset if things did not go a certain way. his family reports this was a big issue. his family states he worries and catastrophizes constantly "about everything", especially about his health. They feel the anxiety is so severe that is is causing the entire his family to be more "anxious and on edge. " They feel it is negatively affecting how the family functions and relates. The patient describes frequent worrying since he was a child. Also states that when he was young he would wash his hands over and over until his skin was raw. Says he does not do this anymore. But does have particular fears of germs (i. E. Will not drink from the wine cup at [employer] or let any of his family members do so). He states he checks locks every night before he goes to bed but very rarely rechecks (only when going out of town). his family states he turns the hot water heater and electricity off when he goes out of town - they disagree about whether this is logical. his family describes anxiety as interfering from his ability to make decisions, " especially in regard to spending money. The patient feels many of his traits are from his [place] culture. Denies symptoms of depression, including depressed mood, anhedonia, worthlessness, hopelessness, low energy, difficulty concentrating, changes in appetite/weight, or psychomotor agitation/retardation. Reports chronically poor sleep due to noncompliance with CPAP. Denies history of jamie, psychosis, SI, or HI.  .  [facility] - Óscar Miller MD. Neurologist (08/31/2022)  Type: Chart Review. The patient presents with his daughter and his wife. his daughters primary historian. Additional history is gathered from review of previous medical records. his family report a lifetime history of mild bother some untreated anxiety since childhood. Symptoms are otherwise not terribly bothersome and noninvasive. 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 The patient was otherwise in her normal state health until at minimum 2017 when the patient began treatment for unspecified anxiety NOS. The patient was started on Zoloft intact gradually titrate up to 100 mg. Is unclear when for the patient 1st began describing new cognitive impairment however his family recognized a G sharp decline in behavior and cognition in May 2021. Due to COVID related restrictions his family had minimal contact with the patient and his wife between 2019 and May 2021. Following patient's series of vaccines his family began the recognized both him and his wife for doing poorly cognitively. In spring of 2021 of his family recognized that their home had fallen into disarray. There was damage to the walls and rat feces in the house. It was at this time his family recognized patient's his wife with developing severe cognitive impairment and with the later diagnosed with dementia. Patient's symptoms however were for more mild. Though he  was more forgetful and limited insight into his wife's condition his family is concerns at this time morbid predominantly behavioral. The patient was repeating himself, forgetting conversations however his prior behavioral tendencies had magnified. Previous obsessive tendencies had worsened. his family recollect finding him putting various objects in boxes did not make sense. He would stack boxes within boxes and have atypical ordering of household appliances. Previous obsessive compulsive tendencies such as checking doors and locks became more dogmatic and ritualistic. Patient's behavioral side became more disinhibited. his family recollect him speaking openly about fat people in disparaging matter in public which was a change from his behavior previously. Furthermore his family recollect on 1 occasion he took a photo of his wife all changing which was also a change in his baseline temperament. his family deny any spicules specific apathy, inertia, empathy, sympathy, or eating related changes. his family deny any awareness of any specific language changes at this time however the report intermittent confusion as to commands or statements. They are uncertain whether patient's forgetting or having difficulty understanding certain conversation topics. The patient has gotten lost on more than 1 occasion however stopped driving in 21 due to progressive vision loss. his family denies any specific motor changes however do report he is walking slower with frequent falls and balancing problems. Due to progressive vision and hearing loss in 2021 the patient moved to [place] for additional care and surgery. 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 While in [place] the patient was evaluated by [facility] memory care center and diagnosed with dementia a likely due to Alzheimer's. The patient presents today  requesting 2nd opinion and to be established with regional neurology team. On presentation the patient is present inappropriate with moderate degree of hearing loss and limited insight into condition and situation. The observations made above were discussed with the patient and his family. The patient presents reporting a minimum 1-year history of idiopathic progressive multi-domain cognitive impairment; however, behavioral symptoms are likely worsening over the past five years without clear and demonstrable interference with activities of daily living. Initial symptoms recognized by his family were behavioral disturbances, specifically an increase in prior tendencies, including obsessive-compulsive, ritualistic, simple motor tics, perseverative behavior,  and mild amnesia. Over the last year, symptoms have progressed. The patient was recently evaluated by an outside hospital in [place]. Records are not available for review. The diagnosis was late-onset amnestic dementia. Neurocognitive assessment performed today shows executive predominant multi-domain mild cognitive impairment. Neurological examination is consistent with asymmetric right greater than left parkinsonism, gait instability, motor apraxia, dyskinetic movements, with mild semantic aphasia. Imaging provided from the outside hospital shows evidence of bilateral anterior temporal atrophy with relatively pronounced medial prefrontal cortical atrophy with regional atrophy of the anterior cingulate bilateral with mild left greater than right cuneus atrophy. 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 The majority of the patient's most prominent symptoms are localized to the prefrontal/anterior temporal region. This correlates well with available brain imaging. Given the patient's advanced age, the most likely underlying pathology is mixed,  including but not limited to Alzheimer's disease-related pathology and TDP43 proteinopathy. Primary concerns at this time or falls. The patient has gait instability pronounced during evaluation. The patient nearly falls during simple walking. Recommend evaluation by neuro-physical therapy team with consideration for Parkinson's specific physical therapy. The patient has ongoing insomnia with CPAP, not interference. Will refer to Sleep Medicine to get a CPAP machine. Start Belsomra 10 mg for insomnia. Recommend EKG before starting acetylcholine esterase inhibitor.  [facility] - Óscar Miller MD. Neurologist (09/27/2022)  Type: Chart Review. Since last time seen, serum laboratories were drawn. 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 Serum laboratories were otherwise unremarkable aside from elevated neurofilament light chain which suggests cns degeneration. Mri brain from outside hospital was reviewed. 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 These findings were discussed with the family. Primary concerns at this time include the patient wellness. his family is in the process of relocating the patient to a home. Due to cognitive impairment and difficulty with decision making the patient has been very paranoid regarding his savings. his family has requested support for durable power of  financial on all the help in decision making process for the code localization of both the patient and his wife into a long-term home with adequate care support. The patient is currently living in [facility] however is unclear how long the be able to afford live there. We will get his family situated with [facility] system for care management planning. We provided links to local elder care attorneys. The observations made above were discussed with the patient and his family. There are no problems behavior at this time. Medications are being optimized. Oxybutynin has been stopped. his  family are having difficult time affording Belsomra in bed trick. We will facilitate with necessary coupons and prior authorization. Will get his family set up with [facility] system for care planning. We provided list of elder law  is in the area.  Specialist: sleep (11/03/2022)  Type: Chart Review. Hu Lopez is a pleasant 86 y. O. Male with PMH significant for CALVIN, anxiety, dementia who presents for evaluation and treatment of previously diagnosed CALVIN. Has not been using cpap recently, has a very old machine but reports significant improvement in sleep when using. Not working since then. Just moved to [place], staying at [facility].     Current Presentation  Recent/Interim History:  Since last time seen, patient's symptomatology has improved. The patient is tolerating galantamine 5 mg and Belsomra 10 mg. Patient's sleep is otherwise largely well controlled. his family in the process of moving the patient out of skilled nursing facility to [facility] with 24 hour support. Transition is happening relatively smoothly. We provided additional care resources in social work management make this transition he is here. All documentation has been signed the patient is doing well. The patient is largely sleeping through the night however does have intermittent constipation with the need for p. R. N. Enema as. We have requested more information. Will likely need to increase patient's prophylactic medications for constipation. The patient is currently taking MiraLax. Do not recommend p. R. N. The patient is otherwise doing well needing minimal daily support. Patient's his daughters have taken over finances and become durable power-of- medical legal. No other concerns at this time. his family have requested additional documentation patient's his wife skilled nursing facility. Happy to oblige. No additional medication adjustments at this time.  Unresolved Concern(s) reported by  patient/family:  Gait instability/parkinsonism - referral to PT  Insomnia/CALVIN -referral to sleep medicine + belsomra  Prodromal dementia - start ACheI  Hearing loss  Vision Loss     Review of cognitive, visuospatial, motor, sensory, and behavioral systems:     Memory:   The patient's memory has worsened in the past few years.  He does repeat statements or asks the same question repeatedly.  He does have difficulty remembering recent important conversations.  He does have difficulty remembering recent events.  He does forget information within minutes.  His recent retrograde memory is intact.  His remote memory is intact.  Attention:   The patient's attention and concentration are impaired.  He does not have attentional fluctuations.  He does have difficulty with selective attention.  He does become easily distracted.  He does have difficulty with divided attention.  Executive:   The patient's cognitive processing speed is slower.  He does have difficulty with working memory.  He does misplace personal items (e.g., keys, cell phone, wallet) more frequently.  He does have difficulty keeping track of his medications.  He does have difficulty with planning/organizing/completing multistep tasks.  He does have difficulty with executive attention.  He does not have difficulty with flexible thinking.  He does not difficulty with self control.  He denies new impulsivity or rash/careless actions.  His judgment is impaired.  Language:   The patient's speech is affected.  He does not forget people's names more frequently.  He does not have word-finding difficulties.  His speech is fluent and non-effortful.  His speech is grammatically intact.  He does not make word substitutions.  He does have difficulty reading.  He appears to have impaired comprehension.  Visuospatial:   The patient has new visuospatial problems.  He has become confused or disoriented in *new*, unfamiliar places.  He does have trouble navigating.  He does not  have visuospatial disorientation.  He does not have difficulty recognizing objects or faces.  He denies problems with driving or parking.  Motor/Coordination:   The patient does have difficulty with walking.  He does feel imbalanced.  He has fallen.  He reports new muscle weakness.  He does have difficulty buttoning shirts, operating zippers, or manipulating tools/utensils.  His handwriting has not become micrographic.  He does not have a resting tremor.  He denies having any new involuntary movements and/or muscle jerking.  He does not have swallowing difficulty.  He denies new muscle cramps and twitching.  Sensory:   The patient denies new numbness, tingling, paresthesias, or pain.  The patient reports new loss of vision, blurry vision, and/or double vision.  The patient reports a recent loss of hearing and/or worsening tinnitus.  The patient does have anosmia.  Sleep:   The patient reports difficulty sleeping.  The patient does have difficulty going to sleep.  The patient reports difficulty staying asleep and/or frequently awakening at night.  The patient does snore and/or have witnessed apneas while sleeping.  When he wakes up in the morning, he does not feel well-rested.  He denies dream-enactment behavior.  He denies symptoms suggestive of restless leg syndrome.  Behavior:   The patient's personality has changed.  He does have symptoms of disinhibition and social inappropriateness.  He is exhibiting symptoms to suggest a loss of manners and/or decorum.  He does not appear apathetic or has decreased motivation.  He does not appear to have a change in inertia.  There is no report that The patient has had a change in their emotional expression.  He does not have emotional blunting or lability.  He does have symptoms of irritability and mood lability.  He does not have symptoms of agitation, aggression, or violent outbursts.  His insight into his disease and situation is impaired.  His personal hygiene has become  impaired.  He is not exhibiting a diminished response to other people's needs and feelings.  He is not exhibiting a diminished social interest, interrelatedness, or personal warmth.  He denies restlessness.  He does have new and/or worsening simple repetitive behaviors.  His speech has not become simplified or become repetitive/stereotyped.  He reports symptoms that are suggestive of new/worsening complex repetitive/ritualistic compulsions and behaviors.  He does not have symptoms of hyper-religiosity or dogmatism.  His interests/pleasures have not become restrictive, simplified, interrupting, or repetitive.  He denies a change of self-stimulating behavior.  He denies any changes in eating behavior.  He denies increased consumption of food or substances.  He denies oral exploration or consumption of inedible objects.  Psychiatric:   He does feel depressed.  He is exhibiting symptoms of social withdrawal/indifference.  He does have anxiety.  He does not exhibit cycling behavior.  He does not exhibit hyperactive behavior.  He is not exhibited symptoms of paranoia.  He does not have delusions.  He does not have hallucinations.  He does not have a history of sensitivity to neuroleptic/psychotropic medications.  Medical Review of Systems:   The patient does have constipation.  The patient does have urinary incontinence.  The patient reports symptoms that are suggestive of orthostatic lightheadedness.  The patient's weight is stable.  Functional status:  Difficulty performing the following Instrumental ADLs:  Housekeeping: No Comment: unclear given comorbidities  Food Preparation: No  Shopping: No  Ability to Handle Finances: No  Transportation/Driving: Yes Comment: due to vision loss  Household Appliances/Stove: No Comment: unclear given comorbidities  Laundry: No Comment: unclear given comorbidities  Difficulty performing the following Basic ADLs:  Dressing: No  Bathing: No  Toileting: No  Personal hygiene and grooming:  No  Feeding: No  Care Management:  Patient/Family Safety Concerns:  Medication Adherence: No  Home Safety: No  Wandered: No  Firearms: No  Fall Risk: No  Home Alone: No          Past Medical History:   Diagnosis Date    Allergy     Basal cell carcinoma     BPH (benign prostatic hyperplasia)     CAD (coronary artery disease)     Cataracts, bilateral     Diverticulitis     DVT (deep venous thrombosis)     Elevated PSA     GERD (gastroesophageal reflux disease)     Glaucoma     Hyperlipidemia     Hypertension     Prostate cancer     Skin cancer     Sleep apnea     CPAP       Past Surgical History:   Procedure Laterality Date    APPENDECTOMY      COLON SURGERY      75,05    COLONOSCOPY      11    COLOSTOMY      PROSTATE SURGERY  2008    REVISION COLOSTOMY      SKIN CANCER EXCISION      chin    UPPER GASTROINTESTINAL ENDOSCOPY      11       Family History   Problem Relation Age of Onset    Prostate cancer Father     Colon cancer Paternal Uncle     Stroke Paternal Uncle     Depression Paternal Uncle     No Known Problems Mother     Heart attack Brother 70       Social History     Socioeconomic History    Marital status:    Tobacco Use    Smoking status: Never    Smokeless tobacco: Never   Substance and Sexual Activity    Alcohol use: Yes     Alcohol/week: 0.0 standard drinks     Comment: rare    Drug use: No    Sexual activity: Never   Social History Narrative    3 children - healthy.       Medication:     Current Outpatient Medications on File Prior to Visit   Medication Sig Dispense Refill    atorvastatin (LIPITOR) 40 MG tablet Take 1 tablet (40 mg total) by mouth once daily. 30 tablet 11    desonide (DESOWEN) 0.05 % cream       dextran 70-hypromellose (TEARS) ophthalmic solution Place 1 drop into both eyes as needed. 30 mL 6    galantamine (RAZADYNE) 4 MG Tab Take 1 tablet (4 mg total) by mouth 2 (two) times daily with meals. 60 tablet 11    iron-vitamin C 100-250 mg, ICAR-C, 100-250 mg Tab Take 1 tablet by  mouth once daily. for 90 days 90 tablet 3    losartan (COZAAR) 50 MG tablet Take 1 tablet (50 mg total) by mouth once daily. 90 tablet 3    metoprolol succinate (TOPROL-XL) 50 MG 24 hr tablet Take 1 tablet (50 mg total) by mouth once daily. 30 tablet 11    mirabegron (MYRBETRIQ) 25 mg Tb24 ER tablet Take 1 tablet (25 mg total) by mouth once daily. 90 tablet 3    montelukast (SINGULAIR) 10 mg tablet Take 1 tablet (10 mg total) by mouth every evening. 30 tablet 11    multivit-min-FA-lycopen-lutein (CEROVITE SENIOR) 0.4 mg-300 mcg- 250 mcg Tab Take 1 tablet by mouth once daily. 90 tablet 3    omeprazole (PRILOSEC) 40 MG capsule Take 1 capsule (40 mg total) by mouth every morning. 30 capsule 11    polyethylene glycol (GLYCOLAX) 17 gram PwPk Take 17 g by mouth once daily. 30 packet 11    PROCTOFOAM HC rectal foam USE ONE APPLICATIORFUL RECTALLY TWICE DAILY (Patient not taking: Reported on 8/31/2022) 10 g 3    sertraline (ZOLOFT) 100 MG tablet Take 1 tablet (100 mg total) by mouth once daily. 90 tablet 3    suvorexant (BELSOMRA) 10 mg Tab Take 1 tablet by mouth every evening. 30 tablet 3    triamcinolone acetonide 0.025 % Lotn Apply 1 application topically 2 (two) times daily. 60 mL 0    [DISCONTINUED] multivitamin (THERAGRAN) per tablet Take 1 tablet by mouth once daily. 90 tablet 3     No current facility-administered medications on file prior to visit.        Review of patient's allergies indicates:   Allergen Reactions    Codeine Nausea And Vomiting    Simcor [niacin-simvastatin] Rash    Simvastatin Rash       Medications Reconciliation:   I have reconciled the patient's home medications and discharge medications with the patient/family. I have updated all changes.  Refer to After-Visit Medication List.    Objective:  Vital Signs:  There were no vitals filed for this visit.  Wt Readings from Last 3 Encounters:   11/03/22 1046 89.8 kg (198 lb)   08/31/22 0903 89.6 kg (197 lb 8.5 oz)   08/25/22 1144 90 kg (198 lb 6.6  "oz)     There is no height or weight on file to calculate BMI.      Neurological examination:    Mental Status:   His appearance is normal (hygiene is appropriate; attire is proper and clean).  Throughout the interview, he is cooperative, his eye contact is appropriate.  The patient's energy level is abnormal.  His orientation is normal; Spatial 5/5 (location, the floor of building, city, county, state) and temporal 5/5 (month, day, year, XAVIER) dimensions are accurate.  He has appropriate attention/concentration (NIKKI/XAVIER forwards and backward).  He can complete three-step commands.  His fund of knowledge was appropriate for age, culture, and level of education.  His thought process is not logical or goal-oriented.  He demonstrated impaired insight based on actions, awareness of his illness, plans for the future.  He demonstrated good judgment based on actions and plans for the future.  He has no evidence of hallucinations (auditory, visual, olfactory).  He has no evidence of delusions (paranoid, grandiose, bizarre).  Cranial Nerves:   His facial expression was abnormal.  His facial expression was restricted suggestive of hypomimia.  His facial sensation was intact to light touch bilaterally.  His hearing was diminished.  He does require the use of hearing aids.  His oropharynx was non-obstructed with a Mallapati score 1/4. The soft palate elevates symmetrically.  His tongue showed no evidence of scalloping.  He can protrude their tongue beyond His lips for >10 sec.  Speech/Language:   The patient's speech was not completely fluent and non-effortful.  His speech volume is within normal range and appropriate to the context.  His speech rate is abnormal.   Comment: slow; slow  His respirations are within normal range and appropriate to context.  His speech timbre is normal.  He made articulation (segmental features) errors.  He has no speech dysdiadochokinesia with repetition of syllables such as "/PA/, /TA/, /KA/, " "/OM/".  He made no errors during the repetition of rapid syllables and or words such as "caterpillar" "", and "huckleberry"  He has no repetition errors of rapid sequences of consonants, such as in "[employer]" or "[employer]".  He has no prosody (suprasegmental features) errors.  The patient's speech is not dysarthric.  The patient showed evidence of anomia.  Motor:   The patient's bilateral upper extremity muscle tone has no evidence of paratonia.  Assessment of motor strength showed evidence of abnormal weakness.   Comment: mild  weakness bilaterally ; mild  weakness bilaterally  There is no pronator or downward drift.  There is no myoclonus observed in The patient's bilateral upper and lower extremities.  There are no fasciculations observed in The patient's bilateral upper and lower extremities.  Coordination:   He has no bilateral upper extremity limb dysmetria or past pointing on finger-nose-finger bilaterally.  He has no limb dysdiadochokinesia of the upper extremity on the pronation/supination test and screwing in a light bulb or lower extremity during tapping ball of each foot bilaterally.  He has no visible tremor.  He has evidence of interhemispheric motor control deficits.  He demonstrates evidence of motor overflow.  He demonstrates no alien limb phenomena.  He has dyskinetic movements.  He has no akathisia.  He has no tardive dyskinesia.  The patient's upper extremity fine motor coordination was abnormal.   Comment: mild R>L; mild R>L  The patient's upper extremity fine motor coordination was not slow.   Comment: finger tapping, pronation/supination, and the open-close fist was slow.; finger tapping, pronation/supination, and the open-close fist was slow.  The patient's upper extremity fine motor coordination was not hypometric.   Comment: finger tapping, pronation/supination, and the open-close fist showed hypometria.; finger tapping, pronation/supination, and " the open-close fist showed hypometria.  The patient's upper extremity fine motor coordination was not dysrhythmic.   Comment: finger tapping, pronation/supination, and the open-close fist showed dysrhythmia.; finger tapping, pronation/supination, and the open-close fist showed dysrhythmia.  Higher Cortical Function:   The patient showed no evidence of simultanagnosia (Navon hierarchical letters).  He demonstrates no evidence of dorsal simultanagnosia (overlapping objects).  He demonstrates no evidence of ventral simultanagnosia (complex picture synthesis).  The patient showed evidence of visuospatial constructional dysfunction.   Comment: very mild; very mild  The patient showed no evidence of agnosia.  The patient showed evidence of angular gyrus disconnection (insular-operculum).  He has left-right confusion.  He has evidence of dysgraphia.  The patient showed evidence of apraxia.  He showed no evidence of ideomotor apraxia performing tool-use pantomimes (e.g., use a hammer, use a screwdriver, use a comb, flip a coin, waving goodbye).  He showed no evidence of ideational apraxia (e.g., taking off and putting on shoes, folding paper into an envelope).  He showed evidence of limb-motor apraxia during mimicking complex bimanual hand shapes.  He showed no evidence of buccofacial apraxia (e.g., blow out a candle, puff out cheeks, and whistle).  He showed dysexecutive behavior.  He showed no utilization or imitation behavior.  He has evidence of perseverative or stereotyped behavior.  He has no stimulus-bound behavior.      Neuropsychological Evaluation Summary:  Prior Neurocognitive/Neurobehavioral Evaluation(s)  No Prior Testing Available  2022-08-31:  Executive predominant multidomain mild cognitive impairment  Moderate Memory Impairment: He scored >3 standard deviations below the norm on at least one measure. He had difficulty with encoding, attention, recall. He had a incomplete learning curve. His free recall was  significantly impaired by time.  Moderate Executive Impairment: working memory, lexical fluency.  Mild Language Impairment: semantic association, Nonfluent, Semantics.  Very Mild Visuospatial Impairment: visuospatial construction.  MMSE 26/30: MMSE Score suggestive of normal to questionable cognitive impairment.  MOCA 24/30: MOCA Score suggestive of mild cognitive impairment.  BEHAV5+ 3/6: See ROS section for a full description  2022-09-27:  Executive predominant multidomain major cognitive impairment  Neurocognitive/Neurobehavioral Evaluation completed on 2023-01-17    Neuropsychiatric/Behavioral Focused Evaluation Assessment    BEHAV5+ 3/6 See ROS section for a full description   Laboratories:     Lab Date Value [Reference]   Metabolic Screening           HDL Particle Number 2022, Aug-31    24.8 (L) [>=33.0 umol/L]      HDL Size 2022, Aug-31    8.5 (L) [>=8.9 nm]      Large VLDL Particle Number 2022, Aug-31    1.8      LDL Particle Number by NMR 2022, Aug-31    1032      LDL Particle Size 2022, Aug-31    20.8 [>=20.7 nm]      Lipids, HDL Cholesterol 2022, Aug-31    34 (L) [40 - 59 mg/dL]      Lipids, LDL Cholesterol 2022, Aug-31    75      Lipids, Total Cholesterol 2022, Aug-31    139      Lipids, Triglycerides 08/31/2022  152 (H) [30 - 149 mg/dL]      Methlymalonic Acid 2022, Aug-31    0.22      Small LDL Particle Number 08/31/2022  644 (H)      T4 Total 08/31/2022  7.0 [4.5 - 11.5 ug/dL]      TSH 2022, Aug-31    1.779 [0.400 - 4.000 uIU/mL]      VLDL Size 2022, Aug-31    46.8 (H)      Glucose 2022, Aug-31    117 (H) [70 - 110 mg/dL]      Albumin 2022, Aug-31    4.0 [3.5 - 5.2 g/dL]      Alkaline Phosphatase 2022, Aug-31    73 [55 - 135 U/L]      ALT 2022, Aug-31    21 [10 - 44 U/L]      AST 2022, Aug-31    21 [10 - 40 U/L]      BILIRUBIN TOTAL 2022, Aug-31    0.7 [0.1 - 1.0 mg/dL]      PROTEIN TOTAL 2022, Aug-31    6.8 [6.0 - 8.4 g/dL]      Cholesterol 2022, Aug-25    142 [120 - 199 mg/dL]      HDL 2022,  Aug-25    42 [40 - 75 mg/dL]      Non-HDL Cholesterol 2022, Aug-25    100 [mg/dL]      Triglycerides 08/31/2022  87 [30 - 150 mg/dL]      Folate 08/31/2022  9.5 [4.0 - 24.0 ng/mL]      Thiamine 08/31/2022  58 [38 - 122 ug/L]      Vitamin B-12 2022, Aug-31    >1400 (H) [180 - 914 ng/L]      Cerebrospinal Fluid Assessment   IgG 08/31/2022  823 [650 - 1600 mg/dL]      Neuroendocrine/Electrolyte Screening   Magnesium 2022, Aug-31    2.1 [1.6 - 2.6 mg/dL]      BUN 2022, Aug-31    28 (H) [8 - 23 mg/dL]      Chloride 2022, Aug-31    104 [95 - 110 mmol/L]      Creatinine 2022, Aug-31    1.8 (H) [0.5 - 1.4 mg/dL]      Potassium 2022, Aug-31    4.3 [3.5 - 5.1 mmol/L]      Sodium 2022, Aug-31    137 [136 - 145 mmol/L]      Standard Hematology Screen   Hematocrit 2022, Aug-31    39.2 (L) [40.0 - 54.0 %]      Hemoglobin 2022, Aug-31    13.5 (L) [14.0 - 18.0 g/dL]      MCV 2022, Aug-31    94 [82 - 98 fL]      Platelets 08/31/2022  194 [150 - 450 K/uL]      Infectious Disease/Immunocompromised Screening   HIV 1/2 Ag/Ab 08/31/2022  Non-reactive      Syphilis Treponemal Ab 2022, Aug-31    Nonreactive [Nonreactive]           Neuroimaging:    MRI brain/head without contrast on 5/29/2022  Formal interpretation by Radiology:  not available  Independently reviewed radiological imaging by Óscar Leahy MD. MPH. 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 severe b/l hippocampal atrophy. No significant CC or brainstem atrophy. mild to moderate posterior cortical atrophy.  T2/FLAIR: No Significant hyperintensities appreciated on MRI T2/FLAIR  DWI/ADC: No Significant DWI hyperintensities/hypointensities. No ADC correlation.  SWI/GRE: No Significant hypointensities to suggest cortical/subcortical hemosiderin deposition.  Impression: : FTLD R>L with moderate degree of background generalized atrophy     Procedures:    Electrocardiogram on 6/4/2012  Formal interpretation:  Vent. Rate : 063 BPM      Atrial Rate : 063 BPM    P-R Int : 200 ms          QRS Dur : 084 ms     QT Int : 396 ms       P-R-T Axes : 036 010 045 degrees    QTc Int : 405 ms Normal sinus rhythm Normal ECG  Independently reviewed Electrocardiogram by Óscar Leahy MD. MPH. Behavioral Neurologist  Impression: : Received ECG has no evidence of sinus node disease. HR (>=50-60). Prolonged SC interval (>0.22 s). Broad QRS complex (> 0.12 s).     Clinical Summary:     Mr. Lopez is a 87-year-old right-handed male with a relevant past medical history of HTN, HLD, CALVIN on CPAP, insomnia, PARVEEN, hearing loss, visual impairment, who presents reporting a 5-year history of gradually progressive neurocognitive impairment.       The clinical history is suggestive of:  Memory Impairment: STM encoding impairment, LTM encoding-retrieval impairment, Amnesia of fixation  Attention Impairment: Attention, Selective attention, Sustained attention, Shifting attention  Executive Impairment: Energization, Working Memory, Set-Shifting, Response Inhibition  Language Impairment: Language Dysfunction, Logopenic Dysfunction, Receptive Dysfunction  Visuospatial Impairment: Allocentric Spatial Processing  Motor/Coordination Impairment: Sensory motor integration, Motor weakness  Sensory Impairment: Sensory Deprivation, Limbic Dysfunction  Behavior Impairment: Emotional Regulation, Self-Preservation Dysregulation, Sensorimotor Dysregulation  Psychiatric Impairment: Neurovegetative, Social Coherence, Signal-Noise Dysregulation  Medical Review of Systems Impairment: Autonomic Dysfunction  iADL Impairment: Selden Instrumental Activities of Daily Living Scale  The neurological examination is significant for:  Cerebellar Dysfunction: truncal ataxia (sitting, walking)  Cortical Frontal Dysfunction: non-fluent aphasia (fluency), agrammatic aphasia (syntax comprehension)  Cortical Frontal-Parietal Disconnection: apraxia (limb-motor)  Cortical Temporal Dysfunction: anomic aphasia  (spontaneous, confrontational)  Cortical Temporal-Parietal Dysfunction: left-right confusion, dysgraphia  Cortical Transcallosal Disconnection: interhemispheric motor control (interhemispheric motor control ), motor efference (motor overflow)  Executive Impairment: thought disorder, dysexecutive behavior (perseverative/stereotyped)  Motor Coordination: gait imbalance (tiptoes)  Motor Dysfunction: muscle atrophy  Movement Disorder (Gait): strength (difficulty rising), abnormal features (abnormal posture, initiation/inhibition, stance, speed, stride/cycle, abnormal swing, difficulty turning), gait syndrome (rigid-akinetic)  Movement Disorder (Hyperkinetic): dyskinetic movements  Movement Disorder (Hypokinetic): parkinsonism (diminished facial expression, diminished facial expression, tone, bradykinesia), dyskinesia (slowing, hypometria, dysrhythmia)  Movement Disorder (Speech): abnormal vocal features (rate, articulation)  Sensory Dysfunction: hearing (diminished, hearing aides), peripheral (A? fibers)  The neurocognitive battery is significant (based on age and education) for:  Executive predominant multidomain major cognitive impairment  BEHAV5+ 3/6: See ROS section for a full description  The neurologically relevant imaging is significant for  MRI brain/head without contrast (5/29/2022): FTLD R>L with moderate degree of background generalized atrophy        Assessment:        Mr. 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     Mr. Hickmans clinical presentation meets the criteria for Mild Cognitive Impairment (MCI-ADRC) (Gigi MS, et al. 2011 Alzheimer's & Dementia).     Concern regarding an intraindividual change in cognition  Impairment in one or more cognitive domains  Preservation of independence in functional abilities.  Not  demented    Mr. Lopez's clinical presentation is suggestive of FTLD NOS as they have features of both Behavioral variant Frontotemporal Dementia (bvFTD) (Rasdipti et al., Brain 2011) and Semantic variant Primary Progressive Aphasia (svPPA) (Hilary ML, et al. Neurology. 2011) without meeting criteria for either.      Early behavioral disinhibition: socially inappropriate behavior, loss of manners or decorum, impulsive, rash or careless actions  Early perseverative, stereotyped, or compulsive/ritualistic behavior: simple repetitive movements, complex, compulsive or ritualistic behaviors, stereotypy of speech.  On neuropsychological testing: deficits in executive tasks with relative sparing of episodic memory and visuospatial skills.  Impaired confrontation naming.  Impaired object knowledge, particularly for low-frequency or low-familiarity items.  Spared repetition.  Spared speech production (grammar and motor speech).  Predominant medial prefrontal/anterior temporal lobe atrophy.     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  Given patient's advanced age, the pathology underlying Mr. Hickmans neurocognitive impairment is likely a mixture of pathologies (Alzheimer's Disease Related Pathology, TAR DNA-binding protein 43).    The observations made above were discussed with the patient and his family. Patient is doing well on current medication regimen. Recommend following up with Sleep Medicine. Continue home PT and transition into condominium with 24 hour support. his family have no other concerns at this time. No new behavioral concerns. Continue all medication as previous   prescribed.        Care Management Plan:     #Neurocognitive Disorder Treatment:  Continue Razadyne 4 mg  Continue Mybetriq 25 mg   F/U  care ecosystem  Continue MIND Diet  No indication for memantine at this time  Continue Zoloft 100mg  #Insomnia Treatment:  f/u  "Sleep medicine for SXS suggest of CALVIN  Continue Belsomra 10mg qHS  #Behavioral/Environmental Treatment  We recommend engaging in activities that stimulate cognitively and socially while avoiding excessive stimulation and fatigue in overwhelmingly complex situations.  We recommend integrating routine and schedule into your daily life. https://www.alzheimersproject.org/news/the-importance-of-routine-and-familiarity-to-persons-with-dementia/  #Health Maintenance/Lifestyle Advice  We have discussed the value in aggressively controlling vascular risk factors like hypertension, hyperlipidemia, and Diabetes SBP<130, LDL<100, A1C<7.0.  We discussed the need to optimize lifestyle choices including a heart-healthy diet (e.g., Mediterranean or DASH), increased cardiovascular exercise (goal 150 minutes of moderate-intensity per week), and stay cognitively and socially active.  #Support  We all need support sometimes. Get easy access to local resources, community programs, and services. https://www.communityresourcefinder.org/  Learn more about Cognitive Impairment in [place]: https://www.alz.org/professionals/public-health/state-overview/[place]  #Safety  [place] has no laws against driving with dementia specifically but obviously has laws about medical conditions which impact a person's ability to drive safely. If you believe your loved one's driving capacity has diminished, please reach out to either your primary care physician or our office to discuss driving restrictions or revocation of their license. To learn more: https://www.dementiacarecentral.com/caregiverinfo/driving-problems/  The Alzheimer's Association administers the nationwide "Safe Return" program with identification bracelets, necklaces, or clothing tags and 24-hour assistance. More information is available online at https://www.alz.org/help-support/caregiving/safety/medicalert-with-24-7-wandering-support  #Follow up:  Follow-up as needed.    Thank you " for allowing us to participate in the care of your patient. Please do not hesitate to contact us with any questions or concerns.     It was a pleasure seeing Mr. Lopez and we look forward to seeing them at their follow-up visit.     This note is dictated on M*Modal Fluency Direct word recognition program. There are word recognition mistakes that are occasionally missed on review.         Scheduled Follow-up :  Future Appointments   Date Time Provider Department Center   5/4/2023 10:15 AM Maria Luz Toney MD [facility] DERM Óscar Sandoval       After Visit Medication List :     Medication List            Accurate as of January 17, 2023 10:32 AM. If you have any questions, ask your nurse or doctor.                CONTINUE taking these medications      atorvastatin 40 MG tablet  Commonly known as: LIPITOR  Take 1 tablet (40 mg total) by mouth once daily.     BELSOMRA 10 mg Tab  Generic drug: suvorexant  Take 1 tablet by mouth every evening.     CEROVITE SENIOR 0.4 mg-300 mcg- 250 mcg Tab  Generic drug: multivit-min-FA-lycopen-lutein  Take 1 tablet by mouth once daily.     desonide 0.05 % cream  Commonly known as: DESOWEN     dextran 70-hypromellose ophthalmic solution  Commonly known as: TEARS  Place 1 drop into both eyes as needed.     galantamine 4 MG Tab  Commonly known as: RAZADYNE  Take 1 tablet (4 mg total) by mouth 2 (two) times daily with meals.     iron-vitamin C 100-250 mg (ICAR-C) 100-250 mg Tab  Take 1 tablet by mouth once daily. for 90 days     losartan 50 MG tablet  Commonly known as: COZAAR  Take 1 tablet (50 mg total) by mouth once daily.     metoprolol succinate 50 MG 24 hr tablet  Commonly known as: TOPROL-XL  Take 1 tablet (50 mg total) by mouth once daily.     montelukast 10 mg tablet  Commonly known as: SINGULAIR  Take 1 tablet (10 mg total) by mouth every evening.     MYRBETRIQ 25 mg Tb24 ER tablet  Generic drug: mirabegron  Take 1 tablet (25 mg total) by mouth once daily.     omeprazole 40 MG  capsule  Commonly known as: PRILOSEC  Take 1 capsule (40 mg total) by mouth every morning.     polyethylene glycol 17 gram Pwpk  Commonly known as: GLYCOLAX  Take 17 g by mouth once daily.     PROCTOFOAM HC rectal foam  Generic drug: hydrocortisone-pramoxine  USE ONE APPLICATIORFUL RECTALLY TWICE DAILY     sertraline 100 MG tablet  Commonly known as: ZOLOFT  Take 1 tablet (100 mg total) by mouth once daily.     triamcinolone acetonide 0.025 % Lotn  Apply 1 application topically 2 (two) times daily.              Signing Physician:  Óscar Miller MD    Billing:        -----------------------------------------------------------------------------    I performed this consultation using real-time Telehealth tools, including a live video connection between my location and the patient's location (their home within the [facility]). Prior to initiating the consultation, I obtained informed verbal consent to perform this consultation using Telehealth tools and answered all the questions about the Telehealth interaction. The participants understand that only a limited neurological exam and limited neuropsychological testing can be performed using Telehealth tools.    I spent a total of 50 minutes (time-in: 10:05 AM; time-out: 10:55 AM) on 2023-01-17, virtually face-to-face with the patient and caregiver(s), >50% of that time was spent counseling regarding the symptoms, treatment plan, risks, therapeutic options, lifestyle modifications, and/or safety issues for the diagnoses above.    10/14 Review of Systems completed and is negative except as stated above in HPI (Systems reviewed: Const, Eyes, ENT, Resp, CV, GI, , MSK, Skin, Neuro)    I reviewed the summation of records from outside physicians for a total of 5 minutes on 2023-01-17. Reviewed and summation of records from an outside physician was performed as summarized above in HPI    I performed a neurobehavioral status examination that included a clinical  assessment of thinking, reasoning, and judgment. Please see above HPI and ROS for full details. This exam was performed on 2023-01-17 and included 14 minutes spent on direct face-to-face clinical observation and interview with the patient and 18 minutes spent interpreting test results and preparing the report. The total time of 32 minutes spent on the neurobehavioral status examination is not included in the time spent on evaluation and management coding.    Total Billing time spent on encounter/documentation for this patient's evaluation and management, not including the neurobehavioral status examination: 41 minutes.

## 2023-02-09 NOTE — PROGRESS NOTES
Adherence packed for 28 days using Dispill:   (Compressed monthly packing)      Morning pockets:   Cerovite Multi-vitamin   Galantamine 4 mg   Losartan 50 mg   Metoprolol succinate ER 50 mg   Myrbetriq 25 mg   Omeprazole 40 mg   Sertraline 100 mg   Vitamin B12 1,000 mcg   Vitamin D3 5,000 IU      Evening pockets:   Atorvastatin 40 mg   Galantamine 4 mg   Melatonin 5 mg   Montelukast 10 mg         *Sent to [facility] via same-day delivery.

## 2023-03-08 NOTE — PROGRESS NOTES
Adherence packed for 28 days using Dispill:   (Compressed monthly packing)      Morning pockets:   Cerovite Multi-vitamin   Galantamine 4 mg   Losartan 50 mg   Metoprolol succinate ER 50 mg   Myrbetriq 25 mg   Omeprazole 40 mg   Sertraline 100 mg   Vitamin B12 1,000 mcg   Vitamin D3 5,000 IU      Evening pockets:   Atorvastatin 40 mg   Galantamine 4 mg   Melatonin 5 mg   Montelukast 10 mg

## 2023-03-14 ENCOUNTER — PATIENT MESSAGE (OUTPATIENT)
Dept: NEUROLOGY | Facility: CLINIC | Age: 88
End: 2023-03-14
Payer: MEDICARE

## 2023-03-31 NOTE — PROGRESS NOTES
Adherence packed for 28 days using Dispill:   (Compressed monthly packing)      Morning pockets:   Cerovite Multi-vitamin   Galantamine 4 mg   Losartan 50 mg   Metoprolol succinate ER 50 mg   Myrbetriq 25 mg   Omeprazole 40 mg   Sertraline 100 mg   Vitamin B12 1,000 mcg   Vitamin D3 5,000 IU      Evening pockets:   Atorvastatin 40 mg   Galantamine 4 mg   Melatonin 5 mg   Montelukast 10 mg       *To be sent to home address via same-day delivery on Monday, 4/3/2023.

## 2023-04-25 ENCOUNTER — TELEPHONE (OUTPATIENT)
Dept: PRIMARY CARE CLINIC | Facility: CLINIC | Age: 88
End: 2023-04-25
Payer: MEDICARE

## 2023-04-27 NOTE — PROGRESS NOTES
Adherence packed for 28 days using Dispill:   (Compressed monthly packing)     Morning pockets:   Cerovite Multi-vitamin   Galantamine 4 mg   Losartan 50 mg   Metoprolol succinate ER 50 mg   Myrbetriq 25 mg   Omeprazole 40 mg   Sertraline 100 mg   Vitamin B12 1,000 mcg   Vitamin D3 5,000 IU     Evening pockets:   Atorvastatin 40 mg   Galantamine 4 mg   Melatonin 5 mg   Montelukast 10 mg     *To be sent to home address via same-day delivery- pending call to daughter to ensure she is ok with price. $143.64 total copay. TTN

## 2023-05-04 ENCOUNTER — OFFICE VISIT (OUTPATIENT)
Dept: DERMATOLOGY | Facility: CLINIC | Age: 88
End: 2023-05-04
Payer: MEDICARE

## 2023-05-04 DIAGNOSIS — H21.302: ICD-10-CM

## 2023-05-04 DIAGNOSIS — L57.0 AK (ACTINIC KERATOSIS): ICD-10-CM

## 2023-05-04 DIAGNOSIS — D48.5 NEOPLASM OF UNCERTAIN BEHAVIOR OF SKIN: Primary | ICD-10-CM

## 2023-05-04 DIAGNOSIS — L98.499 SKIN ULCER, UNSPECIFIED ULCER STAGE: ICD-10-CM

## 2023-05-04 PROCEDURE — 99499 NO LOS: ICD-10-PCS | Mod: S$PBB,,, | Performed by: DERMATOLOGY

## 2023-05-04 PROCEDURE — 11441 EXC FACE-MM B9+MARG 0.6-1 CM: CPT | Mod: S$PBB,,, | Performed by: DERMATOLOGY

## 2023-05-04 PROCEDURE — 88304 PR  SURG PATH,LEVEL III: ICD-10-PCS | Mod: 26,,, | Performed by: PATHOLOGY

## 2023-05-04 PROCEDURE — 17004 DESTROY PREMAL LESIONS 15/>: CPT | Mod: 59,PBBFAC | Performed by: DERMATOLOGY

## 2023-05-04 PROCEDURE — 88304 TISSUE EXAM BY PATHOLOGIST: CPT | Mod: 26,,, | Performed by: PATHOLOGY

## 2023-05-04 PROCEDURE — 99999 PR PBB SHADOW E&M-EST. PATIENT-LVL III: ICD-10-PCS | Mod: PBBFAC,,, | Performed by: DERMATOLOGY

## 2023-05-04 PROCEDURE — 99499 UNLISTED E&M SERVICE: CPT | Mod: S$PBB,,, | Performed by: DERMATOLOGY

## 2023-05-04 PROCEDURE — 17004 PR DESTRUCTION, PREMALIGNANT LESIONS; 15 OR MORE LESIONS: ICD-10-PCS | Mod: S$PBB,XS,, | Performed by: DERMATOLOGY

## 2023-05-04 PROCEDURE — 88304 TISSUE EXAM BY PATHOLOGIST: CPT | Performed by: PATHOLOGY

## 2023-05-04 PROCEDURE — 11441 EXC FACE-MM B9+MARG 0.6-1 CM: CPT | Mod: PBBFAC | Performed by: DERMATOLOGY

## 2023-05-04 PROCEDURE — 17004 DESTROY PREMAL LESIONS 15/>: CPT | Mod: S$PBB,XS,, | Performed by: DERMATOLOGY

## 2023-05-04 PROCEDURE — 99213 OFFICE O/P EST LOW 20 MIN: CPT | Mod: PBBFAC | Performed by: DERMATOLOGY

## 2023-05-04 PROCEDURE — 99999 PR PBB SHADOW E&M-EST. PATIENT-LVL III: CPT | Mod: PBBFAC,,, | Performed by: DERMATOLOGY

## 2023-05-04 PROCEDURE — 11441 PR EXC SKIN BENIG 0.6-1 CM FACE,FACIAL: ICD-10-PCS | Mod: S$PBB,,, | Performed by: DERMATOLOGY

## 2023-05-04 RX ORDER — MUPIROCIN 20 MG/G
OINTMENT TOPICAL
Qty: 22 G | Refills: 3 | Status: SHIPPED | OUTPATIENT
Start: 2023-05-04

## 2023-05-04 NOTE — PROGRESS NOTES
Subjective:      Patient ID:  Hu Lopez is a 87 y.o. male who presents for   Chief Complaint   Patient presents with    Mass     L eye    scaly areas     Head and forehead     Mass    Hair/Scalp Problem  Patient with new complaint of mass  Location: side of L eye  Duration: 1 yr  Symptoms: growing  Relieving factors/Previous treatments: none    Patient with new complaint of scaly areas  Location: top of head, and forehead  Duration: 1 year  Symptoms: dry and scaly   Relieving factors/Previous treatments: none        Review of Systems   Skin:  Negative for daily sunscreen use, activity-related sunscreen use, recent sunburn and wears hat.   Hematologic/Lymphatic: Bruises/bleeds easily.     Objective:   Physical Exam   Constitutional: He appears well-developed and well-nourished. No distress.   Neurological: He is alert and oriented to person, place, and time. He is not disoriented.   Psychiatric: He has a normal mood and affect.   Skin:   Areas Examined (abnormalities noted in diagram):   Scalp / Hair Palpated and Inspected          Diagram Legend     Erythematous scaling macule/papule c/w actinic keratosis       Vascular papule c/w angioma      Pigmented verrucoid papule/plaque c/w seborrheic keratosis      Yellow umbilicated papule c/w sebaceous hyperplasia      Irregularly shaped tan macule c/w lentigo     1-2 mm smooth white papules consistent with Milia      Movable subcutaneous cyst with punctum c/w epidermal inclusion cyst      Subcutaneous movable cyst c/w pilar cyst      Firm pink to brown papule c/w dermatofibroma      Pedunculated fleshy papule(s) c/w skin tag(s)      Evenly pigmented macule c/w junctional nevus     Mildly variegated pigmented, slightly irregular-bordered macule c/w mildly atypical nevus      Flesh colored to evenly pigmented papule c/w intradermal nevus       Pink pearly papule/plaque c/w basal cell carcinoma      Erythematous hyperkeratotic cursted plaque c/w SCC      Surgical scar  with no sign of skin cancer recurrence      Open and closed comedones      Inflammatory papules and pustules      Verrucoid papule consistent consistent with wart     Erythematous eczematous patches and plaques     Dystrophic onycholytic nail with subungual debris c/w onychomycosis     Umbilicated papule    Erythematous-base heme-crusted tan verrucoid plaque consistent with inflamed seborrheic keratosis     Erythematous Silvery Scaling Plaque c/w Psoriasis     See annotation      Assessment / Plan:      Pathology Orders:       Normal Orders This Visit    Specimen to Pathology, Dermatology     Questions:    Procedure Type: Dermatology and skin neoplasms    Number of Specimens: 1    ------------------------: -------------------------    Spec 1 Procedure: Biopsy    Spec 1 Clinical Impression: r/o seb. hyperplasia vs cyst    Spec 1 Source: left lateral eyelid    Release to patient: Immediate          Neoplasm of uncertain behavior of skin  -     Specimen to Pathology, Dermatology    Cyst of anterior chamber of left eye  -     Ambulatory referral/consult to Dermatology  EXCISION- CYST     CONSENT: The purpose of the excision as well as potential risks were explained to the patient and written informed consent was obtained.      INDICATION and LOCATION: Likely epidermal inclusion cyst, left lateral eyelid    PRE-OP LESION SIZE: 0.6 cm x 1cm    PROCEDURE:The site was prepped with chlorhexidine and draped in a sterile fashion. local anesthesia was achieved using lidocaine 1% with epinephrine for a total of 0.5 cc. A fusiform figure was drawn around the cyst and oriented in a manner appropriate to skin tension lines. An incision was then made along the marked lines and carried down to subcutaneous tissue. The cyst was dissected free from surrounding subcutaneous tissue and submitted to pathology for examination. Initial wound edge approximation was achieved with running suture using 4-0 Prolene for optimum alignment. A  sterile pressure dressing was applied. The patient tolerated the procedure well, and there were no complications. Blood loss was not significant.     POST-OP SIZE:  1.1 cm    DISCHARGE INSTRUCTIONS: The patient was instructed to keep the pressure bandage in place for 24 hours.  After 24 hours the area can be cleaned with soap and water.  Apply petrolatum-based ointment and bandage daily until suture removal visit.  The patient was instructed to call the clinic if any signs of infection develop, such as enlarging area of redness, swelling, increasing pain or purulent drainage. The patient was given an appointment to return to clinic for suture removal and pathology report.         AK (actinic keratosis)  PEEL PROCEDURE NOTE - CHEMICAL PEEL  Diagnosis: Actinic Keratosis  Risks, benefits, and alternatives were reviewed, and patient verbalized consent.  Any makeup was removed and face was prepped with alcohol swabs in the area (s) to be treated.  Jessner solution followed by TCA 30% were applied to the >15 actinic keratosis until white frost developed   Patient experienced mild to moderate burning sensations but tolerated procedure well.   Ice was then applied to face for a period of 3-5 minutes, followed by Vaseline Jelly   Sunscreen was recommended daily for at least 2 weeks with intensive sun              No follow-ups on file.

## 2023-05-04 NOTE — PATIENT INSTRUCTIONS
Shave Biopsy Wound Care    Your doctor has performed a shave biopsy today.  A band aid and vaseline ointment has been placed over the site.  This should remain in place for NO LONGER THAN 48 hours.  It is fine to remove the bandaid after 24 hours, if the area is no longer bleeding. It is recommended that you keep the area dry (do not wet)) for the first 24 hours.  After 24 hours, wash the area with warm soap and water and apply Vaseline jelly.  Many patients prefer to use Neosporin or Bacitracin ointment.  This is acceptable; however, know that you can develop an allergy to this medication even if you have used it safely for years.  It is important to keep the area moist.  Letting it dry out and get air slows healing time, and will worsen the scar.        If you notice increasing redness, tenderness, pain, or yellow drainage at the biopsy site, please notify your doctor.  These are signs of an infection.    If your biopsy site is bleeding, apply firm pressure for 15 minutes straight.  Repeat for another 15 minutes, if it is still bleeding.   If the surgical site continues to bleed, then please contact your doctor.      For [facility] users:   You will receive your biopsy results in [facility] as soon as they are available. Please be assured that your physician/provider will review your results and will then determine what further treatment, evaluation, or planning is required. You should be contacted by your physician's/provider's office within 5 business days of receiving your results; If not, please reach out to directly. This is one more way [facility] is putting you first.     [facility] [place] [address]/ [phone] FAX/ [email or web address]     Post Chemical Peel   DO wash twice a day with a gentle cleanser and liberally apply a bland moisturizer after  cleansing and throughout the day as needed to prevent dryness and formation of crust  (Vaseline/Aquaphor/Cerave ointment q am and pm  and as often as needed for first week , Cetaphil cleanser, CeraVe Hydrating sunscreen SPF 50).   Do NOT vigorously rub the skin and DO NOT pick at the flaking skin as this may cause  scarring.   Sun protection is critical. You should NOT have any sun exposure; wear a broad  spectrum sunscreen with a minimum of SPF 35. This includes driving to work. Obviously,  do not use a tanning salon.   AVOID laser treatments, waxing, the use of depilatories, or microdermabrasion for 3  weeks.   DO wait 7 days before resuming the use of Retin-a (tretinoin), Renova, Differin, Tazorac,  Ziana, Veltin, Atralin, glycolic acids or any other exfoliating agents such as a Clarisonic  Brush.   AVOID swimming, sauna, or whirlpool use for at least 7 days after the peel.   If you were instructed to take an antiviral medication, DO complete the prescribed  course.   The procedure may cause swelling, redness, crusting, dryness, skin sensitivity, itching, and  peeling of the site which could last for 1-2 weeks. In the days after the peel, the skin may  feel and look tight, with parchment-paper like changes or darkening. These are  expected reactions.  We want you to have an outstanding result. If you have any questions or unexpected  concerns, please call the office for assistance.

## 2023-05-11 ENCOUNTER — OFFICE VISIT (OUTPATIENT)
Dept: DERMATOLOGY | Facility: CLINIC | Age: 88
End: 2023-05-11
Payer: MEDICARE

## 2023-05-11 DIAGNOSIS — L57.0 AK (ACTINIC KERATOSIS): Primary | ICD-10-CM

## 2023-05-11 LAB
FINAL PATHOLOGIC DIAGNOSIS: NORMAL
GROSS: NORMAL
Lab: NORMAL
MICROSCOPIC EXAM: NORMAL

## 2023-05-11 PROCEDURE — 99024 POSTOP FOLLOW-UP VISIT: CPT | Mod: POP,,, | Performed by: DERMATOLOGY

## 2023-05-11 PROCEDURE — 99213 OFFICE O/P EST LOW 20 MIN: CPT | Mod: PBBFAC | Performed by: DERMATOLOGY

## 2023-05-11 PROCEDURE — 99024 PR POST-OP FOLLOW-UP VISIT: ICD-10-PCS | Mod: POP,,, | Performed by: DERMATOLOGY

## 2023-05-11 PROCEDURE — 99999 PR PBB SHADOW E&M-EST. PATIENT-LVL III: CPT | Mod: PBBFAC,,, | Performed by: DERMATOLOGY

## 2023-05-11 PROCEDURE — 99999 PR PBB SHADOW E&M-EST. PATIENT-LVL III: ICD-10-PCS | Mod: PBBFAC,,, | Performed by: DERMATOLOGY

## 2023-05-11 NOTE — PATIENT INSTRUCTIONS
Cryosurgery Procedure Note    Verbal consent from the patient is obtained including, but not limited to, risk of hypopigmentation/hyperpigmentation, scar, recurrence of lesion. The patient is aware of the precancerous quality and need for treatment of these lesions. Liquid nitrogen cryosurgery is applied to the vertex of scalp actinic keratoses, as detailed in the physical exam, to produce a freeze injury. The patient is aware that blisters may form and is instructed on wound care with gentle cleansing and use of vaseline ointment to keep moist until healed. The patient is supplied a handout on cryosurgery and is instructed to call if lesions do not completely resolve.

## 2023-05-11 NOTE — PROGRESS NOTES
Subjective:      Patient ID:  Hu Lopez is a 87 y.o. male who presents for   Chief Complaint   Patient presents with    Post-op Evaluation     HPI    Review of Systems   Constitutional:  Negative for fever and chills.   Skin:  Negative for daily sunscreen use, activity-related sunscreen use, recent sunburn and wears hat.   Hematologic/Lymphatic: Bruises/bleeds easily.     Objective:   Physical Exam   Skin:   Areas Examined (abnormalities noted in diagram):   Scalp / Hair Palpated and Inspected  Head / Face Inspection Performed          Diagram Legend     Erythematous scaling macule/papule c/w actinic keratosis       Vascular papule c/w angioma      Pigmented verrucoid papule/plaque c/w seborrheic keratosis      Yellow umbilicated papule c/w sebaceous hyperplasia      Irregularly shaped tan macule c/w lentigo     1-2 mm smooth white papules consistent with Milia      Movable subcutaneous cyst with punctum c/w epidermal inclusion cyst      Subcutaneous movable cyst c/w pilar cyst      Firm pink to brown papule c/w dermatofibroma      Pedunculated fleshy papule(s) c/w skin tag(s)      Evenly pigmented macule c/w junctional nevus     Mildly variegated pigmented, slightly irregular-bordered macule c/w mildly atypical nevus      Flesh colored to evenly pigmented papule c/w intradermal nevus       Pink pearly papule/plaque c/w basal cell carcinoma      Erythematous hyperkeratotic cursted plaque c/w SCC      Surgical scar with no sign of skin cancer recurrence      Open and closed comedones      Inflammatory papules and pustules      Verrucoid papule consistent consistent with wart     Erythematous eczematous patches and plaques     Dystrophic onycholytic nail with subungual debris c/w onychomycosis     Umbilicated papule    Erythematous-base heme-crusted tan verrucoid plaque consistent with inflamed seborrheic keratosis     Erythematous Silvery Scaling Plaque c/w Psoriasis     See annotation      Assessment / Plan:       Scar  Patient presents for suture removal left lateral eye. The wound is well healed without signs of infection.  The sutures are removed. Wound care and activity instructions given. Return prn.      No follow-ups on file.

## 2023-05-11 NOTE — PROGRESS NOTES
Patient presents for suture removal left lateral eye. The wound is well healed without signs of infection.  The sutures are removed. Wound care and activity instructions given. Return prn.

## 2023-05-16 ENCOUNTER — PATIENT MESSAGE (OUTPATIENT)
Dept: PHARMACY | Facility: CLINIC | Age: 88
End: 2023-05-16
Payer: MEDICARE

## 2023-05-16 ENCOUNTER — OFFICE VISIT (OUTPATIENT)
Dept: PRIMARY CARE CLINIC | Facility: CLINIC | Age: 88
End: 2023-05-16
Payer: MEDICARE

## 2023-05-16 VITALS
DIASTOLIC BLOOD PRESSURE: 68 MMHG | TEMPERATURE: 98 F | OXYGEN SATURATION: 98 % | HEART RATE: 69 BPM | BODY MASS INDEX: 27.22 KG/M2 | HEIGHT: 71 IN | SYSTOLIC BLOOD PRESSURE: 127 MMHG | WEIGHT: 194.44 LBS

## 2023-05-16 DIAGNOSIS — N18.32 STAGE 3B CHRONIC KIDNEY DISEASE: ICD-10-CM

## 2023-05-16 DIAGNOSIS — G47.33 OSA (OBSTRUCTIVE SLEEP APNEA): ICD-10-CM

## 2023-05-16 DIAGNOSIS — F33.9 DEPRESSION, RECURRENT: ICD-10-CM

## 2023-05-16 DIAGNOSIS — E72.19 OTHER DISORDERS OF SULFUR-BEARING AMINO-ACID METABOLISM: ICD-10-CM

## 2023-05-16 DIAGNOSIS — I70.209 ATHEROSCLEROSIS OF ARTERIES OF EXTREMITIES: ICD-10-CM

## 2023-05-16 DIAGNOSIS — E64.0 SEQUELAE OF PROTEIN-CALORIE MALNUTRITION: Primary | ICD-10-CM

## 2023-05-16 DIAGNOSIS — I26.99 PULMONARY EMBOLISM, UNSPECIFIED CHRONICITY, UNSPECIFIED PULMONARY EMBOLISM TYPE, UNSPECIFIED WHETHER ACUTE COR PULMONALE PRESENT: ICD-10-CM

## 2023-05-16 DIAGNOSIS — C61 PROSTATE CANCER: ICD-10-CM

## 2023-05-16 DIAGNOSIS — D69.2 SENILE PURPURA: ICD-10-CM

## 2023-05-16 DIAGNOSIS — S89.92XA KNEE INJURY, LEFT, INITIAL ENCOUNTER: ICD-10-CM

## 2023-05-16 DIAGNOSIS — I47.10 SVT (SUPRAVENTRICULAR TACHYCARDIA): ICD-10-CM

## 2023-05-16 DIAGNOSIS — Z74.09 IMPAIRED FUNCTIONAL MOBILITY, BALANCE, GAIT, AND ENDURANCE: ICD-10-CM

## 2023-05-16 DIAGNOSIS — H90.6 MIXED CONDUCTIVE AND SENSORINEURAL HEARING LOSS OF BOTH EARS: ICD-10-CM

## 2023-05-16 PROCEDURE — 99212 OFFICE O/P EST SF 10 MIN: CPT | Mod: S$GLB,,, | Performed by: INTERNAL MEDICINE

## 2023-05-16 PROCEDURE — 99212 PR OFFICE/OUTPT VISIT, EST, LEVL II, 10-19 MIN: ICD-10-PCS | Mod: S$GLB,,, | Performed by: INTERNAL MEDICINE

## 2023-05-16 RX ORDER — CHOLECALCIFEROL (VITAMIN D3) 25 MCG
1000 TABLET ORAL DAILY
Qty: 90 TABLET | Refills: 3 | Status: SHIPPED | OUTPATIENT
Start: 2023-05-16

## 2023-05-16 RX ORDER — METOPROLOL SUCCINATE 25 MG/1
25 TABLET, EXTENDED RELEASE ORAL DAILY
Qty: 90 TABLET | Refills: 3 | Status: SHIPPED | OUTPATIENT
Start: 2023-05-16 | End: 2024-05-15

## 2023-05-16 NOTE — ASSESSMENT & PLAN NOTE
Taking metoprolol, unclear if SVT is the reason for this. Has orthostasis  · Decrease metoprolol to 25mg.

## 2023-05-16 NOTE — PROGRESS NOTES
Primary Care Provider Appointment - [facility]  SHARED NOTE: Rosalind SLilian Cee (MS4), Dr Rendon (Attending)    Subjective:      Patient ID: Hu Lopez is a 87 y.o. male with PMHx of Alzheimer's disease, HTN, HLD, PAD, Prostate cancer, anemia who presents to the clinic today for his evalution      Chief Complaint: Hypertension and Follow-up    Prior to this visit, patient's last encounter with PCP was Visit date not found. This was his first appointment with Dr Rendon. He was seen at his last appointment by SOCORRO Sharma.     He is accompanied to clinic today by his daughter.  Pt's main complaint today is his worsening vision. 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 He is being followed by retinal specialist, Dr. Huerta, where receives injection in the right eye every 4-6 weeks.  He has also been complaining of nocturia (about 4eps per night). Pt has a hx of OAB and was previously being treated with oxybutynin. However, this was switched to mirabrgeon as there were an interaction with his galantamine medication. Pt also complaining of fatigue and chills. He has a hx of CALVIN, but does not use his CPAP. Pt advised to use his CPAP as this might help alleviate symptoms of nocturia and fatigue.   Pt also has a hx of bilateral hearing loss but only wears one hearing aid after losing the other. Patient encouraged to follow up w/ ENT for evaluation and a new pair.   Pt has a hx of chronic fungal toe nail infection and would like to get treated for this.            HTN  - Currently taking: Losartan 50 mg , Metoprolol succinate ER 50 mg      - Today, BP is: 127/68     HLD  - Currently taking: Atorvastatin 40 mg   - Last lipid panel was on 08/25/22  The ASCVD Risk score (José Manuel HAYDEN, et al., 2019) failed to calculate for the following reasons:    The 2019 ASCVD risk score is only valid for ages 40 to 79                       Mental Health  - Currently taking: Sertraline 100 mg   - - Social support system consists of wife, children and grandchildren, lives at home and has a caretaker.  w   Specialty notes (if they see heme/onc, GI, ortho, ophth, derm, etc...)   -Pt was recently seen by dermatologist, Maria Luz Toney MD , on 05/04 for excision of an inclusion cyst on his left lateral eyelid.      Care Gaps:  - Pt to receive shingles vaccine after today's appointment       Medications: Does have pill packs  Compressed monthly packing)      Morning pockets:   Cerovite Multi-vitamin   Galantamine 4 mg   Losartan 50 mg   Metoprolol succinate ER 50 mg-> REDUCE TO 25mg   Myrbetriq 25 mg -> STOP  Omeprazole 40 mg   Sertraline 100 mg   Vitamin B12 1,000 mcg   Vitamin D3 5,000 IU -> REDUCE TO 1000mg     Evening pockets:   Atorvastatin 40 mg   Galantamine 4 mg   Melatonin 5 mg   Montelukast 10 mg-> STOP    4Ms for Medical Decision-Making in Older Adults    Last Completed EAWV: None    MOBILITY:  Get Up and Go:  No flowsheet data found.  Activities of Daily Living:  No flowsheet data found.  Whisper Test:  No flowsheet data found.  Disability Status:  No flowsheet data found.  Nutrition Screening:  No flowsheet data found. Screening Score: 0-7 Malnourished, 8-11 At Risk, 12-14 Normal    MENTATION:   Depression Patient Health Questionnaire:  Depression Patient Health Questionnaire 5/16/2023   Over the last two weeks how often have you been bothered by little interest or pleasure in doing things More than half the days   Over the last two weeks how often have you been bothered by feeling down, depressed or hopeless More than half the days   PHQ-2 Total Score 4   PHQ-4 Total Score -   PHQ-9 Total Score 9   PHQ-9 Interpretation Mild     Has Dementia Dx: Yes    Cognitive Function Screening:  No flowsheet data found.  Cognitive Function Screening Total - Less than 4 = Abnormal,  Greater than or equal to 4 = Normal    MEDICATIONS:  High  "Risk Medications:  Total Active Medications: 1  sertraline - 100 MG    WHAT MATTERS MOST:  Advance Care Planning   ACP Status:   Patient has had an ACP conversation  Living Will: No  Power of : No  La[place]: No    What is most important right now is to focus on spending time at home    Accordingly, we have decided that the best plan to meet the patient's goals includes continuing with treatment      What matters most to patient today is: staying at home with family                 Social History     Socioeconomic History    Marital status:    Tobacco Use    Smoking status: Never    Smokeless tobacco: Never   Substance and Sexual Activity    Alcohol use: Yes     Alcohol/week: 0.0 standard drinks     Comment: rare    Drug use: No    Sexual activity: Never   Social History Narrative    3 children - healthy.       Review of Systems   Constitutional:  Negative for activity change.   HENT:          Hearing impairment   Musculoskeletal:  Positive for arthralgias and gait problem.   Hematological:  Bruises/bleeds easily.     Objective:   /68 (BP Location: Left arm, Patient Position: Sitting, BP Method: Large (Automatic))   Pulse 69   Temp 98.3 °F (36.8 °C) (Oral)   Ht 5' 11" (1.803 m)   Wt 88.2 kg (194 lb 7.1 oz)   SpO2 98%   BMI 27.12 kg/m²     Physical Exam  Eyes:      Comments: exotropia   Cardiovascular:      Rate and Rhythm: Normal rate.   Pulmonary:      Effort: Pulmonary effort is normal.   Musculoskeletal:      Comments: Low muscle mass   Skin:     Findings: Bruising present.   Neurological:      Mental Status: He is alert.       Lab Results   Component Value Date    WBC 5.92 05/16/2023    HGB 13.4 (L) 05/16/2023    HCT 40.8 05/16/2023     05/16/2023    CHOL 142 08/25/2022    TRIG 87 08/25/2022    HDL 42 08/25/2022    ALT 21 08/31/2022    AST 21 08/31/2022     05/16/2023    K 4.9 05/16/2023     05/16/2023    CREATININE 1.5 (H) 05/16/2023    BUN 30 (H) 05/16/2023    CO2 20 " (L) 05/16/2023    TSH 1.571 05/16/2023    PSA 0.34 06/20/2017    INR 1.0 06/04/2012    HGBA1C 5.5 06/20/2017       Current Outpatient Medications on File Prior to Visit   Medication Sig Dispense Refill    atorvastatin (LIPITOR) 40 MG tablet Take 1 tablet (40 mg total) by mouth once daily. 30 tablet 11    desonide (DESOWEN) 0.05 % cream       dextran 70-hypromellose (TEARS) ophthalmic solution Place 1 drop into both eyes as needed. 30 mL 6    galantamine (RAZADYNE) 4 MG Tab Take 1 tablet (4 mg total) by mouth 2 (two) times daily with meals. 60 tablet 11    iron-vitamin C 100-250 mg, ICAR-C, 100-250 mg Tab Take 1 tablet by mouth once daily. for 90 days 90 tablet 3    losartan (COZAAR) 50 MG tablet Take 1 tablet (50 mg total) by mouth once daily. 90 tablet 3    mirabegron (MYRBETRIQ) 25 mg Tb24 ER tablet Take 1 tablet (25 mg total) by mouth once daily. 90 tablet 3    montelukast (SINGULAIR) 10 mg tablet Take 1 tablet (10 mg total) by mouth every evening. 30 tablet 11    multivit-min-FA-lycopen-lutein (CEROVITE SENIOR) 0.4 mg-300 mcg- 250 mcg Tab Take 1 tablet by mouth once daily. 90 tablet 3    mupirocin (BACTROBAN) 2 % ointment Apply daily to hand, cover with bandage. 22 g 3    omeprazole (PRILOSEC) 40 MG capsule Take 1 capsule (40 mg total) by mouth every morning. 30 capsule 11    polyethylene glycol (GLYCOLAX) 17 gram PwPk Take 17 g by mouth once daily. 30 packet 11    PROCTOFOAM HC rectal foam USE ONE APPLICATIORFUL RECTALLY TWICE DAILY 10 g 3    sertraline (ZOLOFT) 100 MG tablet Take 1 tablet (100 mg total) by mouth once daily. 90 tablet 3    suvorexant (BELSOMRA) 10 mg Tab Take 1 tablet by mouth every evening. 30 tablet 3    triamcinolone acetonide 0.025 % Lotn Apply 1 application topically 2 (two) times daily. 60 mL 0    [DISCONTINUED] multivitamin (THERAGRAN) per tablet Take 1 tablet by mouth once daily. 90 tablet 3     No current facility-administered medications on file prior to visit.         Assessment:  "  87 y.o. male with multiple co-morbid illnesses here to follow-up with PCP and continue work-up of chronic issues    Plan:     Problem List Items Addressed This Visit          Psychiatric    Depression, recurrent    Relevant Medications    vitamin D (VITAMIN D3) 1000 units Tab       ENT    Mixed conductive and sensorineural hearing loss of both ears     New with wearing hearing aids  Lost one of the hearing aids  Needs new set         Relevant Orders    Ambulatory referral/consult to ENT    Ambulatory referral/consult to Audiology       Cardiac/Vascular    Atherosclerosis of arteries of extremities     Location in Record and Date:  X-Ray ankle-6/15/2017    "Small vascular calcifications are noted at the ankle."    Location in Record and Date:  X-Ray Knee Bilateral-9/21/2011    "THE PATIENT HAS CALCIFIC ATHEROSCLEROSIS."    Other Chronic Conditions:  HLD  Medications:  aspirin 81 mg, Lipitor 10 mg           SVT (supraventricular tachycardia)     Taking metoprolol, unclear if SVT is the reason for this. Has orthostasis  Decrease metoprolol to 25mg.         Relevant Medications    metoprolol succinate (TOPROL-XL) 25 MG 24 hr tablet       Renal/    Stage 3b chronic kidney disease     GFR trending down, likely related to longstanding HTN  Monitor  Control HTN            Hematology    Senile purpura     Ecchymoses and purpura on UE bilaterally  Monitor  Continue APT         RESOLVED: Pulmonary embolism, unspecified chronicity, unspecified pulmonary embolism type, unspecified whether acute cor pulmonale present       Oncology    Prostate cancer     H/o radical prostatectomy. Most recent PSA 2, may have cancer recurrence  Patient is of advanced age, and daughter (PoA) in agreement that additional interventions would not extend his life or add quality              Endocrine    Sequelae of protein-calorie malnutrition - Primary     Albumin normal, but low muscle mass. Has electrolyte disturbances and appears frail  Advised " to eat a balanced diet   Ordered PT at home         Relevant Orders    CBC W/ AUTO DIFFERENTIAL (Completed)    TSH (Completed)    BASIC METABOLIC PANEL (Completed)    RESOLVED: Other disorders of sulfur-bearing amino-acid metabolism     Unclear               Other    CALVIN (obstructive sleep apnea)     Patient with nocturia. Is not using his CPAP  Advised to use his CPAP         Knee injury, left, initial encounter    Relevant Orders    Ambulatory referral/consult to Physical/Occupational Therapy    Impaired functional mobility, balance, gait, and endurance     Able to ambulate, but has low muscle mass. Has normal albumin  Referred to outpatient at home [facility] PT            Health Maintenance         Date Due Completion Date    Shingles Vaccine (1 of 2) Never done ---    COVID-19 Vaccine (5 - Booster for Moderna series) 10/20/2022 8/25/2022    TETANUS VACCINE 06/15/2024 6/15/2014 (Done)    Override on 6/15/2014: Done    Lipid Panel 08/31/2027 8/31/2022            Future Appointments   Date Time Provider Department Center   8/17/2023 10:15 AM Maria Luz Toney MD [facility] DERM [address]         Follow up ROUTINE. Total clinical care time was 30 min, issues addressed include: EST CARE      Sayda Rendon MD/MPH  [facility]  [phone] [employer]

## 2023-05-16 NOTE — PATIENT INSTRUCTIONS
TODAY:  - ENT appt for ears  - outpatient at home physical therapy  - redoing the pill packs  - use your CPAP please!  - COVID booster    Morning pockets:   Cerovite Multi-vitamin   Galantamine 4 mg   Losartan 50 mg   Metoprolol succinate ER 50 mg-> REDUCE TO 25mg   Myrbetriq 25 mg -> STOP  Omeprazole 40 mg   Sertraline 100 mg   Vitamin B12 1,000 mcg   Vitamin D3 5,000 IU -> REDUCE TO 1000mg     Evening pockets:   Atorvastatin 40 mg   Galantamine 4 mg   Melatonin 5 mg   Montelukast 10 mg-> STOP

## 2023-05-16 NOTE — PROGRESS NOTES
Daughter brought back old packs for re-packing:  Adherence re-packed for 14 days using Dispill:   (Compressed monthly packing)      Morning pockets:   Cerovite Multi-vitamin   Galantamine 4 mg   Losartan 50 mg   Metoprolol succinate ER 25 mg - reduced from 50 mg   Omeprazole 40 mg   Sertraline 100 mg   Vitamin B12 1,000 mcg   Vitamin D3 1,000 IU - reduced from 5,000 IU      Evening pockets:   Atorvastatin 40 mg   Galantamine 4 mg   Melatonin 5 mg         *Removed Myrbetriq 25 mg from morning pockets and Montelukast 10 mg from evening pockets per MD request.       *Daughter waited at the pharmacy for re-packing.

## 2023-05-16 NOTE — PROGRESS NOTES
Primary Care Provider Appointment - [facility]  SHARED NOTE: Rosalind Cee (MS4), Dr Rendon (Attending)    Subjective:      Patient ID: Hu Lopez is a 87 y.o. male with with PMHx of Alzheimer's disease, HTN, HLD, Prostate cancer s/p resection, OAB and anemia who presents to the clinic today for evaluation.        Chief Complaint: Follow-Up Visit    Prior to this visit, patient's last encounter with PCP was Visit date not found.    He is accompanied to clinic today by his daughter.  Pt's main complaint today is his worsening vision. 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 He is being followed by retinal specialist, Dr. Huerta, where receives injection in the right eye every 4-6 weeks.  He has also been complaining of nocturia (about 4eps per night). Pt has a hx of OAB and was previously being treated with oxybutynin. However, this was switched to mirabrgeon as there were an interaction with his galantamine medication. Pt also complaining of fatigue and chills. He has a hx of CALVIN, but does not use his CPAP. Pt advised to use his CPAP as this might help alleviate symptoms of nocturia and fatigue.   Pt also has a hx of bilateral hearing loss but only wears one hearing aid after losing the other. Patient encouraged to follow up w/ ENT for evaluation and a new pair.   Pt has a hx of chronic fungal toe nail infection and would like to get treated for this.         HTN  - Currently taking: Losartan 50 mg , Metoprolol succinate ER 50 mg     - Today, BP is: 127/68     HLD  - Currently taking: Atorvastatin 40 mg   - Last lipid panel was on 08/25/22  The ASCVD Risk score (José Manuel HAYDEN, et al., 2019) failed to calculate for the following reasons:    The 2019 ASCVD risk score is only valid for ages 40 to 79                      Mental Health  - Currently taking: Sertraline 100 mg   - - Social support system  consists of wife, children and grandchildren, lives at home and has a caretaker.  w   Specialty notes (if they see heme/onc, GI, ortho, ophth, derm, etc...)   -Pt was recently seen by dermatologist, Maria Luz Toney MD , on 05/04 for excision of an inclusion cyst on his left lateral eyelid.     Care Gaps:  - Pt to receive shingles vaccine after today's appointment    Medications: {pillpacks:72887}  Medications: Does have pill packs  Compressed monthly packing)      Morning pockets:   Cerovite Multi-vitamin   Galantamine 4 mg   Losartan 50 mg   Metoprolol succinate ER 50 mg-> REDUCE TO 25mg   Myrbetriq 25 mg -> STOP  Omeprazole 40 mg   Sertraline 100 mg   Vitamin B12 1,000 mcg   Vitamin D3 5,000 IU -> REDUCE TO 1000mg     Evening pockets:   Atorvastatin 40 mg   Galantamine 4 mg   Melatonin 5 mg   Montelukast 10 mg-> STOP           4Ms for Medical Decision-Making in Older Adults    Last Completed EAWV: None    MOBILITY:  Get Up and Go:  No flowsheet data found.  Activities of Daily Living:  No flowsheet data found.  Whisper Test:  No flowsheet data found.  Disability Status:  No flowsheet data found.  Nutrition Screening:  No flowsheet data found. Screening Score: 0-7 Malnourished, 8-11 At Risk, 12-14 Normal    MENTATION:   Depression Patient Health Questionnaire:  Depression Patient Health Questionnaire 5/16/2023   Over the last two weeks how often have you been bothered by little interest or pleasure in doing things More than half the days   Over the last two weeks how often have you been bothered by feeling down, depressed or hopeless More than half the days   PHQ-2 Total Score 4   PHQ-4 Total Score -   PHQ-9 Total Score 9   PHQ-9 Interpretation Mild     Has Dementia Dx: Yes    Cognitive Function Screening:  No flowsheet data found.  Cognitive Function Screening Total - Less than 4 = Abnormal,  Greater than or equal to 4 = Normal    MEDICATIONS:  High Risk Medications:  Total Active Medications: 1  sertraline -  "100 MG    WHAT MATTERS MOST:  Advance Care Planning   ACP Status:   Patient has had an ACP conversation  Living Will: No  Power of : No  La[place]: No    What is most important right now is to focus on {ACPPTFOUCS:61953}    Accordingly, we have decided that the best plan to meet the patient's goals includes {ACPPTFOUCS2:77828}      What matters most to patient today is: ***       {Please use this survey link to leave feedback on the usability of the 4M SmartPhrase.:[id number]}          Social History     Socioeconomic History    Marital status:    Tobacco Use    Smoking status: Never    Smokeless tobacco: Never   Substance and Sexual Activity    Alcohol use: Yes     Alcohol/week: 0.0 standard drinks     Comment: rare    Drug use: No    Sexual activity: Never   Social History Narrative    3 children - healthy.       Review of Systems   Constitutional:  Positive for chills and fatigue.   HENT:  Negative for hearing loss.    Eyes:  Negative for pain.   Respiratory:  Negative for shortness of breath.    Cardiovascular:  Positive for leg swelling (chronic). Negative for chest pain.   Gastrointestinal:  Negative for abdominal distention and abdominal pain.   Genitourinary:  Positive for frequency.   Musculoskeletal:  Negative for arthralgias and back pain.   Skin:  Negative for wound.   Neurological:  Negative for light-headedness and headaches.     Objective:   /68 (BP Location: Left arm, Patient Position: Sitting, BP Method: Large (Automatic))   Pulse 69   Temp 98.3 °F (36.8 °C) (Oral)   Ht 5' 11" (1.803 m)   Wt 88.2 kg (194 lb 7.1 oz)   SpO2 98%   BMI 27.12 kg/m²     Physical Exam  Constitutional:       General: He is not in acute distress.     Appearance: Normal appearance. He is not ill-appearing.   HENT:      Head: Normocephalic and atraumatic.      Mouth/Throat:      Mouth: Mucous membranes are moist.   Eyes:      Extraocular Movements: Extraocular movements intact.   Cardiovascular:      Rate " and Rhythm: Normal rate and regular rhythm.      Pulses: Normal pulses.      Heart sounds: Normal heart sounds.   Pulmonary:      Effort: Pulmonary effort is normal.      Breath sounds: Normal breath sounds.   Abdominal:      General: Abdomen is flat.      Tenderness: There is no abdominal tenderness.   Musculoskeletal:      Cervical back: Normal range of motion.      Right lower leg: Edema present.      Left lower leg: Edema present.   Skin:     Coloration: Skin is not jaundiced.      Findings: Bruising (small echymoses noted on extremities) present.   Neurological:      Mental Status: He is alert.           Lab Results   Component Value Date    WBC 5.62 08/31/2022    HGB 13.5 (L) 08/31/2022    HCT 39.2 (L) 08/31/2022     08/31/2022    CHOL 142 08/25/2022    TRIG 87 08/25/2022    HDL 42 08/25/2022    ALT 21 08/31/2022    AST 21 08/31/2022     08/31/2022    K 4.3 08/31/2022     08/31/2022    CREATININE 1.8 (H) 08/31/2022    BUN 28 (H) 08/31/2022    CO2 23 08/31/2022    TSH 1.779 08/31/2022    PSA 0.34 06/20/2017    INR 1.0 06/04/2012    HGBA1C 5.5 06/20/2017       Current Outpatient Medications on File Prior to Visit   Medication Sig Dispense Refill    atorvastatin (LIPITOR) 40 MG tablet Take 1 tablet (40 mg total) by mouth once daily. 30 tablet 11    desonide (DESOWEN) 0.05 % cream       dextran 70-hypromellose (TEARS) ophthalmic solution Place 1 drop into both eyes as needed. 30 mL 6    galantamine (RAZADYNE) 4 MG Tab Take 1 tablet (4 mg total) by mouth 2 (two) times daily with meals. 60 tablet 11    iron-vitamin C 100-250 mg, ICAR-C, 100-250 mg Tab Take 1 tablet by mouth once daily. for 90 days 90 tablet 3    losartan (COZAAR) 50 MG tablet Take 1 tablet (50 mg total) by mouth once daily. 90 tablet 3    mirabegron (MYRBETRIQ) 25 mg Tb24 ER tablet Take 1 tablet (25 mg total) by mouth once daily. 90 tablet 3    montelukast (SINGULAIR) 10 mg tablet Take 1 tablet (10 mg total) by mouth every  evening. 30 tablet 11    multivit-min-FA-lycopen-lutein (CEROVITE SENIOR) 0.4 mg-300 mcg- 250 mcg Tab Take 1 tablet by mouth once daily. 90 tablet 3    mupirocin (BACTROBAN) 2 % ointment Apply daily to hand, cover with bandage. 22 g 3    omeprazole (PRILOSEC) 40 MG capsule Take 1 capsule (40 mg total) by mouth every morning. 30 capsule 11    polyethylene glycol (GLYCOLAX) 17 gram PwPk Take 17 g by mouth once daily. 30 packet 11    PROCTOFOAM HC rectal foam USE ONE APPLICATIORFUL RECTALLY TWICE DAILY 10 g 3    sertraline (ZOLOFT) 100 MG tablet Take 1 tablet (100 mg total) by mouth once daily. 90 tablet 3    suvorexant (BELSOMRA) 10 mg Tab Take 1 tablet by mouth every evening. 30 tablet 3    [DISCONTINUED] metoprolol succinate (TOPROL-XL) 50 MG 24 hr tablet Take 1 tablet (50 mg total) by mouth once daily. 30 tablet 11    triamcinolone acetonide 0.025 % Lotn Apply 1 application topically 2 (two) times daily. 60 mL 0    [DISCONTINUED] multivitamin (THERAGRAN) per tablet Take 1 tablet by mouth once daily. 90 tablet 3     No current facility-administered medications on file prior to visit.         Assessment:   87 y.o. male with multiple co-morbid illnesses here to follow-up with PCP and continue work-up of chronic issues    Plan:     Problem List Items Addressed This Visit          Psychiatric    Depression, recurrent    Relevant Medications    vitamin D (VITAMIN D3) 1000 units Tab       ENT    Mixed conductive and sensorineural hearing loss of both ears     New with wearing hearing aids  Lost one of the hearing aids  Needs new set         Relevant Orders    Ambulatory referral/consult to ENT    Ambulatory referral/consult to Audiology       Cardiac/Vascular    SVT (supraventricular tachycardia)     Taking metoprolol, unclear if SVT is the reason for this. Has orthostasis  Decrease metoprolol to 25mg.         Relevant Medications    metoprolol succinate (TOPROL-XL) 25 MG 24 hr tablet       Renal/    Stage 3b chronic  kidney disease       Hematology    Pulmonary embolism, unspecified chronicity, unspecified pulmonary embolism type, unspecified whether acute cor pulmonale present       Oncology    Prostate cancer       Endocrine    Sequelae of protein-calorie malnutrition - Primary    Other disorders of sulfur-bearing amino-acid metabolism       Other    Knee injury, left, initial encounter    Relevant Orders    Ambulatory referral/consult to Physical/Occupational Therapy       Health Maintenance         Date Due Completion Date    COVID-19 Vaccine (5 - Booster for Moderna series) 12/06/2022 10/11/2022    Shingles Vaccine (2 of 2) 07/11/2023 5/16/2023    TETANUS VACCINE 06/15/2024 6/15/2014 (Done)    Override on 6/15/2014: Done    Lipid Panel 08/31/2027 8/31/2022            Future Appointments   Date Time Provider Department Center   8/17/2023 10:15 AM Maria Luz Toney MD [facility] DERM [address]         Follow up ROUTINE. Total clinical care time was 60 min, issues addressed include.      Sayda Rendon MD/MPH  [facility]  [phone] spectralink

## 2023-05-17 PROBLEM — E72.19 OTHER DISORDERS OF SULFUR-BEARING AMINO-ACID METABOLISM: Status: RESOLVED | Noted: 2023-05-16 | Resolved: 2023-05-17

## 2023-05-17 PROBLEM — D69.2 SENILE PURPURA: Status: ACTIVE | Noted: 2023-05-17

## 2023-05-17 PROBLEM — I26.99 PULMONARY EMBOLISM, UNSPECIFIED CHRONICITY, UNSPECIFIED PULMONARY EMBOLISM TYPE, UNSPECIFIED WHETHER ACUTE COR PULMONALE PRESENT: Status: RESOLVED | Noted: 2023-05-16 | Resolved: 2023-05-17

## 2023-05-17 NOTE — ASSESSMENT & PLAN NOTE
Able to ambulate, but has low muscle mass. Has normal albumin  · Referred to outpatient at [facility]

## 2023-05-17 NOTE — ASSESSMENT & PLAN NOTE
Albumin normal, but low muscle mass. Has electrolyte disturbances and appears frail  · Advised to eat a balanced diet   · Ordered PT at home

## 2023-05-17 NOTE — ASSESSMENT & PLAN NOTE
H/o radical prostatectomy. Most recent PSA 2, may have cancer recurrence  · Patient is of advanced age, and daughter (PoA) in agreement that additional interventions would not extend his life or add quality

## 2023-05-23 ENCOUNTER — TELEPHONE (OUTPATIENT)
Dept: PRIMARY CARE CLINIC | Facility: CLINIC | Age: 88
End: 2023-05-23
Payer: MEDICARE

## 2023-05-23 NOTE — TELEPHONE ENCOUNTER
Please let patient and his daughter know that his labs were stable. His kidney functions has slowly worsened over time. This is common with age, but his omeprazole might be worsening this. He is taking that for his reflux. If he can tolerate a reduction in the dose of his omeprazole, then we can do this. I can send a script for omeprazole 20mg if they agree.    To protect his kidneys he should continue to drink lots of water, and avoid eating salt. He should also avoid getting dehyrdrated by being out in the sun for a long time with no water.    Thanks,  BALWINDER

## 2023-05-23 NOTE — TELEPHONE ENCOUNTER
Called and spoke with patient's daughter.  Informed daughter that patient's labs were stable but that his kidney function is lowly worsening.  This is normal for a  person his age.  The pantaprazole, which he takes for reflux also affects this.  If patient wants to, Dr. Rendon stated that she can decrease it (pantaprazole) , to 20 mg.  Daughter stated that she will talk to patient prior to making this decision.      Instructed daughter to make sure that patient is remaining well hydrated and avoiding the heat,  Also avoid excess salt intake.  Daughter verbalized understanding and appreciation for phone call.

## 2023-05-26 NOTE — PROGRESS NOTES
Adherence packed for 28 days using Dispill:   (Compressed monthly packing)      Morning pockets:   Cerovite Multi-vitamin   Galantamine 4 mg   Losartan 50 mg   Metoprolol succinate ER 25 mg  Omeprazole 40 mg   Sertraline 100 mg   Vitamin B12 1,000 mcg   Vitamin D3 1,000 IU     Evening pockets:   Atorvastatin 40 mg   Galantamine 4 mg   Melatonin 5 mg   Montelukast 10 mg - newly added  Myrbetriq 25 mg - newly added

## 2023-06-02 ENCOUNTER — PATIENT MESSAGE (OUTPATIENT)
Dept: OTOLARYNGOLOGY | Facility: CLINIC | Age: 88
End: 2023-06-02
Payer: MEDICARE

## 2023-06-19 NOTE — PROGRESS NOTES
Adherence packed for 28 days using Dispill:   (Compressed monthly packing)      Morning pockets:   Cerovite Multi-vitamin   Galantamine 4 mg   Losartan 50 mg   Metoprolol succinate ER 25 mg  Omeprazole 40 mg   Sertraline 100 mg   Vitamin B12 1,000 mcg   Vitamin D3 1,000 IU     Evening pockets:   Atorvastatin 40 mg   Galantamine 4 mg   Melatonin 5 mg   Montelukast 10 mg   Myrbetriq 25 mg

## 2023-06-28 ENCOUNTER — TELEPHONE (OUTPATIENT)
Dept: PRIMARY CARE CLINIC | Facility: CLINIC | Age: 88
End: 2023-06-28
Payer: MEDICARE

## 2023-06-28 NOTE — PROGRESS NOTES
Adherence REpacked for 26 days using Dispill:   (Compressed monthly packing)     Morning pockets:   Cerovite Multi-vitamin   Galantamine 4 mg   Losartan 50 mg   Metoprolol succinate ER 25 mg   Omeprazole 40 mg   Sertraline 100 mg   Vitamin B12 1,000 mcg   Vitamin D3 1,000 IU     Evening pockets:   Atorvastatin 40 mg   Galantamine 4 mg   Melatonin 5 mg          Daughter presented to pharmacy with 26 days of pillpacks and notes from MD with changes. Metoprolol already reduced to 25mg and Vitamin D to 1000mg - see above. Montelukast and Myrbetriq REMOVED per notes. She waited and left with these adjustments. TTN

## 2023-06-28 NOTE — TELEPHONE ENCOUNTER
Pill pack changes as follows, these changes are reflected in new pill packs. Med list updated to reflect the following:     Morning pockets:   Cerovite Multi-vitamin   Galantamine 4 mg   Losartan 50 mg   Metoprolol succinate ER 50 mg-> REDUCE TO 25mg   Myrbetriq 25 mg -> STOP  Omeprazole 40 mg   Sertraline 100 mg   Vitamin B12 1,000 mcg   Vitamin D3 5,000 IU -> REDUCE TO 1000mg     Evening pockets:   Atorvastatin 40 mg   Galantamine 4 mg   Melatonin 5 mg   Montelukast 10 mg-> STOP    PCP still needs to speak with daughter again about reducing the omeprazole.    Thanks,  Dr BRITT

## 2023-07-07 NOTE — PROGRESS NOTES
Adherence REpacked for 28 days using Dispill:   (Compressed monthly packing)      Morning pockets:   Cerovite Multi-vitamin   Galantamine 4 mg   Losartan 50 mg   Metoprolol succinate ER 25 mg   Omeprazole 40 mg   Sertraline 100 mg   Vitamin B12 1,000 mcg   Vitamin D3 1,000 IU      Evening pockets:   Atorvastatin 40 mg   Galantamine 4 mg   Melatonin 5 mg          *Sent to home address via same-day delivery.

## 2023-07-10 ENCOUNTER — TELEPHONE (OUTPATIENT)
Dept: OTOLARYNGOLOGY | Facility: CLINIC | Age: 88
End: 2023-07-10
Payer: MEDICARE

## 2023-07-10 ENCOUNTER — OFFICE VISIT (OUTPATIENT)
Dept: OTOLARYNGOLOGY | Facility: CLINIC | Age: 88
End: 2023-07-10
Payer: MEDICARE

## 2023-07-10 DIAGNOSIS — Z46.1 HEARING AID FITTING OR ADJUSTMENT: ICD-10-CM

## 2023-07-10 DIAGNOSIS — H91.93 BILATERAL HEARING LOSS, UNSPECIFIED HEARING LOSS TYPE: Primary | ICD-10-CM

## 2023-07-10 PROCEDURE — 99999 PR PBB SHADOW E&M-EST. PATIENT-LVL IV: ICD-10-PCS | Mod: PBBFAC,,, | Performed by: OTOLARYNGOLOGY

## 2023-07-10 PROCEDURE — 99214 OFFICE O/P EST MOD 30 MIN: CPT | Mod: PBBFAC | Performed by: OTOLARYNGOLOGY

## 2023-07-10 PROCEDURE — 99999 PR PBB SHADOW E&M-EST. PATIENT-LVL IV: CPT | Mod: PBBFAC,,, | Performed by: OTOLARYNGOLOGY

## 2023-07-10 PROCEDURE — 99203 PR OFFICE/OUTPT VISIT, NEW, LEVL III, 30-44 MIN: ICD-10-PCS | Mod: S$PBB,,, | Performed by: OTOLARYNGOLOGY

## 2023-07-10 PROCEDURE — 99203 OFFICE O/P NEW LOW 30 MIN: CPT | Mod: S$PBB,,, | Performed by: OTOLARYNGOLOGY

## 2023-07-10 NOTE — PATIENT INSTRUCTIONS
DERMOTIC/FLUOCINOLONE OIL    Use prescribed fluocinolone/derm otic oil to both ears smearing around the outer ear scaling areas as well as down in the ear canal twice daily for a week no more than 2. At this point follow a routine ear care as outlined.  If not improved with this dose of steroid a higher concentration steroid may prove necessary.      ROUTINE EAR CARE    Keep the ears dry in general.  Water and soap dry the ears increases scaling by stripping away the natural oils of the ears.    A twisted single ply of facial tissue can be used to wick out moisture on the rare occasion when water gets stuck in the ear; or the water can be displaced with concentrated alcohol like OTC SwimEar or a few drops of 72-95% isopropyl alcohol to fill the ear canal.  Regular use will cause excess drying of the ears.    The ear should be kept moist in general with mineral oil. Three to four drops into the ear canal 2 to 3 times a week or even every night.    If the ears need to be irrigated use either a 50 50 mixture of white vinegar and distilled water or 50 50 mixture of alcohol and white vinegar.    Painful draining ears that do not resolve with conservative care could represent infection and may need microscopic office debridement/clearing of the wax, and topical antibiotic drops with or without steroids may need to be prescribed.      Return with any worsening of symptoms, failure to improve, or any other concerns for further evaluation and treatment.

## 2023-07-10 NOTE — TELEPHONE ENCOUNTER
"----- Message from Julio Dinero sent at 7/10/2023  9:25 AM CDT -----  Regarding: appt; LATE  Contact: Ekaterina (daughter) @ [phone]  Caller, Ekaterina "Xochitl"is calling to advise the office of running late for pt's appt today. Pt is still asking to be seen. Caller is currently in the elevator on the way to the clinic. Please call to advise. Thanks.     Provider: Dr. Bond    Appt time: 9:20am    ETA: 9:35am         "

## 2023-07-10 NOTE — PROGRESS NOTES
[facility]    Subjective:      Patient: Hu Lopez Patient PCP: Sayda Rendon MD         :  1935     Sex:  male      MRN:  [id number]          Date of Visit: 07/10/2023      Chief Complaint: Cerumen Impaction      Patient ID: Hu Lopez is a 87 y.o. male with referred to me by Dr. Sayda Rendon in consultation for ear fullness/wax and hearing loss.  Dissatisfied with HA benefits.  No audiogram for review.  BILLS's from hospitals.  Bothered by itching ears.  Uses EtOH on Q tips in ears.  No pain or drainage.        Review of Systems     Past Medical History  He has a past medical history of Allergy, Basal cell carcinoma, BPH (benign prostatic hyperplasia), CAD (coronary artery disease), Cataracts, bilateral, Diverticulitis, DVT (deep venous thrombosis), Elevated PSA, GERD (gastroesophageal reflux disease), Glaucoma, Hyperlipidemia, Hypertension, Prostate cancer, Skin cancer, and Sleep apnea.    Family / Surgical / Social History  His family history includes Colon cancer in his paternal uncle; Depression in his paternal uncle; Heart attack (age of onset: 70) in his brother; No Known Problems in his mother; Prostate cancer in his father; Stroke in his paternal uncle.    Past Surgical History:   Procedure Laterality Date    APPENDECTOMY      COLON SURGERY      ,    COLONOSCOPY      11    COLOSTOMY      PROSTATE SURGERY      REVISION COLOSTOMY      SKIN CANCER EXCISION      chin    UPPER GASTROINTESTINAL ENDOSCOPY      11       Social History     Tobacco Use    Smoking status: Never    Smokeless tobacco: Never   Substance and Sexual Activity    Alcohol use: Yes     Alcohol/week: 0.0 standard drinks     Comment: rare    Drug use: No    Sexual activity: Never       Medications  He has a current medication list which includes the following prescription(s): atorvastatin, desonide, dextran 70-hypromellose, galantamine, iron-vitamin c 100-250 mg (icar-c), losartan, metoprolol succinate,  cerovite senior, mupirocin, omeprazole, polyethylene glycol, proctofoam hc, sertraline, vitamin d, triamcinolone acetonide, and [DISCONTINUED] multivitamin.      Allergies  Review of patient's allergies indicates:   Allergen Reactions    Codeine Nausea And Vomiting    Simcor [niacin-simvastatin] Rash    Simvastatin Rash       All medications, allergies, and past history have been reviewed.    Objective:      Vitals:  Vitals - 1 value per visit 5/16/2023 5/16/2023 7/10/2023   SYSTOLIC - 127 -   DIASTOLIC - 68 -   Pulse - 69 -   Temp - 98.3 -   Resp - - -   SPO2 - 98 -   Weight (lb) - 194.45 -   Weight (kg) - 88.2 -   Height - 71 -   BMI (Calculated) - 27.1 -   VISIT REPORT - - -   Pain Score  0 - 0   Some recent data might be hidden       There is no height or weight on file to calculate BSA.    Physical Exam:    GENERAL  APPEARANCE -  alert, appears stated age, and cooperative  BARRIER(S) TO COMMUNICATION -  none VOICE - appropriate for age and gender    INTEGUMENTARY  no suspicious head and neck lesions    HEENT  HEAD: Normocephalic, without obvious abnormality, atraumatic  FACE: INSPECTION - Symmetric, no signs of trauma, no suspicious lesion(s)  STRENGTH - facial symmetry    EYES  Normal occular alignment and mobility with no visible nystagmus at rest    EARS/NOSE/MOUTH/THROAT  EARS  PINNAE AND EXTERNAL EARS - no suspicious lesion OTOSCOPIC EXAM (surgical microscopy was not used for visualization/instrumentation): EAR EXAM - minimal excess wax curetted clear w/o difficulty to dry EAC's and normal appearing TM's and ME's  HEARING - poor.  Needs visual cues.    NOSE AND SINUSES  EXTERNAL NOSE - Grossly normal for age/sex      CHEST AND LUNG   INSPECTION & AUSCULTATION - normal effort, no stridor    CARDIOVASCULAR  AUSCULTATION & PERIPHERAL VASCULAR - regular rate and rhythm.    NEUROLOGIC  MENTAL STATUS - alert, interactive CRANIAL NERVES - normal          Procedure(s):  None    Labs:  WBC   Date Value Ref Range  Status   05/16/2023 5.92 3.90 - 12.70 K/uL Final     Hemoglobin   Date Value Ref Range Status   05/16/2023 13.4 (L) 14.0 - 18.0 g/dL Final     Platelets   Date Value Ref Range Status   05/16/2023 220 150 - 450 K/uL Final     Creatinine   Date Value Ref Range Status   05/16/2023 1.5 (H) 0.5 - 1.4 mg/dL Final     TSH   Date Value Ref Range Status   05/16/2023 1.571 0.400 - 4.000 uIU/mL Final     Glucose   Date Value Ref Range Status   05/16/2023 88 70 - 110 mg/dL Final     Hemoglobin A1C   Date Value Ref Range Status   06/20/2017 5.5 4.0 - 5.6 % Final     Comment:     According to ADA guidelines, hemoglobin A1c <7.0% represents  optimal control in non-pregnant diabetic patients. Different  metrics may apply to specific patient populations.   Standards of Medical Care in Diabetes-2016.  For the purpose of screening for the presence of diabetes:  <5.7%     Consistent with the absence of diabetes  5.7-6.4%  Consistent with increasing risk for diabetes   (prediabetes)  >or=6.5%  Consistent with diabetes  Currently, no consensus exists for use of hemoglobin A1c  for diagnosis of diabetes for children.  This Hemoglobin A1c assay has significant interference with fetal   hemoglobin   (HbF). The results are invalid for patients with abnormal amounts of   HbF,   including those with known Hereditary Persistence   of Fetal Hemoglobin. Heterozygous hemoglobin variants (HbAS, HbAC,   HbAD, HbAE, HbA2) do not significantly interfere with this assay;   however, presence of multiple variants in a sample may impact the %   interference.           Assessment:      Problem List Items Addressed This Visit          ENT    Mixed conductive and sensorineural hearing loss of both ears - Primary    Overview     New with wearing hearing aids          Other Visit Diagnoses       Hearing aid fitting or adjustment                     Plan:        We discussed hearing loss at length.  He needs to see an audiologist with his hearing aids determine  if he has the proper fitting.  I mentioned the possibility of cochlear implantation if criteria are met audiologic only.  No evidence of wax impaction.  Derm otic oil for itching and routine ear care for more routine ca

## 2023-08-01 NOTE — PROGRESS NOTES
Adherence REpacked for 28 days using Dispill:   (Compressed monthly packing)      Morning pockets:   Cerovite Multi-vitamin   Galantamine 4 mg   Losartan 50 mg   Metoprolol succinate ER 25 mg   Omeprazole 40 mg   Sertraline 100 mg   Vitamin B12 1,000 mcg   Vitamin D3 1,000 IU      Evening pockets:   Atorvastatin 40 mg   Galantamine 4 mg   Melatonin 5 mg         *To be sent to home address via same-day delivery on Wednesday, 8/2/2023.

## 2023-08-23 DIAGNOSIS — K30 NUD (NONULCER DYSPEPSIA): ICD-10-CM

## 2023-08-23 DIAGNOSIS — F02.80 ALZHEIMER'S DISEASE WITHOUT BEHAVIORAL DISTURBANCE: ICD-10-CM

## 2023-08-23 DIAGNOSIS — D50.8 OTHER IRON DEFICIENCY ANEMIA: ICD-10-CM

## 2023-08-23 DIAGNOSIS — G30.9 ALZHEIMER'S DISEASE WITHOUT BEHAVIORAL DISTURBANCE: ICD-10-CM

## 2023-08-23 RX ORDER — OMEPRAZOLE 40 MG/1
40 CAPSULE, DELAYED RELEASE ORAL EVERY MORNING
Qty: 30 CAPSULE | Refills: 11 | Status: SHIPPED | OUTPATIENT
Start: 2023-08-23

## 2023-08-23 RX ORDER — MULTIVIT-MIN/FA/LYCOPEN/LUTEIN .4-300-25
1 TABLET ORAL DAILY
Qty: 90 TABLET | Refills: 3 | Status: SHIPPED | OUTPATIENT
Start: 2023-08-23

## 2023-08-23 RX ORDER — GALANTAMINE 4 MG/1
4 TABLET, FILM COATED ORAL 2 TIMES DAILY WITH MEALS
Qty: 60 TABLET | Refills: 11 | Status: SHIPPED | OUTPATIENT
Start: 2023-08-23 | End: 2024-08-22

## 2023-09-19 DIAGNOSIS — C61 PROSTATE CANCER: Primary | ICD-10-CM

## 2023-09-21 DIAGNOSIS — E78.5 HYPERLIPIDEMIA, UNSPECIFIED HYPERLIPIDEMIA TYPE: ICD-10-CM

## 2023-09-21 DIAGNOSIS — I10 ESSENTIAL HYPERTENSION: ICD-10-CM

## 2023-09-22 ENCOUNTER — LAB VISIT (OUTPATIENT)
Dept: LAB | Facility: OTHER | Age: 88
End: 2023-09-22
Attending: UROLOGY
Payer: MEDICARE

## 2023-09-22 DIAGNOSIS — C61 PROSTATE CANCER: ICD-10-CM

## 2023-09-22 LAB — COMPLEXED PSA SERPL-MCNC: 4.5 NG/ML (ref 0–4)

## 2023-09-22 PROCEDURE — 36415 COLL VENOUS BLD VENIPUNCTURE: CPT | Performed by: UROLOGY

## 2023-09-22 PROCEDURE — 84153 ASSAY OF PSA TOTAL: CPT | Performed by: UROLOGY

## 2023-09-22 RX ORDER — ATORVASTATIN CALCIUM 40 MG/1
40 TABLET, FILM COATED ORAL DAILY
Qty: 30 TABLET | Refills: 11 | Status: SHIPPED | OUTPATIENT
Start: 2023-09-22

## 2023-09-22 RX ORDER — LOSARTAN POTASSIUM 50 MG/1
50 TABLET ORAL DAILY
Qty: 90 TABLET | Refills: 3 | Status: SHIPPED | OUTPATIENT
Start: 2023-09-22 | End: 2024-09-21

## 2023-09-28 ENCOUNTER — OFFICE VISIT (OUTPATIENT)
Dept: UROLOGY | Facility: CLINIC | Age: 88
End: 2023-09-28
Payer: MEDICARE

## 2023-09-28 VITALS
WEIGHT: 202.94 LBS | BODY MASS INDEX: 28.41 KG/M2 | HEIGHT: 71 IN | SYSTOLIC BLOOD PRESSURE: 138 MMHG | HEART RATE: 101 BPM | DIASTOLIC BLOOD PRESSURE: 79 MMHG

## 2023-09-28 DIAGNOSIS — C61 PROSTATE CANCER: Primary | ICD-10-CM

## 2023-09-28 DIAGNOSIS — F02.80 ALZHEIMER'S DISEASE WITHOUT BEHAVIORAL DISTURBANCE: ICD-10-CM

## 2023-09-28 DIAGNOSIS — G30.9 ALZHEIMER'S DISEASE WITHOUT BEHAVIORAL DISTURBANCE: ICD-10-CM

## 2023-09-28 PROCEDURE — 99213 OFFICE O/P EST LOW 20 MIN: CPT | Mod: PBBFAC | Performed by: UROLOGY

## 2023-09-28 PROCEDURE — 99999 PR PBB SHADOW E&M-EST. PATIENT-LVL III: ICD-10-PCS | Mod: PBBFAC,,, | Performed by: UROLOGY

## 2023-09-28 PROCEDURE — 99999 PR PBB SHADOW E&M-EST. PATIENT-LVL III: CPT | Mod: PBBFAC,,, | Performed by: UROLOGY

## 2023-09-28 PROCEDURE — 99214 PR OFFICE/OUTPT VISIT, EST, LEVL IV, 30-39 MIN: ICD-10-PCS | Mod: S$PBB,,, | Performed by: UROLOGY

## 2023-09-28 PROCEDURE — 99214 OFFICE O/P EST MOD 30 MIN: CPT | Mod: S$PBB,,, | Performed by: UROLOGY

## 2023-09-28 NOTE — PROGRESS NOTES
Clinic Note  9/28/2023      Subjective:         Chief Complaint:   HPI  Hu Lopez is a 87 y.o. male with a history of prostate cancer.  radical retropubic prostatectomy in 1980 by Dr. Mitchell. Rising PSA. Bone scan and CT scan in February 2016 did not show metastatic disease.  Patient complains of nocturia 4-5x/noc for last month. Started on Ditropan XL a year ago per symptoms improved.  Recent PSA 0.77 9/24/2018. Recent   Bilateral inguinal hernia surgery.   post void residual 46 ccs.    9/28/2023- Last visit here in 2019. PSA 4.5. Today he complains of weak stream, nocturia x3-4 and weak stream. Does not have daytime frequency.  He reports similar symptoms several years ago which was relieved by ditropan, which was subsequently discontinued by his neurologist. He was started on Myrbetriq which he still takes.     Lab Results   Component Value Date    PSA 0.34 06/20/2017    PSA 0.02 01/19/2012    PSADIAG 4.5 (H) 09/22/2023    PSADIAG 1.1 01/27/2020    PSADIAG 0.55 01/22/2019    PSADIAG 0.63 07/19/2018    PSADIAG 0.59 07/13/2018    PSADIAG 0.40 01/08/2018    PSADIAG 0.26 09/21/2016    PSADIAG 0.22 01/26/2016    PSATOTAL 2.0 08/25/2022    PSAFREE 0.87 08/25/2022    PSAFREEPCT 43.50 08/25/2022      Past Medical History:   Diagnosis Date    Allergy     Basal cell carcinoma     BPH (benign prostatic hyperplasia)     CAD (coronary artery disease)     Cataracts, bilateral     Diverticulitis     DVT (deep venous thrombosis)     Elevated PSA     GERD (gastroesophageal reflux disease)     Glaucoma     Hyperlipidemia     Hypertension     Prostate cancer     Skin cancer     Sleep apnea     CPAP     Family History   Problem Relation Age of Onset    Prostate cancer Father     Colon cancer Paternal Uncle     Stroke Paternal Uncle     Depression Paternal Uncle     No Known Problems Mother     Heart attack Brother 70     Social History     Socioeconomic History    Marital status:    Tobacco Use    Smoking status: Never     Smokeless tobacco: Never   Substance and Sexual Activity    Alcohol use: Yes     Alcohol/week: 0.0 standard drinks of alcohol     Comment: rare    Drug use: No    Sexual activity: Never   Social History Narrative    3 children - healthy.     Past Surgical History:   Procedure Laterality Date    APPENDECTOMY      COLON SURGERY      75,05    COLONOSCOPY      11    COLOSTOMY      PROSTATE SURGERY  2008    REVISION COLOSTOMY      SKIN CANCER EXCISION      chin    UPPER GASTROINTESTINAL ENDOSCOPY      11     Patient Active Problem List   Diagnosis    History of diverticulitis    GERD (gastroesophageal reflux disease)    CALVIN (obstructive sleep apnea)    Encysted hydrocele    History of prostate cancer    Nocturia    Essential hypertension    HLD (hyperlipidemia)    PARVEEN (generalized anxiety disorder)    Urgency of urination    Prostate cancer    Atherosclerosis of arteries of extremities    Environmental allergies    Aortic atherosclerosis    Ectatic aorta    OAB (overactive bladder)    Hx of radical prostatectomy    Weak urine stream    Onychomycosis    Iron deficiency anemia    Knee injury, left, initial encounter    Insomnia    Alzheimer's disease without behavioral disturbance    Stage 3b chronic kidney disease    Impaired functional mobility, balance, gait, and endurance    Alteration in self-care ability    Sequelae of protein-calorie malnutrition    Depression, recurrent    SVT (supraventricular tachycardia)    Mixed conductive and sensorineural hearing loss of both ears    Senile purpura     Review of Systems   Constitutional:  Negative for appetite change, chills, fatigue, fever and unexpected weight change.   HENT:  Negative for nosebleeds.    Respiratory:  Negative for shortness of breath and wheezing.    Cardiovascular:  Negative for chest pain, palpitations and leg swelling.   Gastrointestinal:  Negative for abdominal distention, abdominal pain, constipation, diarrhea, nausea and vomiting.   Genitourinary:   "Negative for dysuria and hematuria.   Musculoskeletal:  Negative for arthralgias and back pain.   Skin:  Negative for pallor.   Neurological:  Negative for dizziness, seizures and syncope.   Hematological:  Negative for adenopathy.   Psychiatric/Behavioral:  Negative for dysphoric mood.        Objective:      There were no vitals taken for this visit.  Estimated body mass index is 27.12 kg/m² as calculated from the following:    Height as of 5/16/23: 5' 11" (1.803 m).    Weight as of 5/16/23: 88.2 kg (194 lb 7.1 oz).  Physical Exam      Assessment and Plan:           Problem List Items Addressed This Visit       Prostate cancer - Primary       Follow up:   Discussed BCR (biochemical recurrence). Discussed surveillance, staging ADT. Recommend surveillance. F/U 6 months with PSA.  Stop andrea.    Royal Canas          "

## 2023-09-29 ENCOUNTER — OFFICE VISIT (OUTPATIENT)
Dept: PODIATRY | Facility: CLINIC | Age: 88
End: 2023-09-29
Payer: MEDICARE

## 2023-09-29 VITALS
BODY MASS INDEX: 28.39 KG/M2 | HEIGHT: 71 IN | SYSTOLIC BLOOD PRESSURE: 156 MMHG | DIASTOLIC BLOOD PRESSURE: 92 MMHG | WEIGHT: 202.81 LBS | HEART RATE: 102 BPM

## 2023-09-29 DIAGNOSIS — B35.1 ONYCHOMYCOSIS DUE TO DERMATOPHYTE: Primary | ICD-10-CM

## 2023-09-29 PROCEDURE — 17999 UNLISTD PX SKN MUC MEMB SUBQ: CPT | Mod: CSM,S$GLB,, | Performed by: PODIATRIST

## 2023-09-29 PROCEDURE — 99203 OFFICE O/P NEW LOW 30 MIN: CPT | Mod: S$PBB,,, | Performed by: PODIATRIST

## 2023-09-29 PROCEDURE — 99213 OFFICE O/P EST LOW 20 MIN: CPT | Mod: PBBFAC,PN | Performed by: PODIATRIST

## 2023-09-29 PROCEDURE — 17999 PR NON-COVERED FOOT CARE: ICD-10-PCS | Mod: CSM,S$GLB,, | Performed by: PODIATRIST

## 2023-09-29 PROCEDURE — 99999 PR PBB SHADOW E&M-EST. PATIENT-LVL III: ICD-10-PCS | Mod: PBBFAC,,, | Performed by: PODIATRIST

## 2023-09-29 PROCEDURE — 99203 PR OFFICE/OUTPT VISIT, NEW, LEVL III, 30-44 MIN: ICD-10-PCS | Mod: S$PBB,,, | Performed by: PODIATRIST

## 2023-09-29 PROCEDURE — 99999 PR PBB SHADOW E&M-EST. PATIENT-LVL III: CPT | Mod: PBBFAC,,, | Performed by: PODIATRIST

## 2023-09-29 RX ORDER — CICLOPIROX 80 MG/ML
SOLUTION TOPICAL NIGHTLY
Qty: 6.6 ML | Refills: 11 | Status: SHIPPED | OUTPATIENT
Start: 2023-09-29

## 2023-09-29 RX ORDER — CICLOPIROX 80 MG/ML
SOLUTION TOPICAL NIGHTLY
Qty: 6.6 ML | Refills: 11 | Status: SHIPPED | OUTPATIENT
Start: 2023-09-29 | End: 2023-09-29

## 2023-09-29 NOTE — PROGRESS NOTES
Subjective:      Patient ID: Hu Lopez is a 87 y.o. male.    Chief Complaint: Nail Care (With nail fungus)    Thick discolored misshapen uncomfortable toenails all 10 toes.  Gradual onset, worsening over many years.  Aggravated by increased weight-bearing prolonged standing some shoes.  No prior medical treatment.  No result from topical self-treatment with iodine.  Denies trauma and surgery both feet.    Review of Systems   Constitutional: Negative for chills, diaphoresis, fever, malaise/fatigue and night sweats.   Cardiovascular:  Negative for claudication, cyanosis, leg swelling and syncope.   Skin:  Positive for nail changes. Negative for color change, dry skin, rash, suspicious lesions and unusual hair distribution.   Musculoskeletal:  Negative for falls, joint pain, joint swelling, muscle cramps, muscle weakness and stiffness.   Gastrointestinal:  Negative for constipation, diarrhea, nausea and vomiting.   Neurological:  Negative for brief paralysis, disturbances in coordination, focal weakness, numbness, paresthesias, sensory change and tremors.         Objective:      Physical Exam  Constitutional:       General: He is not in acute distress.     Appearance: He is well-developed. He is not diaphoretic.   Cardiovascular:      Pulses:           Popliteal pulses are 2+ on the right side and 2+ on the left side.        Dorsalis pedis pulses are 2+ on the right side and 2+ on the left side.        Posterior tibial pulses are 2+ on the right side and 2+ on the left side.      Comments: Capillary refill 3 seconds all toes/distal feet, all toes/both feet warm to touch.      Negative lymphadenopathy bilateral popliteal fossa and tarsal tunnel.      Negavie lower extremity edema bilateral.    Musculoskeletal:      Right ankle: No swelling, deformity, ecchymosis or lacerations. Normal range of motion. Normal pulse.      Right Achilles Tendon: Normal. No defects. Ortiz's test negative.   Lymphadenopathy:       Lower Body: No right inguinal adenopathy. No left inguinal adenopathy.      Comments: Negative lymphadenopathy bilateral popliteal fossa and tarsal tunnel.    Negative lymphangitic streaking bilateral feet/ankles/legs.   Skin:     General: Skin is warm and dry.      Capillary Refill: Capillary refill takes 2 to 3 seconds.      Coloration: Skin is not pale.      Findings: No abrasion, bruising, burn, ecchymosis, erythema, laceration, lesion or rash.      Nails: There is no clubbing.      Comments:   Toenails 1st, 2nd, 3rd, 4th, 5th  bilateral are hypertrophic thickened 2-3 mm, dystrophic, discolored tanish brown with tan, gray crumbly subungual debris.  Tender to distal nail plate pressure, without periungual skin abnormality of each.    Otherwise, Skin is normal age and health appropriate color, turgor, texture, and temperature bilateral lower extremities without ulceration, hyperpigmentation, discoloration, masses nodules or cords palpated.  No ecchymosis, erythema, edema, or cardinal signs of infection bilateral lower extremities.     Neurological:      Mental Status: He is alert and oriented to person, place, and time.      Sensory: No sensory deficit.      Motor: No tremor, atrophy or abnormal muscle tone.      Gait: Gait normal.      Deep Tendon Reflexes:      Reflex Scores:       Patellar reflexes are 2+ on the right side and 2+ on the left side.       Achilles reflexes are 2+ on the right side and 2+ on the left side.     Comments: Negative tinel sign to percussion sural, superficial peroneal, deep peroneal, saphenous, and posterior tibial nerves right and left ankles and feet.     Psychiatric:         Behavior: Behavior is cooperative.           Assessment:       Encounter Diagnosis   Name Primary?    Onychomycosis due to dermatophyte Yes         Plan:       Hu was seen today for nail care.    Diagnoses and all orders for this visit:    Onychomycosis due to dermatophyte    Other orders  -      Discontinue: ciclopirox (PENLAC) 8 % Soln; Apply topically nightly.  -     ciclopirox (PENLAC) 8 % Soln; Apply topically nightly.      I counseled the patient on his conditions, their implications and medical management.        With the patient's permission, I debrided all ten toenails with a sterile nipper and curette, removing all offending nail and debris.  Patient tolerated the procedure well and related significant relief.    PEnlac.    Discussed conservative treatment with shoes of adequate dimensions, material, and style to alleviate symptoms and delay or prevent surgical intervention.           Follow up if symptoms worsen or fail to improve.

## 2023-10-01 ENCOUNTER — PATIENT MESSAGE (OUTPATIENT)
Dept: UROLOGY | Facility: CLINIC | Age: 88
End: 2023-10-01
Payer: COMMERCIAL

## 2023-10-01 DIAGNOSIS — C61 PROSTATE CANCER: Primary | ICD-10-CM

## 2023-10-09 ENCOUNTER — TELEPHONE (OUTPATIENT)
Dept: UROLOGY | Facility: CLINIC | Age: 88
End: 2023-10-09
Payer: COMMERCIAL

## 2023-10-16 NOTE — PROGRESS NOTES
Clinic Note  10/16/2023      Subjective:         Chief Complaint:   HPI  Hu Lopez is a 87 y.o. male with a history of prostate cancer.  radical retropubic prostatectomy in 1980 by Dr. Mitchell. Rising PSA. Bone scan and CT scan in February 2016 did not show metastatic disease.  Patient complains of nocturia 4-5x/noc for last month. Started on Ditropan XL a year ago per symptoms improved.  Recent PSA 0.77 9/24/2018. Recent   Bilateral inguinal hernia surgery.   post void residual 46 ccs.     9/28/2023- Last visit here in 2019. PSA 4.5. Today he complains of weak stream, nocturia x3-4 and weak stream. Does not have daytime frequency.  He reports similar symptoms several years ago which was relieved by ditropan, which was subsequently discontinued by his neurologist. He was started on Myrbetriq which he still takes.      10/17/2023- PSMA scan- no avid nodes or metastasis to my review.Not read by radiology yet.      Lab Results   Component Value Date    PSA 0.34 06/20/2017    PSA 0.02 01/19/2012    PSADIAG 4.5 (H) 09/22/2023    PSADIAG 1.1 01/27/2020    PSADIAG 0.55 01/22/2019    PSADIAG 0.63 07/19/2018    PSADIAG 0.59 07/13/2018    PSADIAG 0.40 01/08/2018    PSADIAG 0.26 09/21/2016    PSADIAG 0.22 01/26/2016    PSATOTAL 2.0 08/25/2022    PSAFREE 0.87 08/25/2022    PSAFREEPCT 43.50 08/25/2022      Past Medical History:   Diagnosis Date    Allergy     Basal cell carcinoma     BPH (benign prostatic hyperplasia)     CAD (coronary artery disease)     Cataracts, bilateral     Diverticulitis     DVT (deep venous thrombosis)     Elevated PSA     GERD (gastroesophageal reflux disease)     Glaucoma     Hyperlipidemia     Hypertension     Prostate cancer     Skin cancer     Sleep apnea     CPAP     Family History   Problem Relation Age of Onset    Prostate cancer Father     Colon cancer Paternal Uncle     Stroke Paternal Uncle     Depression Paternal Uncle     No Known Problems Mother     Heart attack Brother 70     Social  "History     Socioeconomic History    Marital status:    Tobacco Use    Smoking status: Never    Smokeless tobacco: Never   Substance and Sexual Activity    Alcohol use: Yes     Alcohol/week: 0.0 standard drinks of alcohol     Comment: rare    Drug use: No    Sexual activity: Never   Social History Narrative    3 children - healthy.     Past Surgical History:   Procedure Laterality Date    APPENDECTOMY      COLON SURGERY      75,05    COLONOSCOPY      11    COLOSTOMY      PROSTATE SURGERY  2008    REVISION COLOSTOMY      SKIN CANCER EXCISION      chin    UPPER GASTROINTESTINAL ENDOSCOPY      11     Patient Active Problem List   Diagnosis    History of diverticulitis    GERD (gastroesophageal reflux disease)    CALVIN (obstructive sleep apnea)    Encysted hydrocele    History of prostate cancer    Nocturia    Essential hypertension    HLD (hyperlipidemia)    PARVEEN (generalized anxiety disorder)    Urgency of urination    Prostate cancer    Atherosclerosis of arteries of extremities    Environmental allergies    Aortic atherosclerosis    Ectatic aorta    OAB (overactive bladder)    Hx of radical prostatectomy    Weak urine stream    Onychomycosis    Iron deficiency anemia    Knee injury, left, initial encounter    Insomnia    Alzheimer's disease without behavioral disturbance    Stage 3b chronic kidney disease    Impaired functional mobility, balance, gait, and endurance    Alteration in self-care ability    Sequelae of protein-calorie malnutrition    Depression, recurrent    SVT (supraventricular tachycardia)    Mixed conductive and sensorineural hearing loss of both ears    Senile purpura     Review of Systems      Objective:      There were no vitals taken for this visit.  Estimated body mass index is 28.29 kg/m² as calculated from the following:    Height as of 9/29/23: 5' 11" (1.803 m).    Weight as of 9/29/23: 92 kg (202 lb 13.2 oz).  Physical Exam      Assessment and Plan:           Problem List Items " Addressed This Visit       Prostate cancer - Primary       Follow up:   6 months with PSA    Royal Canas

## 2023-10-17 ENCOUNTER — HOSPITAL ENCOUNTER (OUTPATIENT)
Dept: RADIOLOGY | Facility: HOSPITAL | Age: 88
Discharge: HOME | End: 2023-10-17
Attending: UROLOGY
Payer: MEDICARE

## 2023-10-17 ENCOUNTER — OFFICE VISIT (OUTPATIENT)
Dept: UROLOGY | Facility: CLINIC | Age: 88
End: 2023-10-17
Payer: MEDICARE

## 2023-10-17 VITALS — DIASTOLIC BLOOD PRESSURE: 87 MMHG | RESPIRATION RATE: 20 BRPM | SYSTOLIC BLOOD PRESSURE: 134 MMHG | HEART RATE: 86 BPM

## 2023-10-17 DIAGNOSIS — C61 PROSTATE CANCER: ICD-10-CM

## 2023-10-17 DIAGNOSIS — C61 PROSTATE CANCER: Primary | ICD-10-CM

## 2023-10-17 PROCEDURE — 99999 PR PBB SHADOW E&M-EST. PATIENT-LVL III: CPT | Mod: PBBFAC,,, | Performed by: UROLOGY

## 2023-10-17 PROCEDURE — 78815 PET IMAGE W/CT SKULL-THIGH: CPT | Mod: TC,PS

## 2023-10-17 PROCEDURE — 99999 PR PBB SHADOW E&M-EST. PATIENT-LVL III: ICD-10-PCS | Mod: PBBFAC,,, | Performed by: UROLOGY

## 2023-10-17 PROCEDURE — 78815 NM PET CT F 18 PYL PSMA, MIDTHIGH TO VERTEX: ICD-10-PCS | Mod: 26,PI,, | Performed by: STUDENT IN AN ORGANIZED HEALTH CARE EDUCATION/TRAINING PROGRAM

## 2023-10-17 PROCEDURE — 99213 OFFICE O/P EST LOW 20 MIN: CPT | Mod: PBBFAC,25 | Performed by: UROLOGY

## 2023-10-17 PROCEDURE — 78815 PET IMAGE W/CT SKULL-THIGH: CPT | Mod: 26,PI,, | Performed by: STUDENT IN AN ORGANIZED HEALTH CARE EDUCATION/TRAINING PROGRAM

## 2023-10-17 PROCEDURE — 99214 PR OFFICE/OUTPT VISIT, EST, LEVL IV, 30-39 MIN: ICD-10-PCS | Mod: S$PBB,,, | Performed by: UROLOGY

## 2023-10-17 PROCEDURE — 99214 OFFICE O/P EST MOD 30 MIN: CPT | Mod: S$PBB,,, | Performed by: UROLOGY

## 2023-10-17 PROCEDURE — A9595 NM PET CT F 18 PYL PSMA, MIDTHIGH TO VERTEX: HCPCS | Mod: TB

## 2023-11-15 NOTE — TELEPHONE ENCOUNTER
----- Message from Ysabel Bolanos sent at 8/27/2018  1:16 PM CDT -----  Contact: patient  [phone]-please call above patient need to reschedule appointment   English

## 2023-12-14 DIAGNOSIS — F41.9 ANXIETY: ICD-10-CM

## 2023-12-14 NOTE — PROGRESS NOTES
Advance Directives:   Living Will: No    LaPOST: No    Medical Power of : No    Goals of Care: What is most important right now is to focus on spending time at home. Accordingly, we have decided that the best plan to meet the patient's goals includes continuing with treatment.

## 2023-12-15 RX ORDER — SERTRALINE HYDROCHLORIDE 100 MG/1
100 TABLET, FILM COATED ORAL DAILY
Qty: 90 TABLET | Refills: 3 | Status: SHIPPED | OUTPATIENT
Start: 2023-12-15

## 2023-12-26 ENCOUNTER — TELEPHONE (OUTPATIENT)
Dept: UROLOGY | Facility: CLINIC | Age: 88
End: 2023-12-26

## 2023-12-26 NOTE — TELEPHONE ENCOUNTER
Called pt in regards to rescheduling appt on 1/12/24 at 2:00 pm. No answer, left detailed message letting pt know that the first available appt is 2/2/24 at 2:00 pm.

## 2024-01-11 DIAGNOSIS — Z00.00 ENCOUNTER FOR MEDICARE ANNUAL WELLNESS EXAM: ICD-10-CM

## 2024-02-07 ENCOUNTER — TELEPHONE (OUTPATIENT)
Dept: UROLOGY | Facility: CLINIC | Age: 89
End: 2024-02-07

## 2024-02-07 ENCOUNTER — PATIENT MESSAGE (OUTPATIENT)
Dept: RESEARCH | Facility: HOSPITAL | Age: 89
End: 2024-02-07
Payer: MEDICARE

## 2024-02-07 ENCOUNTER — OFFICE VISIT (OUTPATIENT)
Dept: UROLOGY | Facility: CLINIC | Age: 89
End: 2024-02-07
Payer: MEDICARE

## 2024-02-07 ENCOUNTER — PATIENT MESSAGE (OUTPATIENT)
Dept: NEUROLOGY | Facility: CLINIC | Age: 89
End: 2024-02-07
Payer: MEDICARE

## 2024-02-07 VITALS
HEIGHT: 71 IN | DIASTOLIC BLOOD PRESSURE: 78 MMHG | BODY MASS INDEX: 29.23 KG/M2 | SYSTOLIC BLOOD PRESSURE: 160 MMHG | WEIGHT: 208.75 LBS | HEART RATE: 94 BPM

## 2024-02-07 DIAGNOSIS — C61 PROSTATE CANCER: ICD-10-CM

## 2024-02-07 DIAGNOSIS — N32.81 OAB (OVERACTIVE BLADDER): Primary | ICD-10-CM

## 2024-02-07 PROCEDURE — 99214 OFFICE O/P EST MOD 30 MIN: CPT | Mod: S$GLB,,, | Performed by: UROLOGY

## 2024-02-07 PROCEDURE — 3288F FALL RISK ASSESSMENT DOCD: CPT | Mod: CPTII,S$GLB,, | Performed by: UROLOGY

## 2024-02-07 PROCEDURE — 99999 PR PBB SHADOW E&M-EST. PATIENT-LVL IV: CPT | Mod: PBBFAC,,, | Performed by: UROLOGY

## 2024-02-07 PROCEDURE — 1159F MED LIST DOCD IN RCRD: CPT | Mod: CPTII,S$GLB,, | Performed by: UROLOGY

## 2024-02-07 PROCEDURE — 1126F AMNT PAIN NOTED NONE PRSNT: CPT | Mod: CPTII,S$GLB,, | Performed by: UROLOGY

## 2024-02-07 PROCEDURE — 1100F PTFALLS ASSESS-DOCD GE2>/YR: CPT | Mod: CPTII,S$GLB,, | Performed by: UROLOGY

## 2024-02-07 RX ORDER — VIBEGRON 75 MG/1
75 TABLET, FILM COATED ORAL DAILY
Qty: 30 TABLET | Refills: 11 | Status: SHIPPED | OUTPATIENT
Start: 2024-02-07 | End: 2024-02-17 | Stop reason: SDUPTHER

## 2024-02-07 RX ORDER — VIBEGRON 75 MG/1
75 TABLET, FILM COATED ORAL DAILY
Qty: 30 TABLET | Refills: 11 | Status: SHIPPED | OUTPATIENT
Start: 2024-02-07 | End: 2024-02-07

## 2024-02-07 NOTE — PROGRESS NOTES
[facility]      New Overactive Bladder (OAB) Note    2/7/2024    Referred by:  Royal Canas MD    HPI: Hu Lopez is a very pleasant 88 y.o. male who has not previously been seen by an [facility] specialist in our department referred for evaluation of urinary incontinence of several years duration. He reports bother is associated with urinary frequency (24hrs) (7-8x daily), with nocturia (4x per night) and with urgency that does not result in urinary incontinence. He reports no stress urinary incontinence associated with exertion. He does not require daily pad use.  He has not made changes to fluid intake.  He reports urinary frequency is only at night. Patient reports urgency is independent of position.     He denies symptoms of irritative voiding including dysuria. He denies symptoms of obstructive voiding including decreased stream, hesitancy, intermittency, post void dribbling, and sense of incomplete emptying. Bladder scan PVR was 0 mL. He has a history of prostate cancer as described in detail in Dr. Canas's recent note; reviewed today.     Previous incontinence therapies:  Behavioral Therapies  None  Medications  oral oxybutynin ER 10 mg 6 months (provided no benefit)  mirabegron (Myrbetriq) 50 mg 4 months (provided no benefit)  Other Therapies  No Other Therapies    A review of 10+ systems was conducted with pertinent positive and negative findings documented in HPI with all other systems reviewed and negative.    Past medical, family, surgical and social history reviewed as documented in chart with pertinent positive medical, family, surgical and social history detailed in HPI.    Exam Findings:    Const: no acute distress, conversant and alert  Eyes: anicteric, extraocular muscles intact  ENMT: normocephalic, Nl oral membranes  Cardio: no cyanosis, nl cap refill  Pulm: no tachypnea; no resp distress  Abd: soft, no tenderness  Musc: no laceration, no tenderness  Neuro: alert;  oriented x 3  Skin: warm, dry; no petichiae  Psych: no anxiety; normal speech     Assessment/Plan:    Overactive Bladder with Urgency Incontinence (new, addt'l workup): His history is suggestive of Overactive Bladder (OAB) with predominantly Urgency Urinary Incontinence (UUI).     His reported symptoms today are relatively moderate and therefore, I believe it would be appropriate to continue pharmacologic therapy in addition to behavioral therapies. Will screen for Nocturnal polyuria with volume based diary and for stricture with cystoscopy. Will  plan on switching from Myrbetriq to Gemtesa in the meantime.

## 2024-02-17 RX ORDER — VIBEGRON 75 MG/1
75 TABLET, FILM COATED ORAL DAILY
Qty: 30 TABLET | Refills: 11 | Status: SHIPPED | OUTPATIENT
Start: 2024-02-17

## 2024-02-19 ENCOUNTER — TELEPHONE (OUTPATIENT)
Dept: UROLOGY | Facility: CLINIC | Age: 89
End: 2024-02-19
Payer: MEDICARE

## 2024-02-19 NOTE — TELEPHONE ENCOUNTER
Informed pt's daughter his Gemtesa Rx was sent into [facility] so the PA team can try to get the medication approved. Informed pt there are no alternatives to this medication and if medication is approved by [facility], she can transfer it to [facility]. Pt's daughter voiced understanding.    ----- Message from Adalberto Cosme MD sent at 2/17/2024 11:09 AM CST -----  There are no alternatives.   New prescription for Gemtesa has been sent to [facility].   CG  ----- Message -----  From: Aurea Julien MA  Sent: 2/16/2024   3:48 PM CST  To: Adalberto Cosme MD    Please advise if there is an alternative medication for him to try    ----- Message -----  From: Sondra Norwood  Sent: 2/16/2024   2:44 PM CST  To: Ba Glover Staff     Scarlett pruitt/ [facility] calling regarding the PT medication GEMTESA 75 mg Tab is not in stock and the insurance will deny due to it being very expensive. I spoke to the MA and was told that the doctor was on vacation and there was not one in the office that can assist the PT, and that she will reach out to a on-call doctor to see if they can help. please call back as soon as possible to inform if the on-call doctor will be able to assist the PT,  [phone]

## 2024-02-21 ENCOUNTER — PROCEDURE VISIT (OUTPATIENT)
Dept: UROLOGY | Facility: CLINIC | Age: 89
End: 2024-02-21
Payer: MEDICARE

## 2024-02-21 VITALS
WEIGHT: 209.88 LBS | RESPIRATION RATE: 18 BRPM | DIASTOLIC BLOOD PRESSURE: 79 MMHG | OXYGEN SATURATION: 96 % | SYSTOLIC BLOOD PRESSURE: 133 MMHG | BODY MASS INDEX: 31.09 KG/M2 | HEART RATE: 98 BPM | HEIGHT: 69 IN

## 2024-02-21 DIAGNOSIS — N32.81 OAB (OVERACTIVE BLADDER): ICD-10-CM

## 2024-02-21 RX ORDER — LIDOCAINE HYDROCHLORIDE 20 MG/ML
JELLY TOPICAL
Status: COMPLETED | OUTPATIENT
Start: 2024-02-21 | End: 2024-02-21

## 2024-02-21 RX ADMIN — LIDOCAINE HYDROCHLORIDE 5 ML: 20 JELLY TOPICAL at 02:02

## 2024-02-21 NOTE — PATIENT INSTRUCTIONS
What to Expect After a Cystoscopy  For the next 24-48 hours, you may feel a mild burning when you urinate. This burning is normal and expected. Drink plenty of water to dilute the urine to help relieve the burning sensation. You may also see a small amount of blood in your urine after the procedure.    Unless you are already taking antibiotics, you may be given an antibiotic after the test to prevent infection.    Signs and Symptoms to Report  Call the [facility] at [phone] if you develop any of the following:  Fever of 101 degrees or higher  Chills or persistent bleeding  Inability to urinate

## 2024-04-05 DIAGNOSIS — F03.918 DEMENTIA WITH BEHAVIORAL DISTURBANCE: Primary | ICD-10-CM

## 2024-04-15 PROCEDURE — G0180 MD CERTIFICATION HHA PATIENT: HCPCS | Mod: ,,, | Performed by: PSYCHIATRY & NEUROLOGY

## 2024-05-14 DIAGNOSIS — F33.9 DEPRESSION, RECURRENT: ICD-10-CM

## 2024-05-14 DIAGNOSIS — I47.10 SVT (SUPRAVENTRICULAR TACHYCARDIA): ICD-10-CM

## 2024-05-14 RX ORDER — CHOLECALCIFEROL (VITAMIN D3) 25 MCG
1000 TABLET ORAL DAILY
Qty: 90 TABLET | Refills: 3 | Status: SHIPPED | OUTPATIENT
Start: 2024-05-14

## 2024-05-14 RX ORDER — METOPROLOL SUCCINATE 25 MG/1
25 TABLET, EXTENDED RELEASE ORAL DAILY
Qty: 90 TABLET | Refills: 3 | Status: SHIPPED | OUTPATIENT
Start: 2024-05-14 | End: 2025-05-14

## 2024-05-22 ENCOUNTER — PATIENT MESSAGE (OUTPATIENT)
Dept: NEUROLOGY | Facility: CLINIC | Age: 89
End: 2024-05-22
Payer: MEDICARE

## 2024-05-24 ENCOUNTER — LAB VISIT (OUTPATIENT)
Dept: LAB | Facility: OTHER | Age: 89
End: 2024-05-24
Attending: UROLOGY
Payer: MEDICARE

## 2024-05-24 DIAGNOSIS — C61 PROSTATE CANCER: ICD-10-CM

## 2024-05-24 LAB — COMPLEXED PSA SERPL-MCNC: 6.2 NG/ML (ref 0–4)

## 2024-05-24 PROCEDURE — 84153 ASSAY OF PSA TOTAL: CPT | Mod: HCNC | Performed by: UROLOGY

## 2024-05-24 PROCEDURE — 36415 COLL VENOUS BLD VENIPUNCTURE: CPT | Mod: HCNC | Performed by: UROLOGY

## 2024-05-27 NOTE — PROGRESS NOTES
Clinic Note  5/27/2024      Subjective:         Chief Complaint:   HPI  Hu Lopez is a 88 y.o. male with a history of prostate cancer.  radical retropubic prostatectomy in 2008 by Dr. Mitchell. Rising PSA. Bone scan and CT scan in February 2016 did not show metastatic disease.  Patient complains of nocturia 4-5x/noc for last month. Started on Ditropan XL a year ago per symptoms improved.  Recent PSA 0.77 9/24/2018. Recent   Bilateral inguinal hernia surgery.   post void residual 46 ccs.     9/28/2023- Last visit here in 2019. PSA 4.5. Today he complains of weak stream, nocturia x3-4 and weak stream. Does not have daytime frequency.  He reports similar symptoms several years ago which was relieved by ditropan, which was subsequently discontinued by his neurologist. He was started on Myrbetriq which he still takes.      10/17/2023- PSMA scan- no avid nodes or metastasis to my review.Not read by radiology yet.      5/28/2024- PSA 6.4  He is feeling fine. LUTS -nocturia,decreased force of stream. Has appointment with Dr Cosme tomorrow.    Lab Results   Component Value Date    PSA 0.34 06/20/2017    PSA 0.02 01/19/2012    PSADIAG 6.2 (H) 05/24/2024    PSADIAG 4.5 (H) 09/22/2023    PSADIAG 1.1 01/27/2020    PSADIAG 0.55 01/22/2019    PSADIAG 0.63 07/19/2018    PSADIAG 0.59 07/13/2018    PSADIAG 0.40 01/08/2018    PSADIAG 0.26 09/21/2016    PSADIAG 0.22 01/26/2016    PSATOTAL 2.0 08/25/2022    PSAFREE 0.87 08/25/2022    PSAFREEPCT 43.50 08/25/2022      Past Medical History:   Diagnosis Date    Allergy     Basal cell carcinoma     BPH (benign prostatic hyperplasia)     CAD (coronary artery disease)     Cataracts, bilateral     Diverticulitis     DVT (deep venous thrombosis)     Elevated PSA     GERD (gastroesophageal reflux disease)     Glaucoma     Hyperlipidemia     Hypertension     Prostate cancer     Skin cancer     Sleep apnea     CPAP     Family History   Problem Relation Name Age of Onset    Prostate cancer  Father      Colon cancer Paternal Uncle      Stroke Paternal Uncle      Depression Paternal Uncle      No Known Problems Mother      Heart attack Brother 2 70     Social History     Socioeconomic History    Marital status:    Tobacco Use    Smoking status: Never    Smokeless tobacco: Never   Substance and Sexual Activity    Alcohol use: Yes     Alcohol/week: 0.0 standard drinks of alcohol     Comment: rare    Drug use: No    Sexual activity: Never   Social History Narrative    3 children - healthy.     Past Surgical History:   Procedure Laterality Date    APPENDECTOMY      COLON SURGERY      75,05    COLONOSCOPY      11    COLOSTOMY      PROSTATE SURGERY  2008    REVISION COLOSTOMY      SKIN CANCER EXCISION      chin    UPPER GASTROINTESTINAL ENDOSCOPY      11     Patient Active Problem List   Diagnosis    History of diverticulitis    GERD (gastroesophageal reflux disease)    CALVIN (obstructive sleep apnea)    Encysted hydrocele    History of prostate cancer    Nocturia    Essential hypertension    HLD (hyperlipidemia)    PARVEEN (generalized anxiety disorder)    Urgency of urination    Prostate cancer    Atherosclerosis of arteries of extremities    Environmental allergies    Aortic atherosclerosis    Ectatic aorta    OAB (overactive bladder)    Hx of radical prostatectomy    Weak urine stream    Onychomycosis    Iron deficiency anemia    Knee injury, left, initial encounter    Insomnia    Alzheimer's disease without behavioral disturbance    Stage 3b chronic kidney disease    Impaired functional mobility, balance, gait, and endurance    Alteration in self-care ability    Sequelae of protein-calorie malnutrition    Depression, recurrent    SVT (supraventricular tachycardia)    Mixed conductive and sensorineural hearing loss of both ears    Senile purpura     Review of Systems   Constitutional:  Negative for appetite change, chills, fatigue, fever and unexpected weight change.   HENT:  Negative for nosebleeds.   "  Respiratory:  Negative for shortness of breath and wheezing.    Cardiovascular:  Negative for chest pain, palpitations and leg swelling.   Gastrointestinal:  Negative for abdominal distention, abdominal pain, constipation, diarrhea, nausea and vomiting.   Genitourinary:  Positive for nocturia. Negative for dysuria and hematuria.   Musculoskeletal:  Negative for arthralgias and back pain.   Skin:  Negative for pallor.   Neurological:  Negative for dizziness, seizures and syncope.   Hematological:  Negative for adenopathy.   Psychiatric/Behavioral:  Negative for dysphoric mood.          Objective:      There were no vitals taken for this visit.  Estimated body mass index is 30.99 kg/m² as calculated from the following:    Height as of 2/21/24: 5' 9" (1.753 m).    Weight as of 2/21/24: 95.2 kg (209 lb 14.1 oz).  Physical Exam      Assessment and Plan:           Problem List Items Addressed This Visit       Prostate cancer - Primary       Follow up:   6 months with PSA. Consider repeat PSMA scan if he develops symptoms. Discussed EBRT, ADT.    [place]as          "

## 2024-05-28 ENCOUNTER — OFFICE VISIT (OUTPATIENT)
Dept: UROLOGY | Facility: CLINIC | Age: 89
End: 2024-05-28
Payer: MEDICARE

## 2024-05-28 ENCOUNTER — OFFICE VISIT (OUTPATIENT)
Dept: OPTOMETRY | Facility: CLINIC | Age: 89
End: 2024-05-28
Payer: MEDICARE

## 2024-05-28 VITALS
HEIGHT: 69 IN | BODY MASS INDEX: 31.64 KG/M2 | HEART RATE: 90 BPM | WEIGHT: 213.63 LBS | DIASTOLIC BLOOD PRESSURE: 81 MMHG | SYSTOLIC BLOOD PRESSURE: 127 MMHG

## 2024-05-28 DIAGNOSIS — C61 PROSTATE CANCER: Primary | ICD-10-CM

## 2024-05-28 DIAGNOSIS — H54.3 LOW VISION, BOTH EYES: Primary | ICD-10-CM

## 2024-05-28 PROCEDURE — 99202 OFFICE O/P NEW SF 15 MIN: CPT | Mod: HCNC,S$GLB,, | Performed by: OPTOMETRIST

## 2024-05-28 PROCEDURE — 99999 PR PBB SHADOW E&M-EST. PATIENT-LVL III: CPT | Mod: PBBFAC,HCNC,, | Performed by: UROLOGY

## 2024-05-28 PROCEDURE — 1159F MED LIST DOCD IN RCRD: CPT | Mod: HCNC,CPTII,S$GLB, | Performed by: OPTOMETRIST

## 2024-05-28 PROCEDURE — 1160F RVW MEDS BY RX/DR IN RCRD: CPT | Mod: HCNC,CPTII,S$GLB, | Performed by: OPTOMETRIST

## 2024-05-28 PROCEDURE — 1159F MED LIST DOCD IN RCRD: CPT | Mod: HCNC,CPTII,S$GLB, | Performed by: UROLOGY

## 2024-05-28 PROCEDURE — 92015 DETERMINE REFRACTIVE STATE: CPT | Mod: HCNC,S$GLB,, | Performed by: OPTOMETRIST

## 2024-05-28 PROCEDURE — 1101F PT FALLS ASSESS-DOCD LE1/YR: CPT | Mod: HCNC,CPTII,S$GLB, | Performed by: UROLOGY

## 2024-05-28 PROCEDURE — 99214 OFFICE O/P EST MOD 30 MIN: CPT | Mod: HCNC,S$GLB,, | Performed by: UROLOGY

## 2024-05-28 PROCEDURE — 3288F FALL RISK ASSESSMENT DOCD: CPT | Mod: HCNC,CPTII,S$GLB, | Performed by: OPTOMETRIST

## 2024-05-28 PROCEDURE — 1101F PT FALLS ASSESS-DOCD LE1/YR: CPT | Mod: HCNC,CPTII,S$GLB, | Performed by: OPTOMETRIST

## 2024-05-28 PROCEDURE — 3288F FALL RISK ASSESSMENT DOCD: CPT | Mod: HCNC,CPTII,S$GLB, | Performed by: UROLOGY

## 2024-05-28 PROCEDURE — 1125F AMNT PAIN NOTED PAIN PRSNT: CPT | Mod: HCNC,CPTII,S$GLB, | Performed by: UROLOGY

## 2024-05-28 PROCEDURE — 99999 PR PBB SHADOW E&M-EST. PATIENT-LVL III: CPT | Mod: PBBFAC,HCNC,, | Performed by: OPTOMETRIST
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
## 2024-05-29 ENCOUNTER — OFFICE VISIT (OUTPATIENT)
Dept: UROLOGY | Facility: CLINIC | Age: 89
End: 2024-05-29
Payer: MEDICARE

## 2024-05-29 VITALS
SYSTOLIC BLOOD PRESSURE: 178 MMHG | BODY MASS INDEX: 31.6 KG/M2 | DIASTOLIC BLOOD PRESSURE: 90 MMHG | HEIGHT: 69 IN | HEART RATE: 106 BPM | WEIGHT: 213.38 LBS

## 2024-05-29 DIAGNOSIS — R39.12 WEAK URINARY STREAM: Primary | ICD-10-CM

## 2024-05-29 PROCEDURE — 99214 OFFICE O/P EST MOD 30 MIN: CPT | Mod: S$GLB,,, | Performed by: UROLOGY

## 2024-05-29 PROCEDURE — 99999 PR PBB SHADOW E&M-EST. PATIENT-LVL IV: CPT | Mod: PBBFAC,,, | Performed by: UROLOGY

## 2024-05-29 PROCEDURE — 1159F MED LIST DOCD IN RCRD: CPT | Mod: CPTII,S$GLB,, | Performed by: UROLOGY

## 2024-05-29 NOTE — PROGRESS NOTES
[facility]      New Overactive Bladder (OAB) Note    5/29/2024    Referred by:  No ref. provider found    HPI: Hu Lopez is a very pleasant 88 y.o. male who returns to our department referred for evaluation of urinary incontinence of several years duration. He reports bother is associated with urinary frequency (24hrs) (7-8x daily), with nocturia (4x per night) and with urgency that does not result in urinary incontinence. He reports no stress urinary incontinence associated with exertion. He does not require daily pad use.  He has not made changes to fluid intake.  He reports urinary frequency is only at night. Patient reports urgency is independent of position.     He complains today of a very weak stream. PVR today is 42 mL.     Previous incontinence therapies:  Behavioral Therapies  None  Medications  oral oxybutynin ER 10 mg 6 months (provided no benefit)  mirabegron (Myrbetriq) 50 mg 4 months (provided no benefit)  Gemtesa 75 mg 3 months - provided no benefit  Other Therapies  No Other Therapies    A review of 10+ systems was conducted with pertinent positive and negative findings documented in HPI with all other systems reviewed and negative.    Past medical, family, surgical and social history reviewed as documented in chart with pertinent positive medical, family, surgical and social history detailed in HPI.    Exam Findings:    Const: no acute distress, conversant and alert  Eyes: anicteric, extraocular muscles intact  ENMT: normocephalic, Nl oral membranes  Cardio: no cyanosis, nl cap refill  Pulm: no tachypnea; no resp distress  Abd: soft, no tenderness  Musc: no laceration, no tenderness  Neuro: alert; oriented x 3  Skin: warm, dry; no petichiae  Psych: no anxiety; normal speech     Assessment/Plan:    Overactive Bladder with Urgency Incontinence (established, not meeting goals): His history is suggestive of Overactive Bladder (OAB) with predominantly Urgency Urinary Incontinence  (UUI). This has been refractory to previous medications. I'm not confident he is a good candidate for SNM or Botox at his age. He would likely be interested in Vivally when covered by Medicare.     In the meantime, he has more of a complaint of weak stream. Last cystoscopy was about 7 years ago. With history of prostatectomy, could be result of VUAS. Will plan on office cystoscopy next visit.

## 2024-05-30 ENCOUNTER — TELEPHONE (OUTPATIENT)
Dept: UROLOGY | Facility: CLINIC | Age: 89
End: 2024-05-30
Payer: MEDICARE

## 2024-05-30 NOTE — TELEPHONE ENCOUNTER
Rescheduled pt's cystoscopy to 7/3 at 11 am.    ----- Message from Layla Castillo sent at 5/30/2024  1:52 PM CDT -----  Contact: [phone]  RESCHEDULE  Pt daughter in law is calling to reschedule his procedure 06/12 she is asking for something in early July. Pls call pt at

## 2024-05-31 ENCOUNTER — EXTERNAL HOME HEALTH (OUTPATIENT)
Dept: HOME HEALTH SERVICES | Facility: HOSPITAL | Age: 89
End: 2024-05-31
Payer: MEDICARE

## 2024-07-03 ENCOUNTER — TELEPHONE (OUTPATIENT)
Dept: UROLOGY | Facility: CLINIC | Age: 89
End: 2024-07-03
Payer: MEDICARE

## 2024-07-03 NOTE — TELEPHONE ENCOUNTER
Attempted to reach pt regarding pt's procedural appointment for today (7/3/24) at 1100. Pt did not answer and voicemail box is full.

## 2024-08-13 ENCOUNTER — TELEPHONE (OUTPATIENT)
Dept: UROLOGY | Facility: CLINIC | Age: 89
End: 2024-08-13
Payer: MEDICARE

## 2024-08-13 NOTE — TELEPHONE ENCOUNTER
8/13/24 @ 1:08 pm called patients daughter back regarding message in portal I was able to successfully schedule patient in for cystoscopy procedure

## 2024-08-21 ENCOUNTER — PROCEDURE VISIT (OUTPATIENT)
Facility: CLINIC | Age: 89
End: 2024-08-21
Payer: MEDICARE

## 2024-08-21 ENCOUNTER — TELEPHONE (OUTPATIENT)
Dept: OTOLARYNGOLOGY | Facility: CLINIC | Age: 89
End: 2024-08-21
Payer: MEDICARE

## 2024-08-21 VITALS
DIASTOLIC BLOOD PRESSURE: 85 MMHG | HEIGHT: 69 IN | WEIGHT: 208.56 LBS | TEMPERATURE: 98 F | SYSTOLIC BLOOD PRESSURE: 139 MMHG | HEART RATE: 112 BPM | BODY MASS INDEX: 30.89 KG/M2 | RESPIRATION RATE: 18 BRPM

## 2024-08-21 DIAGNOSIS — R39.12 WEAK URINARY STREAM: ICD-10-CM

## 2024-08-21 RX ORDER — LIDOCAINE HYDROCHLORIDE 20 MG/ML
JELLY TOPICAL
Status: COMPLETED | OUTPATIENT
Start: 2024-08-21 | End: 2024-08-21

## 2024-08-21 RX ADMIN — LIDOCAINE HYDROCHLORIDE: 20 JELLY TOPICAL at 10:08

## 2024-08-21 NOTE — PROCEDURES
Office Cystourethroscopy Note    Date: 8/21/2024   Referring Provider: Adalberto Cosme MD     Reason for Cystoscopy:  slow stream    Upper Tract Evaluation:   No Upper Urinary Tract Study Needed    Procedure Details: Informed consent was obtained and he was sterily prepped and 1% lidocaine jelly was injected per urethra. A flexible cystoscope was inserted into the bladder via the urethra. There was no evidence of stricture, stenosis, lesions, other obstruction, or diverticulum. Significant findings included a normal unobstructed urethra only. Cystoscopic examination of the bladder revealed orthotopically positioned, normal bilateral ureteral orifices with clear yellow urine effluxing from each orifice. All mucosal surfaces were examined with no apparent stones, tumors, foreign bodies, erythema, trabeculation, diverticula, or ulcers. The procedure was concluded without complications. The patient was not administered a post-procedure antibiotic.     Specimens: No Specimens    Findings:  No stricture or stenosis    Other Findings:  PVR - 50 mL    Pelvic Exam:  No Pelvic Exam Repeated Today     Assesment and Plan:   No evidence of VUAS or other anatomic obstruction. Patient generally has low PVR and wonder if his weak stream is more related to the frequency of urination.  He would not be a good candidate for current advanced therapies. Hoping that he can utilize Vivally when covered by Medicare.

## 2024-08-21 NOTE — TELEPHONE ENCOUNTER
----- Message from Cindy Hurst sent at 8/21/2024 11:00 AM CDT -----  Regarding: Medical Assistance  Contact: Patient Daughter Kindra  .Type:  Patient Requesting Referral    Who Called: Kindra   Does the patient already have the specialty appointment scheduled?: no   If yes, what is the date of that appointment?: n/a   Referral to What Specialty: Hearing Aid/ Audiology   Reason for Referral: hearing loss   Does the patient want the referral with a specific physician?: no   [facility]   Patient Requesting a Response?: yes   Would the patient rather a call back or a response via [facility]? Call back   Best Call Back Number: [phone]   Additional Information: Patient currently has hearing aids through [facility] [place] Patient would like to transfer care to [facility] Patient has lost one of hearing aids and Daughter would like him to be seen as soon as possible Please Assist

## 2024-08-23 DIAGNOSIS — G30.9 ALZHEIMER'S DISEASE WITHOUT BEHAVIORAL DISTURBANCE: ICD-10-CM

## 2024-08-23 DIAGNOSIS — I10 ESSENTIAL HYPERTENSION: Primary | ICD-10-CM

## 2024-08-23 DIAGNOSIS — F02.80 ALZHEIMER'S DISEASE WITHOUT BEHAVIORAL DISTURBANCE: ICD-10-CM

## 2024-08-23 DIAGNOSIS — D50.8 OTHER IRON DEFICIENCY ANEMIA: ICD-10-CM

## 2024-08-23 DIAGNOSIS — I10 ESSENTIAL HYPERTENSION: ICD-10-CM

## 2024-08-23 DIAGNOSIS — K30 NUD (NONULCER DYSPEPSIA): ICD-10-CM

## 2024-08-23 RX ORDER — MULTIVIT-MIN/FA/LYCOPEN/LUTEIN .4-300-25
1 TABLET ORAL DAILY
Qty: 90 TABLET | Refills: 3 | Status: SHIPPED | OUTPATIENT
Start: 2024-08-23

## 2024-08-23 RX ORDER — OMEPRAZOLE 40 MG/1
40 CAPSULE, DELAYED RELEASE ORAL EVERY MORNING
Qty: 30 CAPSULE | Refills: 11 | Status: SHIPPED | OUTPATIENT
Start: 2024-08-23

## 2024-08-23 RX ORDER — GALANTAMINE 4 MG/1
4 TABLET, FILM COATED ORAL 2 TIMES DAILY WITH MEALS
Qty: 60 TABLET | Refills: 11 | Status: SHIPPED | OUTPATIENT
Start: 2024-08-23 | End: 2025-08-23

## 2024-08-23 RX ORDER — LOSARTAN POTASSIUM 50 MG/1
50 TABLET ORAL DAILY
Qty: 90 TABLET | Refills: 3 | Status: SHIPPED | OUTPATIENT
Start: 2024-08-23 | End: 2025-08-23

## 2024-09-09 ENCOUNTER — TELEPHONE (OUTPATIENT)
Dept: PRIMARY CARE CLINIC | Facility: CLINIC | Age: 89
End: 2024-09-09
Payer: MEDICARE

## 2024-09-09 ENCOUNTER — LAB VISIT (OUTPATIENT)
Dept: LAB | Facility: HOSPITAL | Age: 89
End: 2024-09-09
Attending: HOSPITALIST
Payer: MEDICARE

## 2024-09-09 ENCOUNTER — OFFICE VISIT (OUTPATIENT)
Dept: PRIMARY CARE CLINIC | Facility: CLINIC | Age: 89
End: 2024-09-09
Payer: MEDICARE

## 2024-09-09 VITALS
WEIGHT: 208.69 LBS | OXYGEN SATURATION: 98 % | HEART RATE: 78 BPM | HEIGHT: 69 IN | BODY MASS INDEX: 30.91 KG/M2 | SYSTOLIC BLOOD PRESSURE: 151 MMHG | TEMPERATURE: 98 F | DIASTOLIC BLOOD PRESSURE: 79 MMHG

## 2024-09-09 DIAGNOSIS — I47.10 SVT (SUPRAVENTRICULAR TACHYCARDIA): ICD-10-CM

## 2024-09-09 DIAGNOSIS — I70.0 AORTIC ATHEROSCLEROSIS: ICD-10-CM

## 2024-09-09 DIAGNOSIS — N32.81 OAB (OVERACTIVE BLADDER): ICD-10-CM

## 2024-09-09 DIAGNOSIS — C61 PROSTATE CANCER: ICD-10-CM

## 2024-09-09 DIAGNOSIS — D69.2 SENILE PURPURA: ICD-10-CM

## 2024-09-09 DIAGNOSIS — R29.6 FALLS FREQUENTLY: ICD-10-CM

## 2024-09-09 DIAGNOSIS — E78.2 MIXED HYPERLIPIDEMIA: Primary | ICD-10-CM

## 2024-09-09 DIAGNOSIS — F02.A18 OTHER MILD FRONTOTEMPORAL DEMENTIA WITH OTHER BEHAVIORAL DISTURBANCE: ICD-10-CM

## 2024-09-09 DIAGNOSIS — F02.80 ALZHEIMER'S DISEASE WITHOUT BEHAVIORAL DISTURBANCE: ICD-10-CM

## 2024-09-09 DIAGNOSIS — G47.33 OSA (OBSTRUCTIVE SLEEP APNEA): ICD-10-CM

## 2024-09-09 DIAGNOSIS — H90.6 MIXED CONDUCTIVE AND SENSORINEURAL HEARING LOSS OF BOTH EARS: ICD-10-CM

## 2024-09-09 DIAGNOSIS — G30.9 ALZHEIMER'S DISEASE WITHOUT BEHAVIORAL DISTURBANCE: ICD-10-CM

## 2024-09-09 DIAGNOSIS — G20.C PARKINSONISM, UNSPECIFIED PARKINSONISM TYPE: ICD-10-CM

## 2024-09-09 DIAGNOSIS — I10 ESSENTIAL HYPERTENSION: ICD-10-CM

## 2024-09-09 DIAGNOSIS — B35.1 ONYCHOMYCOSIS: ICD-10-CM

## 2024-09-09 DIAGNOSIS — N18.32 STAGE 3B CHRONIC KIDNEY DISEASE: ICD-10-CM

## 2024-09-09 DIAGNOSIS — E78.2 MIXED HYPERLIPIDEMIA: ICD-10-CM

## 2024-09-09 DIAGNOSIS — Z85.46 HISTORY OF PROSTATE CANCER: ICD-10-CM

## 2024-09-09 DIAGNOSIS — G31.09 OTHER MILD FRONTOTEMPORAL DEMENTIA WITH OTHER BEHAVIORAL DISTURBANCE: ICD-10-CM

## 2024-09-09 DIAGNOSIS — F33.9 DEPRESSION, RECURRENT: ICD-10-CM

## 2024-09-09 PROBLEM — E64.0 SEQUELAE OF PROTEIN-CALORIE MALNUTRITION: Status: RESOLVED | Noted: 2023-05-16 | Resolved: 2024-09-09

## 2024-09-09 PROBLEM — S89.92XA KNEE INJURY, LEFT, INITIAL ENCOUNTER: Status: RESOLVED | Noted: 2020-02-05 | Resolved: 2024-09-09

## 2024-09-09 LAB
ANION GAP SERPL CALC-SCNC: 8 MMOL/L (ref 8–16)
BASOPHILS # BLD AUTO: 0.04 K/UL (ref 0–0.2)
BASOPHILS NFR BLD: 0.6 % (ref 0–1.9)
BUN SERPL-MCNC: 33 MG/DL (ref 8–23)
CALCIUM SERPL-MCNC: 9.1 MG/DL (ref 8.7–10.5)
CHLORIDE SERPL-SCNC: 108 MMOL/L (ref 95–110)
CHOLEST SERPL-MCNC: 140 MG/DL (ref 120–199)
CHOLEST/HDLC SERPL: 4 {RATIO} (ref 2–5)
CO2 SERPL-SCNC: 22 MMOL/L (ref 23–29)
COMPLEXED PSA SERPL-MCNC: 6.8 NG/ML (ref 0–4)
CREAT SERPL-MCNC: 1.6 MG/DL (ref 0.5–1.4)
DIFFERENTIAL METHOD BLD: ABNORMAL
EOSINOPHIL # BLD AUTO: 0.3 K/UL (ref 0–0.5)
EOSINOPHIL NFR BLD: 5 % (ref 0–8)
ERYTHROCYTE [DISTWIDTH] IN BLOOD BY AUTOMATED COUNT: 13.2 % (ref 11.5–14.5)
EST. GFR  (NO RACE VARIABLE): 41.2 ML/MIN/1.73 M^2
GLUCOSE SERPL-MCNC: 143 MG/DL (ref 70–110)
HCT VFR BLD AUTO: 40.5 % (ref 40–54)
HDLC SERPL-MCNC: 35 MG/DL (ref 40–75)
HDLC SERPL: 25 % (ref 20–50)
HGB BLD-MCNC: 13.4 G/DL (ref 14–18)
IMM GRANULOCYTES # BLD AUTO: 0.02 K/UL (ref 0–0.04)
IMM GRANULOCYTES NFR BLD AUTO: 0.3 % (ref 0–0.5)
LDLC SERPL CALC-MCNC: 74 MG/DL (ref 63–159)
LYMPHOCYTES # BLD AUTO: 1.5 K/UL (ref 1–4.8)
LYMPHOCYTES NFR BLD: 22.4 % (ref 18–48)
MCH RBC QN AUTO: 32.9 PG (ref 27–31)
MCHC RBC AUTO-ENTMCNC: 33.1 G/DL (ref 32–36)
MCV RBC AUTO: 100 FL (ref 82–98)
MONOCYTES # BLD AUTO: 0.5 K/UL (ref 0.3–1)
MONOCYTES NFR BLD: 7.3 % (ref 4–15)
NEUTROPHILS # BLD AUTO: 4.4 K/UL (ref 1.8–7.7)
NEUTROPHILS NFR BLD: 64.4 % (ref 38–73)
NONHDLC SERPL-MCNC: 105 MG/DL
NRBC BLD-RTO: 0 /100 WBC
PLATELET # BLD AUTO: 204 K/UL (ref 150–450)
PMV BLD AUTO: 9.6 FL (ref 9.2–12.9)
POTASSIUM SERPL-SCNC: 4.3 MMOL/L (ref 3.5–5.1)
RBC # BLD AUTO: 4.07 M/UL (ref 4.6–6.2)
SODIUM SERPL-SCNC: 138 MMOL/L (ref 136–145)
TRIGL SERPL-MCNC: 155 MG/DL (ref 30–150)
WBC # BLD AUTO: 6.75 K/UL (ref 3.9–12.7)

## 2024-09-09 PROCEDURE — 1159F MED LIST DOCD IN RCRD: CPT | Mod: HCNC,CPTII,S$GLB, | Performed by: HOSPITALIST

## 2024-09-09 PROCEDURE — 36415 COLL VENOUS BLD VENIPUNCTURE: CPT | Mod: HCNC | Performed by: HOSPITALIST

## 2024-09-09 PROCEDURE — 84153 ASSAY OF PSA TOTAL: CPT | Mod: HCNC | Performed by: HOSPITALIST

## 2024-09-09 PROCEDURE — 80048 BASIC METABOLIC PNL TOTAL CA: CPT | Mod: HCNC | Performed by: NURSE PRACTITIONER

## 2024-09-09 PROCEDURE — 85025 COMPLETE CBC W/AUTO DIFF WBC: CPT | Mod: HCNC | Performed by: NURSE PRACTITIONER

## 2024-09-09 PROCEDURE — 80061 LIPID PANEL: CPT | Mod: HCNC | Performed by: HOSPITALIST

## 2024-09-09 PROCEDURE — 1126F AMNT PAIN NOTED NONE PRSNT: CPT | Mod: HCNC,CPTII,S$GLB, | Performed by: HOSPITALIST

## 2024-09-09 PROCEDURE — 1100F PTFALLS ASSESS-DOCD GE2>/YR: CPT | Mod: HCNC,CPTII,S$GLB, | Performed by: HOSPITALIST

## 2024-09-09 PROCEDURE — 3288F FALL RISK ASSESSMENT DOCD: CPT | Mod: HCNC,CPTII,S$GLB, | Performed by: HOSPITALIST

## 2024-09-09 PROCEDURE — 99215 OFFICE O/P EST HI 40 MIN: CPT | Mod: HCNC,S$GLB,, | Performed by: HOSPITALIST

## 2024-09-09 NOTE — TELEPHONE ENCOUNTER
Good afternoon, could you please reach out to daughter Ekaterina # [phone] to schedule follow up appt with Dr Miller, thank you.     Lenka

## 2024-09-09 NOTE — PATIENT INSTRUCTIONS
You can get the second shingles shot anytime.  Get the new COVID booster once it is available.      Dr. Miller would be a good resource on possible clinical trials or other social resources.

## 2024-09-09 NOTE — PROGRESS NOTES
Primary Care Provider Appointment - [facility]  Tony Gonzalez MD      Subjective:      Patient ID: Hu Lopez is a 88 y.o. male with   Past Medical History:   Diagnosis Date    Allergy     Basal cell carcinoma     BPH (benign prostatic hyperplasia)     CAD (coronary artery disease)     Cataracts, bilateral     Diverticulitis     DVT (deep venous thrombosis)     Elevated PSA     GERD (gastroesophageal reflux disease)     Glaucoma     Hyperlipidemia     Hypertension     Prostate cancer     Skin cancer     Sleep apnea     CPAP           Chief Complaint: Follow-up    Prior to this visit, patient's last encounter with PCP was Visit date not found.    Pt seen concurrently with spouse and hired caretaker Edith as well as daughter Rosalinda.  Interview conducted in both English and Irish with Rosalinda translating.  He reports seeing Dr. Canas for prostate issues; has h/o prostatectomy but PSA has been increasing.  On gemtesa for urination.  Recently has been seeing Dr Cosme for the urination; had a cystoscopy last month.  Having some difficulty urinating getting worse over past year.  Pt seen with spouse, daughter, and caregiver together.  Having difficulty with hearing.  Lost prior hearing aids and not adjusting well to newer ones.  Also gets injection in eye Eylea from Dr. Huerta q 6wk.    He and spouse are patients of Dr. Miller in neurology for dementia.    Walks for exercise.  Has had several falls recently tripping on uneven pavement.    C/o daytime sleepiness and waking up during night.  Has dx CALVIN and CPAP but doesn't use it; has difficulty understanding how it works.            Medications: Does have pill packs    Morning pockets:   Cerovite Multi-vitamin   Galantamine 4 mg   Gemtesa 75 mg   Losartan 50 mg   Metoprolol succinate ER 25 mg   Omeprazole 40 mg   Sertraline 100 mg   Vitamin B12 1,000 mcg OTC  Vitamin D3 1,000 IU      Evening pockets:   Atorvastatin 40 mg   Galantamine 4 mg   Melatonin 5 mg  OTC          4Ms for Medical Decision-Making in Older Adults    Last Completed EAWV: None    MOBILITY:  Get Up and Go:       No data to display              Activities of Daily Living:       No data to display              Whisper Test:       No data to display              Disability Status:       No data to display              Nutrition Screening:       No data to display             Screening Score: 0-7 Malnourished, 8-11 At Risk, 12-14 Normal        MENTATION:   Depression Patient Health Questionnaire:      5/16/2023    10:51 AM   Depression Patient Health Questionnaire   Over the last two weeks how often have you been bothered by little interest or pleasure in doing things More than half the days   Over the last two weeks how often have you been bothered by feeling down, depressed or hopeless More than half the days   PHQ-2 Total Score 4   PHQ-9 Total Score 9   PHQ-9 Interpretation Mild     Has Dementia Dx: Yes    Cognitive Function Screening:       No data to display              Cognitive Function Screening Total - Less than 4 = Abnormal,  Greater than or equal to 4 = Normal        MEDICATIONS:  High Risk Medications:  Total Active Medications: 1  sertraline - 100 MG    WHAT MATTERS MOST:  Advance Care Planning   ACP Status:   Patient has had an ACP conversation  Living Will: No  Power of : No  LaPOST: No    What is most important right now is to focus on spending time at home and remaining as independent as possible    Accordingly, we have decided that the best plan to meet the patient's goals includes continuing with treatment      What matters most to patient today is: seeing better                 Social History     Socioeconomic History    Marital status:    Tobacco Use    Smoking status: Never    Smokeless tobacco: Never   Substance and Sexual Activity    Alcohol use: Yes     Alcohol/week: 0.0 standard drinks of alcohol     Comment: rare    Drug use: No    Sexual activity: Never   Social  "History Narrative    3 children - healthy.       Review of Systems   Constitutional:  Positive for fatigue.   HENT:  Positive for hearing loss.    Eyes:  Positive for visual disturbance.   Cardiovascular:  Negative for leg swelling.   Genitourinary:  Positive for difficulty urinating.   Musculoskeletal:  Positive for back pain.   Neurological:  Positive for memory loss.   Psychiatric/Behavioral:  Positive for sleep disturbance.         Objective:   BP (!) 151/79 (BP Location: Left arm, Patient Position: Sitting, BP Method: Medium (Manual))   Pulse 78   Temp 98.1 °F (36.7 °C) (Oral)   Ht 5' 9" (1.753 m)   Wt 94.7 kg (208 lb 10.7 oz)   SpO2 98%   BMI 30.81 kg/m²     Physical Exam  Vitals reviewed.   Constitutional:       General: He is not in acute distress.     Appearance: Normal appearance.   HENT:      Head: Normocephalic and atraumatic.      Right Ear: External ear normal.      Left Ear: External ear normal.      Nose: Nose normal.      Mouth/Throat:      Mouth: Mucous membranes are moist.      Pharynx: Oropharynx is clear.   Eyes:      General: No scleral icterus.     Conjunctiva/sclera: Conjunctivae normal.      Comments: Inferolateral strabismus of L eye   Neck:      Vascular: No carotid bruit.   Cardiovascular:      Rate and Rhythm: Normal rate and regular rhythm.      Pulses: Normal pulses.      Heart sounds: Normal heart sounds.   Pulmonary:      Effort: Pulmonary effort is normal.      Breath sounds: Normal breath sounds.   Abdominal:      General: Bowel sounds are normal.   Musculoskeletal:         General: No tenderness.      Cervical back: Normal range of motion and neck supple.      Right lower leg: No edema.      Left lower leg: No edema.   Lymphadenopathy:      Cervical: No cervical adenopathy.   Skin:     General: Skin is warm and dry.   Neurological:      Mental Status: He is alert.      Comments: Moderately hard of hearing.  Perseverates on visual difficulty and wanting a magnifying glass he " can use for distance vision as well.            Lab Results   Component Value Date    WBC 5.92 05/16/2023    HGB 13.4 (L) 05/16/2023    HCT 40.8 05/16/2023     05/16/2023    CHOL 142 08/25/2022    TRIG 87 08/25/2022    HDL 42 08/25/2022    ALT 21 08/31/2022    AST 21 08/31/2022     05/16/2023    K 4.9 05/16/2023     05/16/2023    CREATININE 1.5 (H) 05/16/2023    BUN 30 (H) 05/16/2023    CO2 20 (L) 05/16/2023    TSH 1.571 05/16/2023    PSA 0.34 06/20/2017    INR 1.0 06/04/2012    HGBA1C 5.5 06/20/2017       Current Outpatient Medications on File Prior to Visit   Medication Sig Dispense Refill    atorvastatin (LIPITOR) 40 MG tablet Take 1 tablet (40 mg total) by mouth once daily. 30 tablet 11    galantamine (RAZADYNE) 4 MG Tab Take 1 tablet (4 mg total) by mouth 2 (two) times daily with meals. 60 tablet 11    GEMTESA 75 mg Tab Take 75 mg by mouth once daily. 30 tablet 11    losartan (COZAAR) 50 MG tablet Take 1 tablet (50 mg total) by mouth once daily. 90 tablet 3    metoprolol succinate (TOPROL-XL) 25 MG 24 hr tablet Take 1 tablet (25 mg total) by mouth once daily. 90 tablet 3    omeprazole (PRILOSEC) 40 MG capsule Take 1 capsule (40 mg total) by mouth every morning. 30 capsule 11    sertraline (ZOLOFT) 100 MG tablet Take 1 tablet (100 mg total) by mouth once daily. 90 tablet 3    vitamin D (VITAMIN D3) 1000 units Tab Take 1 tablet (1,000 Units total) by mouth once daily. 90 tablet 3    [DISCONTINUED] ciclopirox (PENLAC) 8 % Soln Apply topically nightly. (Patient not taking: Reported on 9/9/2024) 6.6 mL 11    [DISCONTINUED] desonide (DESOWEN) 0.05 % cream  (Patient not taking: Reported on 9/9/2024)      [DISCONTINUED] dextran 70-hypromellose (TEARS) ophthalmic solution Place 1 drop into both eyes as needed. (Patient not taking: Reported on 9/9/2024) 30 mL 6    [DISCONTINUED] iron-vitamin C 100-250 mg, ICAR-C, 100-250 mg Tab Take 1 tablet by mouth once daily. for 90 days (Patient not taking:  Reported on 9/9/2024) 90 tablet 3    [DISCONTINUED] multivit-min-FA-lycopen-lutein (CEROVITE SENIOR) 0.4 mg-300 mcg- 250 mcg Tab Take 1 tablet by mouth once daily. (Patient not taking: Reported on 9/9/2024) 90 tablet 3    [DISCONTINUED] multivitamin (THERAGRAN) per tablet Take 1 tablet by mouth once daily. 90 tablet 3    [DISCONTINUED] mupirocin (BACTROBAN) 2 % ointment Apply daily to hand, cover with bandage. (Patient not taking: Reported on 9/9/2024) 22 g 3    [DISCONTINUED] polyethylene glycol (GLYCOLAX) 17 gram PwPk Take 17 g by mouth once daily. (Patient not taking: Reported on 9/9/2024) 30 packet 11    [DISCONTINUED] PROCTOFOAM HC rectal foam USE ONE APPLICATIORFUL RECTALLY TWICE DAILY (Patient not taking: Reported on 9/9/2024) 10 g 3    [DISCONTINUED] triamcinolone acetonide 0.025 % Lotn Apply 1 application topically 2 (two) times daily. (Patient not taking: Reported on 9/9/2024) 60 mL 0     No current facility-administered medications on file prior to visit.         Assessment:   88 y.o. male with multiple co-morbid illnesses here to follow-up for ongoing chronic disease management and preventive health maintenance.    Plan:     Problem List Items Addressed This Visit       CALVIN (obstructive sleep apnea)     Not using CPAP due to difficulty understanding how machine works.  Advised him to discuss with his Sleep Medicine doctor who may know of a less-complex device that would be more senior-friendly.         History of prostate cancer    Relevant Orders    PROSTATE SPECIFIC ANTIGEN, DIAGNOSTIC (Completed)    Essential hypertension     On toprol XL 25mg and losartan 50mg.  Elevated BP today likely confounded by emotional lability; per chart review is normally at goal save for a few outliers.         HLD (hyperlipidemia) - Primary     On atorvastatin 40mg; due to update lipids.         Relevant Orders    LIPID PANEL (Completed)    Prostate cancer     Monitored with q6mo diagnostic PSA by urologist.  Pt and  family would benefit from further counseling on reduced benefit and increased risk of continuing to follow PSA at his age and functional status.         Aortic atherosclerosis     Incidental finding on prior imaging.  On atorvastatin 40mg.  Not on ASA.         OAB (overactive bladder)     On Gemtesa, managed by a urologist.  Galantamine likely contributing to urinary retention.  Likely would not tolerate tamsulosin given h/o orthostasis, and has h/o prostatectomy so unlikely to benefit from finasteride.         Onychomycosis     Last saw Dr. Ross in Podiatry about 1 y/a for nail trimming; needs that again as well as ongoing treatment for fungal disease.  Will help set up f/u appt with him.         Alzheimer's disease without behavioral disturbance    Stage 3b chronic kidney disease     Due to update basic labs today; will monitor trajectory.         Depression, recurrent     On sertraline 100mg; denies sx at this time.         SVT (supraventricular tachycardia)     Pt's daughter believes he has some sort of history of heart problem but does not recall exactly what.  Presence of bundle branch blocks on prior EKG suggests h/o MI.  On metoprolol succinate 25mg; but not clear if this was the indication for this.  He is not on ASA but daughter wonders if he should be and requests Cardiology referral.           Relevant Orders    Ambulatory referral/consult to Cardiology    Mixed conductive and sensorineural hearing loss of both ears     Lost one of his hearing aids and needs help replacing it; referral to Audiology placed.         Relevant Orders    Ambulatory referral/consult to Audiology    Senile purpura     Senescent thinning of skin will increase predisposition to bruising.         Parkinsonism, unspecified Parkinsonism type     Evaluated by Dr. Miller in past; has not been on any antiparkinson therapy and does not appear to need it at this time.  Falls predominantly appear to be related to tripping on uneven  pavement when walking the neighborhood.           Other mild frontotemporal dementia with other behavioral disturbance     Pt overdue for f/u with neurologist Dr. Miller.  On Galantamine.  Has been referred to [facility] previously but not clear if ever actually enrolled.  Daughter is asking about resources for socialization or senior day programs.  Will try to help get f/u appt w/ Dr. Miller set up.           Falls frequently     Parkinsonism, senile debility, and low vision all are contributing factors.  Ambulation outside the home is limited by endurance and slow pace.  A rolling bench walker will enable him to ambulate for longer distances more safely in and out of the home, and allow him to stop to rest when needed on longer excursions.  The mobility limitation cannot be sufficiently resolved by the use of a cane. Patient's functional mobility deficit can be sufficiently resolved with the use of a Rollator. Patient's mobility limitation significantly impairs their ability to participate in one of more activities of daily living. The use of a Rollator will significantly improve the patient's ability to participate in MRADLS and the patient will use it on regular basis in and out of the home.          Relevant Orders    Ambulatory referral/consult to Physical/Occupational Therapy    WALKER FOR HOME USE       Health Maintenance         Date Due Completion Date    RSV Vaccine (Age 60+ and Pregnant patients) (1 - 1-dose 60+ series) Never done ---    Shingles Vaccine (2 of 2) 07/11/2023 5/16/2023    COVID-19 Vaccine (6 - 2023-24 season) 09/01/2024 10/11/2022    Lipid Panel 09/09/2029 9/9/2024    TETANUS VACCINE 09/09/2034 9/9/2024    Override on 6/15/2014: Done            Future Appointments   Date Time Provider Department Center   9/12/2024  2:00 PM Anjali Pedro AU.D [facility] ENT [facility]   10/25/2024  2:20 PM Olga Lidia Kyle MD [facility] CARDIO [address]   11/8/2024  1:45 PM Boris Ross DPM NT[facility]  PODIATR [place]   11/26/2024  8:15 AM [facility]EN, [facility]   12/3/2024 10:40 AM Adalberto Cosme MD [facility] URO [place]   12/5/2024 10:00 AM Royal Canas MD [facility] UROLOG [address]   3/14/2025  1:40 PM Trudi Lai DNP [facility] MED CLN [address]         Follow up in about 6 months (around 3/9/2025). Total clinical care time was 40 min.    Tony Gonzalez MD  [facility]  [facility]

## 2024-09-10 ENCOUNTER — TELEPHONE (OUTPATIENT)
Dept: OTOLARYNGOLOGY | Facility: CLINIC | Age: 89
End: 2024-09-10
Payer: MEDICARE

## 2024-09-10 NOTE — ASSESSMENT & PLAN NOTE
On toprol XL 25mg and losartan 50mg.  Elevated BP today likely confounded by emotional lability; per chart review is normally at goal save for a few outliers.

## 2024-09-10 NOTE — ASSESSMENT & PLAN NOTE
Pt overdue for f/u with neurologist Dr. Miller.  On Galantamine.  Has been referred to [facility] previously but not clear if ever actually enrolled.  Daughter is asking about resources for socialization or senior day programs.  Will try to help get f/u appt w/ Dr. Miller set up.

## 2024-09-10 NOTE — TELEPHONE ENCOUNTER
Spoke with the patient's daughter earlier this morning who indicated that she would like to set up an audiogram for her father and to have him fit with a Sarai hearing aid.  I explained that [facility] does not fit Sarai and she stated she was told that we do.  I told her that I would check with my supervisor and call her back.  I confirmed with [facility] that we do not fit Sarai and tried calling the daughter back twice, but had to leave a message both times.  In the message, I left a call back number ([phone]), information on a clinic that I think may fit Sarai ([facility]), and offered to schedule an appointment for her father for the audiogram in the meanwhile.    ----- Message from Kathy Sharp MA sent at 9/9/2024  2:32 PM CDT -----  Regarding: FW: Appt  Contact: [phone]    ----- Message -----  From: Kemal Hyde MA  Sent: 9/9/2024   2:25 PM CDT  To: Kathy Sharp MA  Subject: FW: Appt                                           ----- Message -----  From: Esther Sheridan  Sent: 9/9/2024   2:19 PM CDT  To: [facility] Clinical Support  Subject: Appt                                             Type:  Needs Medical Advice    Who Called:  Kindra  Would the patient rather a call back or a response via [facility]? Call  Best Call Back Number:  [phone]  Additional Information: Would like to speak to the office in ref to scheduling an audiogram at [facility]

## 2024-09-10 NOTE — ASSESSMENT & PLAN NOTE
Monitored with q6mo diagnostic PSA by urologist.  Pt and family would benefit from further counseling on reduced benefit and increased risk of continuing to follow PSA at his age and functional status.

## 2024-09-10 NOTE — ASSESSMENT & PLAN NOTE
Evaluated by Dr. Miller in past; has not been on any antiparkinson therapy and does not appear to need it at this time.  Falls predominantly appear to be related to tripping on uneven pavement when walking the neighborhood.

## 2024-09-10 NOTE — ASSESSMENT & PLAN NOTE
On Gemtesa, managed by a urologist.  Galantamine likely contributing to urinary retention.  Likely would not tolerate tamsulosin given h/o orthostasis, and has h/o prostatectomy so unlikely to benefit from finasteride.

## 2024-09-10 NOTE — ASSESSMENT & PLAN NOTE
Pt's daughter believes he has some sort of history of heart problem but does not recall exactly what.  Presence of bundle branch blocks on prior EKG suggests h/o MI.  On metoprolol succinate 25mg; but not clear if this was the indication for this.  He is not on ASA but daughter wonders if he should be and requests Cardiology referral.

## 2024-09-10 NOTE — ASSESSMENT & PLAN NOTE
Last saw Dr. Ross in Podiatry about 1 y/a for nail trimming; needs that again as well as ongoing treatment for fungal disease.  Will help set up f/u appt with him.

## 2024-09-12 NOTE — ASSESSMENT & PLAN NOTE
Not using CPAP due to difficulty understanding how machine works.  Advised him to discuss with his Sleep Medicine doctor who may know of a less-complex device that would be more senior-friendly.

## 2024-09-19 DIAGNOSIS — E78.5 HYPERLIPIDEMIA, UNSPECIFIED HYPERLIPIDEMIA TYPE: ICD-10-CM

## 2024-09-19 RX ORDER — ATORVASTATIN CALCIUM 40 MG/1
40 TABLET, FILM COATED ORAL DAILY
Qty: 30 TABLET | Refills: 11 | Status: SHIPPED | OUTPATIENT
Start: 2024-09-19

## 2024-09-30 ENCOUNTER — CLINICAL SUPPORT (OUTPATIENT)
Dept: REHABILITATION | Facility: OTHER | Age: 89
End: 2024-09-30
Attending: HOSPITALIST
Payer: MEDICARE

## 2024-09-30 DIAGNOSIS — R26.89 BALANCE DISORDER: Primary | ICD-10-CM

## 2024-09-30 DIAGNOSIS — R26.9 ABNORMAL GAIT: ICD-10-CM

## 2024-09-30 DIAGNOSIS — R29.6 FALLS FREQUENTLY: ICD-10-CM

## 2024-09-30 PROCEDURE — 97112 NEUROMUSCULAR REEDUCATION: CPT | Mod: HCNC,PN | Performed by: PHYSICAL THERAPIST

## 2024-09-30 PROCEDURE — 97162 PT EVAL MOD COMPLEX 30 MIN: CPT | Mod: HCNC,PN | Performed by: PHYSICAL THERAPIST

## 2024-09-30 NOTE — PLAN OF CARE
[facility]   Physical Therapy Initial Evaluation      Name: Hu Lopez  Clinic Number: [id number]    Therapy Diagnosis:   Encounter Diagnoses   Name Primary?    Falls frequently     Balance disorder Yes    Abnormal gait         Physician: Tony Gonzalez, *    Physician Orders: PT Eval and Treat   Medical Diagnosis from Referral:   R29.6 (ICD-10-CM) - Falls frequently   Evaluation Date: 9/30/2024  Authorization Period Expiration: 12/31/2024  Plan of Care Expiration: 11/11/2024  Progress Note Due: 10/30/2024  Visit # / Visits authorized: 1/ 1   FOTO: 9/30/2024    Time In: 3:00 PM  Time Out: 3:50 PM  Total Appointment Time (timed & untimed codes): 50 minutes    Precautions: Standard     Subjective     Date of onset: over the past year    History of current condition - Hu reports:  Per pt daughter present during evaluation (pt speaks broken english and has hearing loss): Pt walks on [place] a lot and his balance has been bad over the past year, pt has dementia, 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 Pt has fallen 2 to 3 times in the past year. He does not exercise as much and needs some general strengthening in order to get better. He has gained some weight over the past year due to lack of exercise. He is very hard of hearing and only wears one hearing aid. Has some dizziness hasn't seen neurologist in over a year.     Medical History:   Past Medical History:   Diagnosis Date    Allergy     Basal cell carcinoma     BPH (benign prostatic hyperplasia)     CAD (coronary artery disease)     Cataracts, bilateral     Diverticulitis     DVT (deep venous thrombosis)     Elevated PSA     GERD (gastroesophageal reflux disease)     Glaucoma     Hyperlipidemia     Hypertension     Prostate cancer     Skin cancer     Sleep apnea     CPAP       Surgical History:   Hu Lopez  has a past surgical history that includes Colon surgery;  "Appendectomy; Colostomy; Revision Colostomy; Colonoscopy; Upper gastrointestinal endoscopy; Skin cancer excision; and Prostate surgery (2008).    Medications:   Hu has a current medication list which includes the following prescription(s): atorvastatin, galantamine, gemtesa, losartan, metoprolol succinate, omeprazole, sertraline, vitamin d, and [DISCONTINUED] multivitamin.    Allergies:   Review of patient's allergies indicates:   Allergen Reactions    Codeine Nausea And Vomiting    Simcor [niacin-simvastatin] Rash    Simvastatin Rash        Imaging, none    Prior Therapy: Yes unclear for what exactly  Social History: Lives in assisted living facility on [facility] with spouse   Occupation: Retired  Prior Level of Function: Modified Independent  Current Level of Function: Modified Independent    Pain:  Current 0/10, worst 0/10, best 0/10   Location:  NA  Description: NA  Aggravating Factors: NA  Easing Factors: NA    Pts goals: Pt would like to reduce falls    Objective   Posture: Forward Head, kyphotic thoracic spine  Gait: Pt demonstrates slow gait speed, "furniture walks" for UE support, decreased step length, wide NACHO  Blood Pressure:   138/83 prior to session- Pt demonstrates signs of Orthostatic Hypotension with change in position throughout evaluation.  Functional Tests:  Timed Up and Go Test: 15 seconds    30 second Sit To Stand: 7       MCCARTY  BALANCE ASSESSMENT TOOL  1. Sitting to Standing   3 - able to stand independely using hands  2. Standing Unsupported   3 - able to stand 2 minutes with supervision  3. Sitting Unsupported   4 - able to sit safely and securely 2 minutes  4. Standing to Sitting   2 - uses back of legs against chair to control descent  5. Pivot Transfer   3 - able to transfer safely with definite use of hands  6. Standing with Eyes Closed   3 - able to stand 10 seconds with supervision  7. Standing with Feet Together   3 - able to place feet together independently and stand for 1 " minute with supervision  8. Reaching Forward with Outstretched Arm   2 - can reach forward 5 cm/2 inches safely  9. Retrieving Object from Floor   2 - unable to  but reaches 2-5 cm from slipper and keeps balance independently  10. Turning to Look Behind   4 - looks behind from both sides and weights shifts well  11. Turning 360 Degrees   2 - able to turn 360 safely but slowly  12. Placing Alternate Foot on Step   1 - able to complete > 2 steps needs min assist  13. Standing with One Foot in Front   1 - need help to step but can hold 15 seconds  14. Standing on One Foot   0 - unable to try or needs assistance to prevent fall    TOTAL SCORE: 33  Maximum: 56  Score:   0-20= high fall risk   21-40 moderate fall risk   41-56 low fall risk     Fall risk cut-off scores:   Elderly and History of falls: <51/56   Elderly and No history of falls: <42/56   CVA: <45/56      Sections of Functional Gait Assessment:  Gait with vertical head turns- Unable to perform independently, pt stopped ambulation while performing head turn  Gait with Horizontal Head Turns- Unable to perform independently, pt stopped ambulation while performing head turn  Gait and Pivot turn- turns slowly requiring small steps to maintain balance  Gait with Narrow NACHO- unable to perform            L/E MMT Right Left Pain/Dysfunction with Movement   Hip Flexion Seated 4-/5 4-/5    Hip Extension NT NT    Hip Abduction/PGM 3/5 3/5    Knee Flexion 4/5 4/5    Knee Extension 4/5 4/5      Joint Mobility:   - Lumbar: NA  - Hip/Knee/Foot/Ankle:  NA    Flexibility: NA      CMS Impairment/Limitation/Restriction for FOTO Survey    Therapist reviewed FOTO scores for Hu Lopez on 9/30/2024.   FOTO documents entered into [employer] - see Media section.           TREATMENT     Treatment Time In: 3:40  Treatment Time Out: 3:50  Total Treatment time separate from Evaluation: 10 minutes    Hu participated in neuromuscular re-education activities to improve: Balance,  Coordination, Kinesthetic, Sense, Proprioception, and Posture for 10 minutes. The following activities were included:  Romberg Stance 30sec 3x  Modified Tandem Stance 30sec 3x  Sit to Stand x10   Home Exercises and Patient Education Provided    Education provided:   - Pt educated on POC  - Pt educated on HEP  - Pt educated on anatomy and physiology of current condition as it relates to signs and symptoms    Written Home Exercises Provided: yes.  Exercises were reviewed and Hu was able to demonstrate them prior to the end of the session.  Hu demonstrated fair  understanding of the education provided.     See EMR under Patient Instructions for exercises provided 9/30/2024.    Assessment     Hu is a 88 y.o. male referred to outpatient Physical Therapy with a medical diagnosis of R29.6 (ICD-10-CM) - Falls frequently. Patient presents with impaired balance and generalized debility evident by 33/56 Frederick Balance Score indicating this patient is at a moderate risk for falls. Pt demonstrates difficulty with tasks requiring dynamic balance and requires CGA to min assist to maintain balance at times. Pt has visual impairment in both eyes contributing to poor balance. Pt will benefit from skilled PT to address the impairments above in order to return to previous level of function and improve quality of life.     Patient prognosis is Fair.   Patient will benefit from skilled outpatient Physical Therapy to address the deficits stated above and in the chart below, provide patient /family education, and to maximize patientt's level of independence.     Plan of care discussed with patient: Yes  Patient's spiritual, cultural and educational needs considered and patient is agreeable to the plan of care and goals as stated below:     Anticipated Barriers for therapy: Age, Language barrier  Medical Necessity is demonstrated by the following  History  Co-morbidities and personal factors that may impact the plan of care []  LOW: no personal factors / co-morbidities  [] MODERATE: 1-2 personal factors / co-morbidities  [x] HIGH: 3+ personal factors / co-morbidities    Moderate / High Support Documentation:   Co-morbidities affecting plan of care: CAD, Prostate Cancer, Hypertension    Personal Factors:   age     Examination  Body Structures and Functions, activity limitations and participation restrictions that may impact the plan of care [] LOW: addressing 1-2 elements  [x] MODERATE: 3+ elements  [] HIGH: 4+ elements (please support below)    Moderate / High Support Documentation: Difficulty with ambulation, Hx of falls, Impaired vision     Clinical Presentation [x] LOW: stable  [] MODERATE: Evolving  [] HIGH: Unstable     Decision Making/ Complexity Score: moderate           Goals:  STG (3 Weeks)  Pt will be independent with Initial home exercise program in order to facilitate Physical Therapy treatment  Pt will improve Timed up and go time to 12 seconds in order to reduce risk for falls.  Pt will improve hip abduction PGM strength to 3+/5 in order to improve function  Pt will improve BBS score to 39/56 in order to reduce risk for falls.    LTG(6 weeks)  Pt will be independent c final home exercise program in order to DC to home program  Pt will improve FOTO limitation to 53% indicative of overall improvement in function   Pt will improve hip abduction PGM strength to 4/5 in order to improve function.  Pt will improve BBS to 44/56 in order to reduce risk for falls and improve overall function.    Plan     Plan of care Certification: 9/30/2024 to 11/11/2024    Outpatient Physical Therapy 1 times weekly for 6 weeks to include the following interventions: Cervical/Lumbar Traction, Electrical Stimulation PRN, Gait Training, Manual Therapy, Moist Heat/ Ice, Neuromuscular Re-ed, Patient Education, Self Care, Therapeutic Activities, and Therapeutic Exercise.     Rene Manning, SPT Co Eval with Margie Coleman PT, DPT, OCS

## 2024-10-08 ENCOUNTER — CLINICAL SUPPORT (OUTPATIENT)
Dept: REHABILITATION | Facility: OTHER | Age: 89
End: 2024-10-08
Payer: MEDICARE

## 2024-10-08 DIAGNOSIS — R26.9 ABNORMAL GAIT: ICD-10-CM

## 2024-10-08 DIAGNOSIS — R26.89 BALANCE DISORDER: Primary | ICD-10-CM

## 2024-10-08 PROCEDURE — 97112 NEUROMUSCULAR REEDUCATION: CPT | Mod: HCNC,PN | Performed by: PHYSICAL THERAPIST

## 2024-10-08 NOTE — PROGRESS NOTES
"[facility]   Physical Therapy Treatment Note      Name: Hu Lopez  Clinic Number: [id number]    Therapy Diagnosis: No diagnosis found.  Physician: Tony Gonzalez, *    Visit Date: 10/8/2024  Physician Orders: PT Eval and Treat   Medical Diagnosis from Referral:   R29.6 (ICD-10-CM) - Falls frequently   Evaluation Date: 9/30/2024  Authorization Period Expiration: 12/31/2024  Plan of Care Expiration: 11/11/2024  Progress Note Due: 10/30/2024  Visit # / Visits authorized: 1/ 1   FOTO: 9/30/2024      Time In: ***  Time Out: ***  Total Billable Time: *** minutes    Subjective     Pt reports: ***.  He {Actions; was/was not:67498} compliant with home exercise program.  Response to previous treatment: ***  Functional change: ***    Pain: 0/10  Location: NA      Objective      Objective Measures updated at progress report unless specified.     Treatment     Hu received the treatments listed below:    Hu participated in neuromuscular re-education activities to improve: {AMB PT PROGRESS NEURO RE-ED:04483} for *** minutes. The following activities were included:  Sit to Stand 3x10  Standing hip abduction  Standing Hip extension  //bars Tandem Stance 30sec 3x  // Bars cone taps 30sec 3x  Forward Hurdles  Lateral Hurdles       Bold = Performed    Patient Education and Home Exercises       Education provided:   - ***    Written Home Exercises Provided: {Blank single:31429::"yes","Patient instructed to cont prior HEP"}. Exercises were reviewed and Hu was able to demonstrate them prior to the end of the session.  Hu demonstrated {Desc; good/fair/poor:19611} understanding of the education provided. See EMR under Patient Instructions for exercises provided during therapy sessions    Assessment     ***    Hu {IS/IS NOT:73147} progressing well towards his goals.   Pt prognosis is {REHAB PROGNOSIS OHS:76853}.     Pt will continue to benefit from skilled outpatient physical therapy " to address the deficits listed in the problem list box on initial evaluation, provide pt/family education and to maximize pt's level of independence in the home and community environment.     Pt's spiritual, cultural and educational needs considered and pt agreeable to plan of care and goals.     Anticipated barriers to physical therapy: ***    Goals: ***    Plan     ***    Rene Manning, SPT, DPT, OCS

## 2024-10-08 NOTE — PROGRESS NOTES
[facility]   Physical Therapy Treatment Note      Name: Hu Lopez  Clinic Number: [id number]    Therapy Diagnosis:   Encounter Diagnoses   Name Primary?    Balance disorder Yes    Abnormal gait      Physician: Tony Gonzalez, *    Visit Date: 10/8/2024  Physician Orders: PT Eval and Treat   Medical Diagnosis from Referral:   R29.6 (ICD-10-CM) - Falls frequently   Evaluation Date: 9/30/2024  Authorization Period Expiration: 12/31/2024  Plan of Care Expiration: 11/11/2024  Progress Note Due: 10/30/2024  Visit # / Visits authorized: 1/20   FOTO: 9/30/2024      Time In: 1:03 pm  Time Out: 2: 00 pm  Total Billable Time: 57 minutes    Subjective     Pt reports: Doing well, no pain or any other complaints, legs just get tired quick.    Pain: 0/10  Location: NA      Objective      Objective Measures updated at progress report unless specified.     Treatment     Hu received the treatments listed below:    Hu participated in neuromuscular re-education activities to improve: Balance, Coordination, Kinesthetic, Sense, Proprioception, and Posture for 55 minutes. The following activities were included:  Nu Step 5 min lvl 2  Sit to Stand 3x10  Standing hip abduction 2x10 B  Standing Hip extension 2x10 B  //bars Tandem Stance with foam 30sec 3x- CGA with gait belt  // Bars cone taps 30sec 3x -CGA with gait belt  Forward Hurdles 2 laps - CGA with gait belt  Lateral Hurdles- CGA with gait belt  Gait with horizontal head turns 4 laps  LAQ 4# 3x10 B    Bold = Performed    Patient Education and Home Exercises       Education provided:   - Pt educated on POC  - Pt educated on HEP  - Pt educated on anatomy and physiology of current condition as it relates to signs and symptoms    Written Home Exercises Provided: yes. Exercises were reviewed and Hu was able to demonstrate them prior to the end of the session.  Hu demonstrated fair  understanding of the education provided. See EMR under  Patient Instructions for exercises provided during therapy sessions    Assessment   Hu presents to PT with no complaints and was able to tolerate balance/LE functional strengthening exercises with increased time for rests in between each intervention. He requires CGA to min assist for all balance interventions at this time. Continue to address balance/general LE functional strength.    Hu Is progressing well towards his goals.   Pt prognosis is Fair.     Pt will continue to benefit from skilled outpatient physical therapy to address the deficits listed in the problem list box on initial evaluation, provide pt/family education and to maximize pt's level of independence in the home and community environment.     Pt's spiritual, cultural and educational needs considered and pt agreeable to plan of care and goals.     Anticipated barriers to physical therapy: Age     Goals:   STG (3 Weeks)  Pt will be independent with Initial home exercise program in order to facilitate Physical Therapy treatment  Pt will improve Timed up and go time to 12 seconds in order to reduce risk for falls.  Pt will improve hip abduction PGM strength to 3+/5 in order to improve function  Pt will improve BBS score to 39/56 in order to reduce risk for falls.     LTG(6 weeks)  Pt will be independent c final home exercise program in order to DC to home program  Pt will improve FOTO limitation to 53% indicative of overall improvement in function   Pt will improve hip abduction PGM strength to 4/5 in order to improve function.  Pt will improve BBS to 44/56 in order to reduce risk for falls and improve overall function.    Plan   Continue to address LE functional strength and dynamic balance.    Rene Manning, SPT co treat with Margie Coleman PT, DPT, OCS

## 2024-10-15 ENCOUNTER — CLINICAL SUPPORT (OUTPATIENT)
Dept: REHABILITATION | Facility: OTHER | Age: 89
End: 2024-10-15
Payer: MEDICARE

## 2024-10-15 DIAGNOSIS — R26.89 BALANCE DISORDER: Primary | ICD-10-CM

## 2024-10-15 DIAGNOSIS — R26.9 ABNORMAL GAIT: ICD-10-CM

## 2024-10-15 PROCEDURE — 97112 NEUROMUSCULAR REEDUCATION: CPT | Mod: HCNC,PN | Performed by: PHYSICAL THERAPIST

## 2024-10-15 NOTE — PROGRESS NOTES
[facility]   Physical Therapy Treatment Note      Name: Hu Lopez  Clinic Number: [id number]    Therapy Diagnosis:   No diagnosis found.    Physician: Tony Gonzalez, *    Visit Date: 10/15/2024  Physician Orders: PT Eval and Treat   Medical Diagnosis from Referral:   R29.6 (ICD-10-CM) - Falls frequently   Evaluation Date: 9/30/2024  Authorization Period Expiration: 12/31/2024  Plan of Care Expiration: 11/11/2024  Progress Note Due: 10/30/2024  Visit # / Visits authorized: 1/20   FOTO: 9/30/2024      Time In: 1:03 pm  Time Out: 2: 00 pm  Total Billable Time: 57 minutes    Subjective     Pt reports: Doing well, no pain or any other complaints, legs just get tired quick.    Pain: 0/10  Location: NA      Objective      Objective Measures updated at progress report unless specified.     Treatment     Hu received the treatments listed below:    Hu participated in neuromuscular re-education activities to improve: Balance, Coordination, Kinesthetic, Sense, Proprioception, and Posture for 55 minutes. The following activities were included:  Nu Step 5 min lvl 2  Sit to Stand 3x10  Standing hip abduction 2x10 B  Standing Hip extension 2x10 B  //bars Tandem Stance with foam 30sec 3x- CGA with gait belt  // Bars cone taps 30sec 3x -CGA with gait belt  Forward Hurdles 2 laps - CGA with gait belt  Lateral Hurdles 2 laps- CGA with gait belt  Gait with horizontal head turns 4 laps  LAQ 4# 3x10 B    Bold = Performed    Patient Education and Home Exercises       Education provided:   - Pt educated on POC  - Pt educated on HEP  - Pt educated on anatomy and physiology of current condition as it relates to signs and symptoms    Written Home Exercises Provided: yes. Exercises were reviewed and Hu was able to demonstrate them prior to the end of the session.  Hu demonstrated fair  understanding of the education provided. See EMR under Patient Instructions for exercises provided during  therapy sessions    Assessment   Hu presents to PT with no complaints and was able to tolerate balance/LE functional strengthening exercises with increased time for rests in between each intervention. He requires CGA to min assist for all balance interventions at this time. Continue to address balance/general LE functional strength.    Hu Is progressing well towards his goals.   Pt prognosis is Fair.     Pt will continue to benefit from skilled outpatient physical therapy to address the deficits listed in the problem list box on initial evaluation, provide pt/family education and to maximize pt's level of independence in the home and community environment.     Pt's spiritual, cultural and educational needs considered and pt agreeable to plan of care and goals.     Anticipated barriers to physical therapy: Age     Goals:   STG (3 Weeks)  Pt will be independent with Initial home exercise program in order to facilitate Physical Therapy treatment  Pt will improve Timed up and go time to 12 seconds in order to reduce risk for falls.  Pt will improve hip abduction PGM strength to 3+/5 in order to improve function  Pt will improve BBS score to 39/56 in order to reduce risk for falls.     LTG(6 weeks)  Pt will be independent c final home exercise program in order to DC to home program  Pt will improve FOTO limitation to 53% indicative of overall improvement in function   Pt will improve hip abduction PGM strength to 4/5 in order to improve function.  Pt will improve BBS to 44/56 in order to reduce risk for falls and improve overall function.    Plan   Continue to address LE functional strength and dynamic balance.    Rene Manning, SPT co treat with Margie Coleman PT, DPT, OCS

## 2024-10-15 NOTE — PROGRESS NOTES
"[facility]   Physical Therapy Treatment Note      Name: Hu Lopez  Clinic Number: [id number]    Therapy Diagnosis:   Encounter Diagnoses   Name Primary?    Balance disorder Yes    Abnormal gait        Physician: Tony Gonzalez, *    Visit Date: 10/15/2024  Physician Orders: PT Eval and Treat   Medical Diagnosis from Referral:   R29.6 (ICD-10-CM) - Falls frequently   Evaluation Date: 9/30/2024  Authorization Period Expiration: 12/31/2024  Plan of Care Expiration: 11/11/2024  Progress Note Due: 10/30/2024  Visit # / Visits authorized: 2/20   FOTO: 9/30/2024      Time In: 1:03 pm  Time Out: 2: 00 pm  Total Billable Time: 57 minutes    Subjective     Pt reports: Didn't sleep well last night just feeling tired today.    Pain: 0/10  Location: NA      Objective      Objective Measures updated at progress report unless specified.     Treatment     Hu received the treatments listed below:    Hu participated in neuromuscular re-education activities to improve: Balance, Coordination, Kinesthetic, Sense, Proprioception, and Posture for 55 minutes. The following activities were included:  Sit to Stand 3x10  Standing hip abduction 2x10 B  Standing Hip extension 2x10 B  //bars Tandem Stance with foam 30sec 3x- CGA with gait belt  // Bars cone taps 30sec 3x -CGA with gait belt  Forward Hurdles 2 laps - CGA with gait belt  Lateral Hurdles 2 laps- CGA with gait belt  Gait with horizontal head turns 4 laps  LAQ 4# 3x10 B  Step ups 4" 2x10  Not today  Nu Step 5 min lvl 2    Bold = Performed    Patient Education and Home Exercises       Education provided:   - Pt educated on POC  - Pt educated on HEP  - Pt educated on anatomy and physiology of current condition as it relates to signs and symptoms    Written Home Exercises Provided: yes. Exercises were reviewed and Hu was able to demonstrate them prior to the end of the session.  Hu demonstrated fair  understanding of the education " provided. See EMR under Patient Instructions for exercises provided during therapy sessions    Assessment   Hu presents to PT with no complaints and was able to tolerate balance/LE functional strengthening exercises with increased time for rests in between each intervention. He requires CGA to min assist for all balance interventions at this time. Continue to address balance/general LE functional strength.    Hu Is progressing well towards his goals.   Pt prognosis is Fair.     Pt will continue to benefit from skilled outpatient physical therapy to address the deficits listed in the problem list box on initial evaluation, provide pt/family education and to maximize pt's level of independence in the home and community environment.     Pt's spiritual, cultural and educational needs considered and pt agreeable to plan of care and goals.     Anticipated barriers to physical therapy: Age     Goals:   STG (3 Weeks)  Pt will be independent with Initial home exercise program in order to facilitate Physical Therapy treatment  Pt will improve Timed up and go time to 12 seconds in order to reduce risk for falls.  Pt will improve hip abduction PGM strength to 3+/5 in order to improve function  Pt will improve BBS score to 39/56 in order to reduce risk for falls.     LTG(6 weeks)  Pt will be independent c final home exercise program in order to DC to home program  Pt will improve FOTO limitation to 53% indicative of overall improvement in function   Pt will improve hip abduction PGM strength to 4/5 in order to improve function.  Pt will improve BBS to 44/56 in order to reduce risk for falls and improve overall function.    Plan   Continue to address LE functional strength and dynamic balance.    Rene Manning, SPT co treat with Margie Coleman PT, DPT, OCS

## 2024-10-18 ENCOUNTER — OFFICE VISIT (OUTPATIENT)
Dept: INTERNAL MEDICINE | Facility: CLINIC | Age: 89
End: 2024-10-18
Payer: MEDICARE

## 2024-10-18 VITALS
OXYGEN SATURATION: 98 % | WEIGHT: 211.63 LBS | BODY MASS INDEX: 31.34 KG/M2 | HEART RATE: 116 BPM | DIASTOLIC BLOOD PRESSURE: 80 MMHG | HEIGHT: 69 IN | SYSTOLIC BLOOD PRESSURE: 140 MMHG

## 2024-10-18 DIAGNOSIS — J02.9 SORE THROAT: ICD-10-CM

## 2024-10-18 DIAGNOSIS — R05.1 ACUTE COUGH: Primary | ICD-10-CM

## 2024-10-18 LAB
CTP QC/QA: YES
S PYO RRNA THROAT QL PROBE: NEGATIVE

## 2024-10-18 PROCEDURE — 99999 PR PBB SHADOW E&M-EST. PATIENT-LVL III: CPT | Mod: PBBFAC,HCNC,,

## 2024-10-18 RX ORDER — PROMETHAZINE HYDROCHLORIDE AND DEXTROMETHORPHAN HYDROBROMIDE 6.25; 15 MG/5ML; MG/5ML
5 SYRUP ORAL EVERY 8 HOURS PRN
Qty: 118 ML | Refills: 0 | Status: SHIPPED | OUTPATIENT
Start: 2024-10-18 | End: 2024-10-28

## 2024-10-18 RX ORDER — BENZONATATE 200 MG/1
200 CAPSULE ORAL 3 TIMES DAILY PRN
Qty: 21 CAPSULE | Refills: 0 | Status: SHIPPED | OUTPATIENT
Start: 2024-10-18 | End: 2024-10-28

## 2024-10-18 NOTE — PROGRESS NOTES
CHIEF COMPLAINT     Chief Complaint   Patient presents with    Sore Throat    Cough    Nasal Congestion       HPI     Hu Lopez is a 88 y.o. male who presents for xxx today.    PCP is Sayda Rendon MD, patient is new to me. Pt reports cough and sore throat x 8 days. Denies fever.          Past Medical History:  Past Medical History:   Diagnosis Date    Allergy     Basal cell carcinoma     BPH (benign prostatic hyperplasia)     CAD (coronary artery disease)     Cataracts, bilateral     Diverticulitis     DVT (deep venous thrombosis)     Elevated PSA     GERD (gastroesophageal reflux disease)     Glaucoma     Hyperlipidemia     Hypertension     Prostate cancer     Skin cancer     Sleep apnea     CPAP       Home Medications:  Prior to Admission medications    Medication Sig Start Date End Date Taking? Authorizing Provider   atorvastatin (LIPITOR) 40 MG tablet Take 1 tablet (40 mg total) by mouth once daily. 9/19/24  Yes Tony Gonzalez MD   galantamine (RAZADYNE) 4 MG Tab Take 1 tablet (4 mg total) by mouth 2 (two) times daily with meals. 8/23/24 8/23/25 Yes Trudi Lai DNP   GEMTESA 75 mg Tab Take 75 mg by mouth once daily. 2/17/24  Yes Adalberto Cosme MD   losartan (COZAAR) 50 MG tablet Take 1 tablet (50 mg total) by mouth once daily. 8/23/24 8/23/25 Yes Trudi Lai DNP   metoprolol succinate (TOPROL-XL) 25 MG 24 hr tablet Take 1 tablet (25 mg total) by mouth once daily. 5/14/24 5/14/25 Yes Sayda Rendon MD   omeprazole (PRILOSEC) 40 MG capsule Take 1 capsule (40 mg total) by mouth every morning. 8/23/24  Yes Trudi Lai DNP   sertraline (ZOLOFT) 100 MG tablet Take 1 tablet (100 mg total) by mouth once daily. 12/15/23  Yes Sayda Rendon MD   vitamin D (VITAMIN D3) 1000 units Tab Take 1 tablet (1,000 Units total) by mouth once daily. 5/14/24  Yes Sayda Rendon MD   benzonatate (TESSALON) 200 MG capsule Take 1 capsule (200 mg total) by mouth 3  "(three) times daily as needed for Cough. 10/18/24 10/28/24  Paramjit Palafox, NP   promethazine-dextromethorphan (PROMETHAZINE-DM) 6.25-15 mg/5 mL Syrp Take 5 mLs by mouth every 8 (eight) hours as needed (For cough. Do not operate heavy machinery. Will cause drowsiness.). 10/18/24 10/28/24  Paramjit Palafox, NP   multivitamin (THERAGRAN) per tablet Take 1 tablet by mouth once daily. 9/5/22 9/12/22  Elle Sharma, SEEMA       Review of Systems:  Review of Systems   Constitutional: Negative.    HENT:  Positive for sore throat.    Respiratory:  Positive for cough.    Cardiovascular: Negative.        Health Maintainence:   Immunizations:  Health Maintenance         Date Due Completion Date    RSV Vaccine (Age 60+ and Pregnant patients) (1 - 1-dose 75+ series) Never done ---    Shingles Vaccine (2 of 2) 07/11/2023 5/16/2023    COVID-19 Vaccine (6 - 2024-25 season) 09/01/2024 10/11/2022    Lipid Panel 09/09/2029 9/9/2024    TETANUS VACCINE 09/09/2034 9/9/2024    Override on 6/15/2014: Done             PHYSICAL EXAM     BP (!) 140/80   Pulse (!) 116   Ht 5' 9" (1.753 m)   Wt 96 kg (211 lb 10.3 oz)   SpO2 98%   BMI 31.25 kg/m²     Physical Exam  Vitals reviewed.   Constitutional:       Appearance: He is well-developed.   HENT:      Head: Normocephalic and atraumatic.      Mouth/Throat:      Pharynx: Posterior oropharyngeal erythema present.   Eyes:      Conjunctiva/sclera: Conjunctivae normal.   Cardiovascular:      Rate and Rhythm: Normal rate.      Heart sounds: Normal heart sounds.   Pulmonary:      Effort: Pulmonary effort is normal. No respiratory distress.      Breath sounds: Examination of the right-lower field reveals wheezing. Examination of the left-lower field reveals wheezing. Wheezing present.   Skin:     General: Skin is warm and dry.      Findings: No rash.   Neurological:      Mental Status: He is alert and oriented to person, place, and time.      Coordination: Coordination normal. "   Psychiatric:         Behavior: Behavior normal.         LABS     Lab Results   Component Value Date    HGBA1C 5.5 06/20/2017     CMP  Sodium   Date Value Ref Range Status   09/09/2024 138 136 - 145 mmol/L Final     Potassium   Date Value Ref Range Status   09/09/2024 4.3 3.5 - 5.1 mmol/L Final     Chloride   Date Value Ref Range Status   09/09/2024 108 95 - 110 mmol/L Final     CO2   Date Value Ref Range Status   09/09/2024 22 (L) 23 - 29 mmol/L Final     Glucose   Date Value Ref Range Status   09/09/2024 143 (H) 70 - 110 mg/dL Final     BUN   Date Value Ref Range Status   09/09/2024 33 (H) 8 - 23 mg/dL Final     Creatinine   Date Value Ref Range Status   09/09/2024 1.6 (H) 0.5 - 1.4 mg/dL Final     Calcium   Date Value Ref Range Status   09/09/2024 9.1 8.7 - 10.5 mg/dL Final     Total Protein   Date Value Ref Range Status   08/31/2022 6.8 6.0 - 8.4 g/dL Final     Albumin   Date Value Ref Range Status   08/31/2022 4.0 3.5 - 5.2 g/dL Final     Total Bilirubin   Date Value Ref Range Status   08/31/2022 0.7 0.1 - 1.0 mg/dL Final     Comment:     For infants and newborns, interpretation of results should be based  on gestational age, weight and in agreement with clinical  observations.    Premature Infant recommended reference ranges:  Up to 24 hours.............<8.0 mg/dL  Up to 48 hours............<12.0 mg/dL  3-5 days..................<15.0 mg/dL  6-29 days.................<15.0 mg/dL       Alkaline Phosphatase   Date Value Ref Range Status   08/31/2022 73 55 - 135 U/L Final     AST   Date Value Ref Range Status   08/31/2022 21 10 - 40 U/L Final     ALT   Date Value Ref Range Status   08/31/2022 21 10 - 44 U/L Final     Anion Gap   Date Value Ref Range Status   09/09/2024 8 8 - 16 mmol/L Final     eGFR if    Date Value Ref Range Status   07/13/2018 54 (A) >60 mL/min/1.73 m^2 Final     eGFR if non    Date Value Ref Range Status   07/13/2018 46 (A) >60 mL/min/1.73 m^2 Final      Comment:     Calculation used to obtain the estimated glomerular filtration  rate (eGFR) is the CKD-EPI equation.        Lab Results   Component Value Date    WBC 6.75 09/09/2024    HGB 13.4 (L) 09/09/2024    HCT 40.5 09/09/2024     (H) 09/09/2024     09/09/2024     Lab Results   Component Value Date    CHOL 140 09/09/2024    CHOL 142 08/25/2022    CHOL 165 07/13/2018     Lab Results   Component Value Date    HDL 35 (L) 09/09/2024    HDL 42 08/25/2022    HDL 43 07/13/2018     Lab Results   Component Value Date    LDLCALC 74.0 09/09/2024    LDLCALC 82.6 08/25/2022    LDLCALC 101.0 07/13/2018     Lab Results   Component Value Date    TRIG 155 (H) 09/09/2024    TRIG 87 08/25/2022    TRIG 105 07/13/2018     Lab Results   Component Value Date    CHOLHDL 25.0 09/09/2024    CHOLHDL 29.6 08/25/2022    CHOLHDL 26.1 07/13/2018     Lab Results   Component Value Date    TSH 1.571 05/16/2023    [id number] 7.0 08/31/2022       ASSESSMENT/PLAN          Hu was seen today for sore throat, cough and nasal congestion.    Diagnoses and all orders for this visit:    Acute cough  -     benzonatate (TESSALON) 200 MG capsule; Take 1 capsule (200 mg total) by mouth 3 (three) times daily as needed for Cough.  -     promethazine-dextromethorphan (PROMETHAZINE-DM) 6.25-15 mg/5 mL Syrp; Take 5 mLs by mouth every 8 (eight) hours as needed (For cough. Do not operate heavy machinery. Will cause drowsiness.).          Paramjit Palafox NP   Department of Internal Medicine - [facility]  2:28 PM

## 2024-10-24 ENCOUNTER — TELEPHONE (OUTPATIENT)
Dept: CARDIOLOGY | Facility: CLINIC | Age: 89
End: 2024-10-24
Payer: MEDICARE

## 2024-10-24 DIAGNOSIS — R00.0 TACHYCARDIA: Primary | ICD-10-CM

## 2024-10-25 ENCOUNTER — HOSPITAL ENCOUNTER (OUTPATIENT)
Dept: CARDIOLOGY | Facility: CLINIC | Age: 89
Discharge: HOME | End: 2024-10-25
Payer: MEDICARE

## 2024-10-25 ENCOUNTER — OFFICE VISIT (OUTPATIENT)
Dept: CARDIOLOGY | Facility: CLINIC | Age: 89
End: 2024-10-25
Payer: MEDICARE

## 2024-10-25 VITALS
DIASTOLIC BLOOD PRESSURE: 69 MMHG | SYSTOLIC BLOOD PRESSURE: 132 MMHG | HEART RATE: 84 BPM | BODY MASS INDEX: 30.85 KG/M2 | WEIGHT: 208.31 LBS | HEIGHT: 69 IN | OXYGEN SATURATION: 97 %

## 2024-10-25 DIAGNOSIS — R00.0 TACHYCARDIA: ICD-10-CM

## 2024-10-25 DIAGNOSIS — I70.0 AORTIC ATHEROSCLEROSIS: ICD-10-CM

## 2024-10-25 DIAGNOSIS — I10 ESSENTIAL HYPERTENSION: ICD-10-CM

## 2024-10-25 DIAGNOSIS — I47.10 SVT (SUPRAVENTRICULAR TACHYCARDIA): ICD-10-CM

## 2024-10-25 DIAGNOSIS — I25.10 CORONARY ARTERY CALCIFICATION: Primary | ICD-10-CM

## 2024-10-25 DIAGNOSIS — E78.5 HYPERLIPIDEMIA, UNSPECIFIED HYPERLIPIDEMIA TYPE: ICD-10-CM

## 2024-10-25 LAB
OHS QRS DURATION: 84 MS
OHS QTC CALCULATION: 437 MS

## 2024-10-25 PROCEDURE — 99999 PR PBB SHADOW E&M-EST. PATIENT-LVL IV: CPT | Mod: PBBFAC,HCNC,GC, | Performed by: STUDENT IN AN ORGANIZED HEALTH CARE EDUCATION/TRAINING PROGRAM

## 2024-10-25 PROCEDURE — 93010 ELECTROCARDIOGRAM REPORT: CPT | Mod: HCNC,S$GLB,, | Performed by: INTERNAL MEDICINE

## 2024-10-25 RX ORDER — ASPIRIN 81 MG/1
81 TABLET ORAL DAILY
Qty: 30 TABLET | Refills: 10 | Status: SHIPPED | OUTPATIENT
Start: 2024-10-25 | End: 2025-10-25

## 2024-10-25 NOTE — PROGRESS NOTES
PCP - Sayda Rendon MD  Referring Physician:     Subjective:   Patient ID:  Hu Lopez is a 88 y.o. y.o. male who presents for evaluation and treatment of coronary artery disease (based on CT), HTN and HLD. Patient is a new to [place] and moved from [place]. He was previously seen by a cardiologist about a decade ago.     Patient denies any chest pain, shortness of breath, palpitations or edema. No history of heart failure.     History:     Social History     Tobacco Use    Smoking status: Never    Smokeless tobacco: Never   Substance Use Topics    Alcohol use: Yes     Alcohol/week: 0.0 standard drinks of alcohol     Comment: rare     Family History   Problem Relation Name Age of Onset    Prostate cancer Father      Colon cancer Paternal Uncle      Stroke Paternal Uncle      Depression Paternal Uncle      No Known Problems Mother      Heart attack Brother 2 70       Meds:     Review of patient's allergies indicates:   Allergen Reactions    Codeine Nausea And Vomiting    Simcor [niacin-simvastatin] Rash    Simvastatin Rash       Current Outpatient Medications:     atorvastatin (LIPITOR) 40 MG tablet, Take 1 tablet (40 mg total) by mouth once daily., Disp: 30 tablet, Rfl: 11    benzonatate (TESSALON) 200 MG capsule, Take 1 capsule (200 mg total) by mouth 3 (three) times daily as needed for Cough., Disp: 21 capsule, Rfl: 0    galantamine (RAZADYNE) 4 MG Tab, Take 1 tablet (4 mg total) by mouth 2 (two) times daily with meals., Disp: 60 tablet, Rfl: 11    GEMTESA 75 mg Tab, Take 75 mg by mouth once daily., Disp: 30 tablet, Rfl: 11    losartan (COZAAR) 50 MG tablet, Take 1 tablet (50 mg total) by mouth once daily., Disp: 90 tablet, Rfl: 3    metoprolol succinate (TOPROL-XL) 25 MG 24 hr tablet, Take 1 tablet (25 mg total) by mouth once daily., Disp: 90 tablet, Rfl: 3    omeprazole (PRILOSEC) 40 MG capsule, Take 1 capsule (40 mg total) by mouth every morning., Disp: 30 capsule, Rfl: 11     "promethazine-dextromethorphan (PROMETHAZINE-DM) 6.25-15 mg/5 mL Syrp, Take 5 mLs by mouth every 8 (eight) hours as needed (For cough. Do not operate heavy machinery. Will cause drowsiness.)., Disp: 118 mL, Rfl: 0    sertraline (ZOLOFT) 100 MG tablet, Take 1 tablet (100 mg total) by mouth once daily., Disp: 90 tablet, Rfl: 3    vitamin D (VITAMIN D3) 1000 units Tab, Take 1 tablet (1,000 Units total) by mouth once daily., Disp: 90 tablet, Rfl: 3    aspirin (ECOTRIN) 81 MG EC tablet, Take 1 tablet (81 mg total) by mouth once daily., Disp: 30 tablet, Rfl: 10    Review of Systems   Constitutional: Negative.    HENT: Negative.     Eyes: Negative.    Respiratory: Negative.  Negative for shortness of breath.    Cardiovascular:  Negative for chest pain, palpitations, orthopnea, claudication, leg swelling and PND.   Gastrointestinal: Negative.    Genitourinary: Negative.    Musculoskeletal: Negative.    Skin: Negative.    Neurological: Negative.    Endo/Heme/Allergies: Negative.    Psychiatric/Behavioral: Negative.         Objective:   /69 (Patient Position: Sitting)   Pulse 84   Ht 5' 9" (1.753 m)   Wt 94.5 kg (208 lb 5.4 oz)   SpO2 97%   BMI 30.77 kg/m²   Physical Exam  Vitals and nursing note reviewed.   Constitutional:       General: He is not in acute distress.     Appearance: Normal appearance. He is obese. He is not diaphoretic.   HENT:      Head: Normocephalic and atraumatic.      Mouth/Throat:      Mouth: Mucous membranes are moist.   Eyes:      General: No scleral icterus.     Extraocular Movements: Extraocular movements intact.      Pupils: Pupils are equal, round, and reactive to light.   Neck:      Vascular: No carotid bruit, hepatojugular reflux or JVD.   Cardiovascular:      Rate and Rhythm: Normal rate and regular rhythm.      Pulses: Normal pulses.      Heart sounds: Normal heart sounds. No murmur heard.     No friction rub.   Pulmonary:      Effort: Pulmonary effort is normal. No respiratory " "distress.      Breath sounds: Normal breath sounds. No wheezing.   Chest:      Chest wall: No tenderness.   Abdominal:      General: Abdomen is flat. Bowel sounds are normal. There is no distension.      Palpations: Abdomen is soft.      Tenderness: There is no abdominal tenderness.   Musculoskeletal:      Right lower leg: No edema.      Left lower leg: No edema.   Skin:     General: Skin is warm and dry.      Capillary Refill: Capillary refill takes less than 2 seconds.      Findings: No rash or wound.   Neurological:      General: No focal deficit present.      Mental Status: He is alert and oriented to person, place, and time.   Psychiatric:         Mood and Affect: Mood normal.         Behavior: Behavior normal.         Thought Content: Thought content normal.         Labs:     Lab Results   Component Value Date     09/09/2024    K 4.3 09/09/2024     09/09/2024    CO2 22 (L) 09/09/2024    BUN 33 (H) 09/09/2024    CREATININE 1.6 (H) 09/09/2024    ANIONGAP 8 09/09/2024     Lab Results   Component Value Date    HGBA1C 5.5 06/20/2017     No results found for: "BNP", "BNPTRIAGEBLO"    Lab Results   Component Value Date    WBC 6.75 09/09/2024    HGB 13.4 (L) 09/09/2024    HCT 40.5 09/09/2024     09/09/2024    GRAN 4.4 09/09/2024    GRAN 64.4 09/09/2024     Lab Results   Component Value Date    CHOL 140 09/09/2024    HDL 35 (L) 09/09/2024    LDLCALC 74.0 09/09/2024    TRIG 155 (H) 09/09/2024       Lab Results   Component Value Date     09/09/2024    K 4.3 09/09/2024     09/09/2024    CO2 22 (L) 09/09/2024    BUN 33 (H) 09/09/2024    CREATININE 1.6 (H) 09/09/2024    ANIONGAP 8 09/09/2024     Lab Results   Component Value Date    HGBA1C 5.5 06/20/2017     No results found for: "BNP", "BNPTRIAGEBLO" Lab Results   Component Value Date    WBC 6.75 09/09/2024    HGB 13.4 (L) 09/09/2024    HCT 40.5 09/09/2024     09/09/2024    GRAN 4.4 09/09/2024    GRAN 64.4 09/09/2024     Lab Results "   Component Value Date    CHOL 140 09/09/2024    HDL 35 (L) 09/09/2024    LDLCALC 74.0 09/09/2024    TRIG 155 (H) 09/09/2024                Cardiovascular Imaging:     Echo 7/22/2015  Normal EF 65% mild aortic aortic regurgitation. Normal atrial sizes    Assessment & Plan:     1. Coronary artery calcification    2. SVT (supraventricular tachycardia)    3. Essential hypertension    4. Hyperlipidemia, unspecified hyperlipidemia type    5. Aortic atherosclerosis      Patient without anginal symptoms  Continue Statin last ldl 74  Continue losartan and metoprolol  Start baby ASA  No need for further cardiac testing at this point       Signed:  Olga Lidia HUFFMAN M.D.  Page # [phone]  Cardiovascular Fellow  [facility]

## 2024-10-29 ENCOUNTER — CLINICAL SUPPORT (OUTPATIENT)
Dept: REHABILITATION | Facility: OTHER | Age: 89
End: 2024-10-29
Payer: MEDICARE

## 2024-10-29 DIAGNOSIS — Z74.09 IMPAIRED FUNCTIONAL MOBILITY, BALANCE, GAIT, AND ENDURANCE: Primary | ICD-10-CM

## 2024-10-29 PROCEDURE — 97112 NEUROMUSCULAR REEDUCATION: CPT | Mod: KX,HCNC,PN | Performed by: PHYSICAL THERAPIST

## 2024-10-31 DIAGNOSIS — R05.1 ACUTE COUGH: ICD-10-CM

## 2024-10-31 RX ORDER — BENZONATATE 200 MG/1
200 CAPSULE ORAL 3 TIMES DAILY PRN
Qty: 21 CAPSULE | Refills: 0 | Status: SHIPPED | OUTPATIENT
Start: 2024-10-31 | End: 2024-11-10

## 2024-11-05 ENCOUNTER — CLINICAL SUPPORT (OUTPATIENT)
Dept: REHABILITATION | Facility: OTHER | Age: 89
End: 2024-11-05
Payer: MEDICARE

## 2024-11-05 DIAGNOSIS — R26.89 BALANCE DISORDER: Primary | ICD-10-CM

## 2024-11-05 DIAGNOSIS — R26.9 ABNORMAL GAIT: ICD-10-CM

## 2024-11-05 PROCEDURE — 97112 NEUROMUSCULAR REEDUCATION: CPT | Mod: KX,HCNC,PN | Performed by: PHYSICAL THERAPIST

## 2024-11-05 NOTE — PROGRESS NOTES
[facility] [address]    [address]    [address]    [facility] [address]    Phone:  [phone]    Fax:  [phone]       Thank You for choosing us for your health care visit. We are glad to serve you and happy to provide you with this summary of your visit. Please help us to ensure we have accurate records. If you find anything that needs to be changed, please let our staff know as soon as possible.          Your Demographic Information     Patient Name Sex Megha Vaughan Female 1951       Ethnic Group Patient Race    Not of  or  Origin White      Your Visit Details     Date & Time Provider Department    2017 2:40 PM Arturo Jones NP [facility] [address]      Your Upcoming Appointment*(Max 10)     2017  1:10 PM CDT   Follow-up Visit with Arturo Jones NP   [facility] [address] ([facility])    [address]  [address]  [facility] [address]   [phone]            Tuesday May 09, 2017  9:00 AM CDT   Office Visit with Saba German MD   [place] [address] ([facility])     [address] [address]   [phone]            Thursday May 11, 2017  1:00 PM CDT   Behavioral Health Extended Follow-Up with Gely Torrez MD   [facility] ([facility])    [address] [facility] [address]   [phone]            2017  9:00 AM CST   Medicare Wellness Visit with Follow-Up with Saba German MD   [place] [address] ([facility])     [address] [address]   [phone]              Your To Do List     Future Orders Please Complete On or Around Expires    THYROID STIMULATING HORMONE  Mar 21, 2017 May 09, 2017    Follow-Up    Return  "[facility]   Physical Therapy Treatment Note      Name: Hu Lopez  Clinic Number: [id number]    Therapy Diagnosis:   Encounter Diagnoses   Name Primary?    Balance disorder Yes    Abnormal gait        Physician: Tony Gonzalez, *    Visit Date: 11/5/2024  Physician Orders: PT Eval and Treat   Medical Diagnosis from Referral:   R29.6 (ICD-10-CM) - Falls frequently   Evaluation Date: 9/30/2024  Authorization Period Expiration: 12/31/2024  Plan of Care Expiration: 11/11/2024  Progress Note Due: 10/30/2024  Visit # / Visits authorized: 4/20   FOTO: 9/30/2024      Time In: 1:03 pm  Time Out: 2: 00 pm  Total Billable Time: 57 minutes    Subjective     Pt reports: Still having some issues with his cough. His legs are a little tired today.   Pain: 0/10  Location: NA      Objective      Objective Measures updated at progress report unless specified.     Treatment     Hu received the treatments listed below:    Hu participated in neuromuscular re-education activities to improve: Balance, Coordination, Kinesthetic, Sense, Proprioception, and Posture for 55 minutes. The following activities were included:  Sit to Stand 3x10  Standing hip abduction 2x10 B  Standing Hip extension 2x10 B  //bars Tandem Stance with foam 30sec 3x- CGA with gait belt  // Bars cone taps 30sec 3x -CGA with gait belt  Forward Hurdles 2 laps - CGA with gait belt  Lateral Hurdles 2 laps- CGA with gait belt  Gait with horizontal head turns 4 laps  LAQ 4# 3x10 B  Step ups 4" 2x10  Not today  Nu Step 5 min lvl 2    Bold = Performed    Patient Education and Home Exercises       Education provided:   - Pt educated on POC  - Pt educated on HEP  - Pt educated on anatomy and physiology of current condition as it relates to signs and symptoms    Written Home Exercises Provided: Patient instructed to cont prior HEP. Exercises were reviewed and Hu was able to demonstrate them prior to the end of the session.  Hu " demonstrated fair  understanding of the education provided. See EMR under Patient Instructions for exercises provided during therapy sessions    Assessment   Hu presents to PT today with reports of leg weakness. He is showing slow improvement but it is clear he has not been performing exercising at home. He requires a lot of SBA to prevent any fall due to loss of balance. PT to continue to monitor response to treatment.     Hu Is progressing well towards his goals.   Pt prognosis is Fair.     Pt will continue to benefit from skilled outpatient physical therapy to address the deficits listed in the problem list box on initial evaluation, provide pt/family education and to maximize pt's level of independence in the home and community environment.     Pt's spiritual, cultural and educational needs considered and pt agreeable to plan of care and goals.     Anticipated barriers to physical therapy: Age     Goals:   STG (3 Weeks)  Pt will be independent with Initial home exercise program in order to facilitate Physical Therapy treatment  Pt will improve Timed up and go time to 12 seconds in order to reduce risk for falls.  Pt will improve hip abduction PGM strength to 3+/5 in order to improve function  Pt will improve BBS score to 39/56 in order to reduce risk for falls.     LTG(6 weeks)  Pt will be independent c final home exercise program in order to DC to home program  Pt will improve FOTO limitation to 53% indicative of overall improvement in function   Pt will improve hip abduction PGM strength to 4/5 in order to improve function.  Pt will improve BBS to 44/56 in order to reduce risk for falls and improve overall function.    Plan   Continue to address LE functional strength and dynamic balance.    Margie Coleman PT, DPT, OCS              in about 3 months (around 2017).      Conditions Discussed Today or Order-Related Diagnoses        Comments    Glucocorticoid deficiency    -  Primary     S/P gastrectomy         Acquired hypothyroidism         Uncontrolled type 1 diabetes mellitus without complication           Your Goals     decrease blood sugar variablity     improve food spikes       Your Vitals Were     BP Pulse Height Weight BMI Smoking Status    126/58 70 5' 4.5\" (1.638 m) 151 lb 12.8 oz (68.9 kg) 25.65 kg/m2 Former Smoker      Medications Prescribed or Re-Ordered Today     None      Your Current Medications Are        Disp Refills Start End    levothyroxine (SYNTHROID, LEVOTHROID) 112 MCG tablet 90 tablet 3 2017     Sig: TAKE 1 TABLET BY MOUTH DAILY    Class: Eprescribe    Cosign for Ordering: Accepted by Sam Germain MD on 2017  4:49 PM    Vitamin D, Ergocalciferol, 86638 UNITS capsule 12 capsule 3 2017     Si capsule orally weekly    Class: Eprescribe    insulin aspart (NOVOLOG) 100 UNIT/ML injection 60 mL 3 2016     Sig: Use up to 60 units daily per insulin pump.    Class: Eprescribe    Cosign for Ordering: Accepted by Sam Germain MD on 2016  5:19 AM    pramipexole (MIRAPEX) 0.5 MG tablet 90 tablet 4 2016     Si TAB PO QHS.    Class: Eprescribe    lamotrigine (LAMICTAL) 200 MG tablet 135 tablet 3 2016     Sig - Route: Take 1.5 tablets by mouth nightly. D/C PREVIOUS RX. - Oral    Class: Eprescribe    buPROPion (WELLBUTRIN SR) 100 MG 12 hr tablet 180 tablet 3 2016     Si tab po q am and 1 tab po q noon.    Class: Eprescribe    fluoxetine (PROZAC) 40 MG capsule 90 capsule 3 2016     Sig - Route: Take 1 capsule by mouth daily. D/C PREVIOUS RX. - Oral    Class: Eprescribe    LORazepam (ATIVAN) 0.5 MG tablet 15 tablet 3 2016     Si tab po q day as needed.    TEMAZepam (RESTORIL) 7.5 MG capsule 15 capsule 2 2016     Sig - Route: Take 1 capsule by mouth  nightly. - Oral    JOSHUA CONTOUR NEXT TEST 200 strip 11 6/10/2016 6/10/2017    Sig - Route: 1 each by Other route 6 times daily. Test blood sugar 7 times daily as directed. Diagnosis: E10.9. Meter: Contour Next link - Other    Class: Eprescribe    Cosign for Ordering: Accepted by Sam Germain MD on 6/10/2016  1:23 PM    celecoxib (CELEBREX) 200 MG capsule 60 capsule 6 2/15/2016     Sig: TAKE ONE CAPSULE BY MOUTH DAILY    Class: Eprescribe    ACCU-CHEK FASTCLIX LANCETS Misc 700 each 3 2/12/2016     Sig: To test blood sugar 7 times daily with Smartview lancets. DM2 E10.65    Class: Eprescribe    Cosign for Ordering: Accepted by Sam Germain MD on 2/12/2016  3:24 PM    hydrocortisone (CORTEF) 10 MG tablet 225 tablet 3 2/8/2016     Sig: TAKE 1 1/2 TABLET (15 MG) IN THE MORNING AND TAKE 1 TABLET (10 MG) BY MOUTH IN THE AFTERNOON    Class: Eprescribe    Cosign for Ordering: Accepted by Sam Germain MD on 2/8/2016 11:53 AM    Alcohol Swabs (ALCOHOL WIPES) Pads 1000 each 3 2/2/2016     Sig - Route: 1 Units daily as needed (For checking blood sugars and placing insulin pump.). Max 10 pads/day. Dx E10.65 - Does not apply    Class: Eprescribe    Cosign for Ordering: Accepted by Sam Germain MD on 2/2/2016 11:09 AM    fludrocortisone (FLORINEF) 0.1 MG tablet 90 tablet 3 2/2/2016     Sig - Route: Take 1 tablet by mouth daily. - Oral    Class: Eprescribe    Cosign for Ordering: Accepted by Sam Germain MD on 2/2/2016  3:43 PM    atorvastatin (LIPITOR) 10 MG tablet 90 tablet 3 2/2/2016     Sig - Route: Take 1 tablet by mouth daily. - Oral    Class: Eprescribe    Cosign for Ordering: Accepted by Sam Germain MD on 2/2/2016  3:43 PM    Urine Glucose-Ketones Test Strip 100 strip 4 6/30/2015     Sig: Test urine with ketone strip if blood sugar is over 300 mg/dL    Class: Eprescribe    Cosign for Ordering: Accepted by Arturo Jones NP on 6/30/2015 10:48 AM    Loperamide HCl (IMODIUM A-D PO)         Sig - Route: Take  by mouth 2 times daily. - Oral    Class: Historical Med      Allergies     Ambien Other (See Comments)    Sleep walking, in the past fractured a toe while sleep walking    Ambien Cr Other (See Comments)    Sleep walking disorder    Codeine PRURITUS    Geodon [Ziprasidone Hydrochloride]     Blood sugar spikes    Seroquel Xr [Quetiapine Fumerate] Other (See Comments)    Weight gain of 20 pounds over 3 months.    Valium [Diazepam] Other (See Comments)    Too sedating, even at low doses. Patient stated she is taking Valium low dose for anxiety which has been helping her without any side effect-11/9/16    Vicodin [Hydrocodone-acetaminophen] PRURITUS    Xanax Other (See Comments)    10 pound weight gain in 1 month.      Immunizations History as of 2/7/2017     Name Date    INFLUENZA QUADRIVALENT 9/24/2015    Influenza 9/23/2014  2:46 PM, 11/21/2013, 11/15/2012, 10/1/2011, 10/11/2010, 9/21/2009, 11/3/2008, 10/25/2007, 10/1/2007, 10/4/2006    Influenza A novel H1N1 12/1/2009    Influenza High Dose Pres Free 10/4/2016    Pneumococcal Conjugate 13 Valent 10/4/2016, 9/24/2015    Pneumococcal Polysaccharide Adult 1/30/1995    Td:Adult type tetanus/diphtheria 8/12/2010    Vitamin B12 1/29/2015  3:09 PM, 12/29/2014  3:11 PM, 11/25/2014  3:30 PM, 10/28/2014  2:59 PM, 9/23/2014  2:46 PM, 8/19/2014  2:45 PM, 7/15/2014  2:55 PM, 6/17/2014  4:19 PM, 5/15/2014, 4/17/2014, 3/20/2014, 2/20/2014, 1/16/2014, 12/26/2013, 11/21/2013, 10/17/2013, 6/25/2013, 5/20/2013, 4/22/2013, 3/18/2013, 2/18/2013, 1/21/2013, 12/18/2012, 11/20/2012, 10/23/2012, 10/9/2012, 9/11/2012, 8/28/2012, 8/14/2012, 7/17/2012, 6/19/2012, 6/5/2012, 5/22/2012, 5/8/2012, 4/24/2012, 4/10/2012, 3/27/2012, 3/13/2012, 3/1/2012, 2/14/2012, 2/2/2012, 1/19/2012, 1/3/2012, 12/20/2011, 12/6/2011, 11/22/2011, 11/10/2011, 10/27/2011, 10/13/2011      Problem List as of 2/7/2017     Vulvar abscess    Encounter for screening mammogram for malignant neoplasm of  breast - comment     Flatulence, eructation, and gas pain    Postgastric surgery syndromes    Glucocorticoid deficiency    Back pain    S/P gastrectomy    B12 deficiency    Acquired hypothyroidism    Mixed hyperlipidemia    RLS (restless legs syndrome)    Personal history of malignant melanoma of skin    Dermatophytosis of nail    Hallux valgus    Hammer toe    Insulin pump status    Osteoporosis seen 6/2014, sees dr henley    History of tobacco use    Hypersomnolence    Asteatotic dermatitis    Carpal tunnel syndrome    History of gastric cancer    Bipolar 1 disorder    Diabetes mellitus type 1, uncontrolled    Need for pneumococcal vaccine    Menopause     NOT  meet criteria for stopping paps - comment     Angioma    Chronic diarrhea    Rupture of tendon of thumb    Nasal sore              Patient Instructions    1. Take your hydrocortisone 15 mg in the morning and 10 mg in the afternoon. If you are ill, double your dose for 3 days and call us.     2. Have your thyroid hormone checked in 6 weeks. Take your levothyroxine every day.     3. Call us or drop off your meter in 2 weeks, so we can see if the changes work.     LIAM Augustine, ANP-BC  Nurse Practitioner  [facility]        Prescription Policy     Our goal is to serve you on a more timely basis. Currently, our office receives a large volume of phone requests for medication refills. We are requesting your cooperation with the following:    · Look over your medications, diabetic supplies, etc. before coming to your appointment to see if you need any refills.    · Request your medication (or supply) refills during your office visit.    · Outside of an office visit, requests should be directed to your pharmacy even if you have zero refills. Call your pharmacy after 72 hours to see if your prescription is ready for pick-up.     Pharmacy Refills: All prescriptions take 48-72 hours to refill.          Our Patients are Important    We want  to improve and you can help. You may receive a survey asking you about this visit. Please complete this survey; we will use your feedback to make improvements. Thank you.

## 2024-12-03 DIAGNOSIS — F41.9 ANXIETY: ICD-10-CM

## 2024-12-04 ENCOUNTER — LAB VISIT (OUTPATIENT)
Dept: LAB | Facility: OTHER | Age: 89
End: 2024-12-04
Attending: UROLOGY
Payer: MEDICARE

## 2024-12-04 DIAGNOSIS — C61 PROSTATE CANCER: ICD-10-CM

## 2024-12-04 LAB — COMPLEXED PSA SERPL-MCNC: 7.3 NG/ML (ref 0–4)

## 2024-12-04 PROCEDURE — 84153 ASSAY OF PSA TOTAL: CPT | Mod: HCNC | Performed by: UROLOGY

## 2024-12-04 PROCEDURE — 36415 COLL VENOUS BLD VENIPUNCTURE: CPT | Mod: HCNC | Performed by: UROLOGY

## 2024-12-04 RX ORDER — SERTRALINE HYDROCHLORIDE 100 MG/1
100 TABLET, FILM COATED ORAL DAILY
Qty: 90 TABLET | Refills: 3 | Status: SHIPPED | OUTPATIENT
Start: 2024-12-04

## 2024-12-05 NOTE — ASSESSMENT & PLAN NOTE
Parkinsonism, senile debility, and low vision all are contributing factors.  Ambulation outside the home is limited by endurance and slow pace.  A rolling bench walker will enable him to ambulate for longer distances more safely in and out of the home, and allow him to stop to rest when needed on longer excursions.  The mobility limitation cannot be sufficiently resolved by the use of a cane. Patient's functional mobility deficit can be sufficiently resolved with the use of a Rollator. Patient's mobility limitation significantly impairs their ability to participate in one of more activities of daily living. The use of a Rollator will significantly improve the patient's ability to participate in MRADLS and the patient will use it on regular basis in and out of the home.    option of bronchoscopy.  I discussed with her that in reviewing her CT scan the nodule central and there is an airway leading directly to it and I think she would be a good candidate for Ion robotic navigational bronchoscopy.  I discussed with her that this technology is new at our hospital and I have not personally performed this procedure on a human yet although I have gone through the appropriate training.  I discussed our hospital is performing this every second and fourth Thursday of the month and she would like to proceed on this upcoming Thursday.  We discussed the risk of this procedure to include bleeding particularly if this is a carcinoid, hemoptysis, cough.  We discussed the possibility of nondiagnostic biopsy and need for further biopsy.  We also discussed the ability to perform an EBUS at the time of navigational bronchoscopy.  We also discussed obtaining a PET scan for evaluating avidity of the mass and rule out distant disease.  Explained to her that we will need another CT scan performed with Ion protocol done prior to the procedure next week and we will get that done ASAP.  All of her and her 's questions were answered to their satisfaction.    Patient Active Problem List   Diagnosis    Hyperlipidemia    FHx: breast cancer in first degree relative    Vitamin D deficiency    Hypertension    Colon polyp    Prediabetes    Normocytic anemia    Gastroesophageal reflux disease without esophagitis    Nodule of upper lobe of right lung       Plan:  Schedulers messaged to post Ms. Clark for Ion robotic navigational bronchoscopy and EBUS on 12/12/2024  Orders placed for stat CT chest Ion protocol.  Ms. Clark was given the number for central scheduling to get this arranged  Ms. Clark was instructed to hold her aspirin until the procedure  PET scan ordered as well  I also discussed with Ms. Clark the incidental findings of 1 cm thyroid nodules of which I recommend she reach out to her PCP

## 2025-01-08 NOTE — PROGRESS NOTES
Clinic Note  1/8/2025      Subjective:         Chief Complaint:   HPI  Hu Lopez is a 89 y.o. male with a history of prostate cancer.  radical retropubic prostatectomy in 2008 by Dr. Mitchell. Rising PSA. Bone scan and CT scan in February 2016 did not show metastatic disease.  Patient complains of nocturia 4-5x/noc for last month. Started on Ditropan XL a year ago per symptoms improved.  Recent PSA 0.77 9/24/2018. Recent   Bilateral inguinal hernia surgery.   post void residual 46 ccs.     9/28/2023- Last visit here in 2019. PSA 4.5. Today he complains of weak stream, nocturia x3-4 and weak stream. Does not have daytime frequency.  He reports similar symptoms several years ago which was relieved by ditropan, which was subsequently discontinued by his neurologist. He was started on Myrbetriq which he still takes.      10/17/2023- PSMA scan- no avid nodes or metastasis to my review.Not read by radiology yet.      5/28/2024- PSA 6.4  He is feeling fine. LUTS -nocturia,decreased force of stream. Has appointment with Dr Cosme tomorrow.    1/9/2025- PSA 7.3. Had good visit with Dr Cosme. Cystoscopy did not show significant obstruction or stricture.      Lab Results   Component Value Date    PSA 0.34 06/20/2017    PSA 0.02 01/19/2012    PSADIAG 7.3 (H) 12/04/2024    PSADIAG 6.8 (H) 09/09/2024    PSADIAG 6.2 (H) 05/24/2024    PSADIAG 4.5 (H) 09/22/2023    PSADIAG 1.1 01/27/2020    PSADIAG 0.55 01/22/2019    PSADIAG 0.63 07/19/2018    PSADIAG 0.59 07/13/2018    PSADIAG 0.40 01/08/2018    PSADIAG 0.26 09/21/2016    PSATOTAL 2.0 08/25/2022    PSAFREE 0.87 08/25/2022    PSAFREEPCT 43.50 08/25/2022      Past Medical History:   Diagnosis Date    Allergy     Basal cell carcinoma     BPH (benign prostatic hyperplasia)     CAD (coronary artery disease)     Cataracts, bilateral     Diverticulitis     DVT (deep venous thrombosis)     Elevated PSA     GERD (gastroesophageal reflux disease)     Glaucoma     Hyperlipidemia      Hypertension     Prostate cancer     Skin cancer     Sleep apnea     CPAP     Family History   Problem Relation Name Age of Onset    Prostate cancer Father      Colon cancer Paternal Uncle      Stroke Paternal Uncle      Depression Paternal Uncle      No Known Problems Mother      Heart attack Brother 2 70     Social History     Socioeconomic History    Marital status:    Tobacco Use    Smoking status: Never    Smokeless tobacco: Never   Substance and Sexual Activity    Alcohol use: Yes     Alcohol/week: 0.0 standard drinks of alcohol     Comment: rare    Drug use: No    Sexual activity: Never   Social History Narrative    3 children - healthy.     Past Surgical History:   Procedure Laterality Date    APPENDECTOMY      COLON SURGERY      75,05    COLONOSCOPY      11    COLOSTOMY      PROSTATE SURGERY  2008    REVISION COLOSTOMY      SKIN CANCER EXCISION      chin    UPPER GASTROINTESTINAL ENDOSCOPY      11     Patient Active Problem List   Diagnosis    History of diverticulitis    GERD (gastroesophageal reflux disease)    CALVIN (obstructive sleep apnea)    Encysted hydrocele    History of prostate cancer    Nocturia    Essential hypertension    HLD (hyperlipidemia)    PARVEEN (generalized anxiety disorder)    Urgency of urination    Prostate cancer    Atherosclerosis of arteries of extremities    Environmental allergies    Aortic atherosclerosis    Ectatic aorta    OAB (overactive bladder)    Hx of radical prostatectomy    Weak urine stream    Onychomycosis    Iron deficiency anemia    Insomnia    Alzheimer's disease without behavioral disturbance    Stage 3b chronic kidney disease    Impaired functional mobility, balance, gait, and endurance    Alteration in self-care ability    Depression, recurrent    SVT (supraventricular tachycardia)    Mixed conductive and sensorineural hearing loss of both ears    Senile purpura    Parkinsonism, unspecified Parkinsonism type    Other mild frontotemporal dementia with  "other behavioral disturbance    Falls frequently    Balance disorder    Abnormal gait     Review of Systems   Constitutional:  Negative for appetite change, chills, fatigue, fever and unexpected weight change.   HENT:  Negative for nosebleeds.    Respiratory:  Negative for shortness of breath and wheezing.    Cardiovascular:  Negative for chest pain, palpitations and leg swelling.   Gastrointestinal:  Negative for abdominal distention, abdominal pain, constipation, diarrhea, nausea and vomiting.   Genitourinary:  Negative for hematuria.   Musculoskeletal:  Negative for arthralgias and back pain.   Skin:  Negative for pallor.   Neurological:  Negative for dizziness, seizures and syncope.   Hematological:  Negative for adenopathy.   Psychiatric/Behavioral:  Negative for dysphoric mood.          Objective:      There were no vitals taken for this visit.  Estimated body mass index is 30.77 kg/m² as calculated from the following:    Height as of 10/25/24: 5' 9" (1.753 m).    Weight as of 10/25/24: 94.5 kg (208 lb 5.4 oz).  Physical Exam      Assessment and Plan:   Doing well. Recommend F/U in survivorship in one year with PSA.        Problem List Items Addressed This Visit       Prostate cancer - Primary       Follow up:       Royal Canas          "

## 2025-01-09 ENCOUNTER — OFFICE VISIT (OUTPATIENT)
Dept: UROLOGY | Facility: CLINIC | Age: OVER 89
End: 2025-01-09
Payer: MEDICARE

## 2025-01-09 VITALS
SYSTOLIC BLOOD PRESSURE: 177 MMHG | HEART RATE: 88 BPM | DIASTOLIC BLOOD PRESSURE: 92 MMHG | BODY MASS INDEX: 30.36 KG/M2 | WEIGHT: 205 LBS | HEIGHT: 69 IN

## 2025-01-09 DIAGNOSIS — C61 PROSTATE CANCER: Primary | ICD-10-CM

## 2025-01-09 PROCEDURE — 99999 PR PBB SHADOW E&M-EST. PATIENT-LVL III: CPT | Mod: PBBFAC,HCNC,, | Performed by: UROLOGY

## 2025-01-13 DIAGNOSIS — Z00.00 ENCOUNTER FOR MEDICARE ANNUAL WELLNESS EXAM: ICD-10-CM

## 2025-01-27 RX ORDER — VIBEGRON 75 MG/1
75 TABLET, FILM COATED ORAL DAILY
Qty: 30 TABLET | Refills: 11 | Status: SHIPPED | OUTPATIENT
Start: 2025-01-27

## 2025-04-17 DIAGNOSIS — I47.10 SVT (SUPRAVENTRICULAR TACHYCARDIA): ICD-10-CM

## 2025-04-17 DIAGNOSIS — F33.9 DEPRESSION, RECURRENT: ICD-10-CM

## 2025-04-17 RX ORDER — METOPROLOL SUCCINATE 25 MG/1
25 TABLET, EXTENDED RELEASE ORAL DAILY
Qty: 90 TABLET | Refills: 3 | Status: SHIPPED | OUTPATIENT
Start: 2025-04-17 | End: 2026-04-17

## 2025-04-17 RX ORDER — CHOLECALCIFEROL (VITAMIN D3) 25 MCG
1000 TABLET ORAL DAILY
Qty: 90 TABLET | Refills: 3 | Status: SHIPPED | OUTPATIENT
Start: 2025-04-17

## 2025-04-22 ENCOUNTER — OFFICE VISIT (OUTPATIENT)
Dept: UROLOGY | Facility: CLINIC | Age: OVER 89
End: 2025-04-22
Payer: MEDICARE

## 2025-04-22 VITALS
BODY MASS INDEX: 30.96 KG/M2 | DIASTOLIC BLOOD PRESSURE: 79 MMHG | WEIGHT: 209.69 LBS | HEART RATE: 87 BPM | SYSTOLIC BLOOD PRESSURE: 124 MMHG

## 2025-04-22 DIAGNOSIS — N32.81 OAB (OVERACTIVE BLADDER): Primary | ICD-10-CM

## 2025-04-22 PROCEDURE — 3288F FALL RISK ASSESSMENT DOCD: CPT | Mod: CPTII,S$GLB,, | Performed by: UROLOGY

## 2025-04-22 PROCEDURE — 99214 OFFICE O/P EST MOD 30 MIN: CPT | Mod: S$GLB,,, | Performed by: UROLOGY

## 2025-04-22 PROCEDURE — 1101F PT FALLS ASSESS-DOCD LE1/YR: CPT | Mod: CPTII,S$GLB,, | Performed by: UROLOGY

## 2025-04-22 PROCEDURE — 1159F MED LIST DOCD IN RCRD: CPT | Mod: CPTII,S$GLB,, | Performed by: UROLOGY

## 2025-04-22 PROCEDURE — G2211 COMPLEX E/M VISIT ADD ON: HCPCS | Mod: S$GLB,,, | Performed by: UROLOGY

## 2025-04-22 PROCEDURE — 99999 PR PBB SHADOW E&M-EST. PATIENT-LVL III: CPT | Mod: PBBFAC,,, | Performed by: UROLOGY

## 2025-04-22 PROCEDURE — 1126F AMNT PAIN NOTED NONE PRSNT: CPT | Mod: CPTII,S$GLB,, | Performed by: UROLOGY

## 2025-04-22 NOTE — PROGRESS NOTES
[facility]      Return Overactive Bladder (OAB) Note    4/22/2025    Referred by:  No ref. provider found    History of Present Illness    CHIEF COMPLAINT:  Patient, an 89-year-old gentleman, presents for follow-up of his overactive bladder symptoms, which have been refractory to multiple medications.    HPI:  Patient is an 89-year-old gentleman returning for follow-up of his overactive bladder condition. His last evaluation was in August 2024 for a cystoscopy. He has a history of overactive bladder refractory to multiple therapies, including oxybutynin, Mirabegron, and Gemtesa. At baseline, he urinated 7 to 8 times during the day and 4 times at night, with urgency but without incontinence. Due to his age, he is not considered a good candidate for more invasive treatments such as sacral neuromodulation or Botox injections. A medication called Vivaldi was discussed previously, but Medicare approval was not obtained. A cystoscopy was performed to rule out vesicoureteral anastomotic stenosis or other anatomic obstructions, which were not found. He continues to wake up 3-4 times a night to urinate, which is the primary symptom he is seeking treatment for. He has a history of prostate cancer for which he had surgery several years ago. His PSA levels have been rising, with the most recent value reported as approximately 7. He has undergone a PET scan which came back normal, despite the rising PSA levels. He denies having current prostate cancer.    MEDICATIONS:  Patient is on Gemtesa for overactive bladder. He has discontinued oxybutynin and Mirabetric, which were previously used for the same condition.    MEDICAL HISTORY:  Patient has a history of overactive bladder that is refractory to multiple medications. He also has a history of prostate cancer from several years ago, with a rising PSA.    SURGICAL HISTORY:  Patient underwent prostate surgery several years ago for prostate cancer. He also had a  cystoscopy in August 2024.    A review of 10+ systems was conducted with pertinent positive and negative findings documented in HPI with all other systems reviewed and negative.    Past medical, family, surgical and social history reviewed as documented in chart with pertinent positive medical, family, surgical and social history detailed in HPI.    Previous incontinence therapies:  Behavioral Therapies  None  Medications  oral oxybutynin ER 10 mg 6 months (provided no benefit)  mirabegron (Myrbetriq) 50 mg 4 months (provided no benefit)  Gemtesa 75 mg 3 months - provided no benefit  Other Therapies  None      Exam Findings:    Physical Exam    General: No acute distress. Nontoxic appearing.  HENT: Normocephalic. Atraumatic.  Respiratory: Normal respiratory effort. No conversational dyspnea. No audible wheezing.  Abdomen: No obvious distension.  Skin: No visible abnormalities.  Extremities: No edema upper extremities. No edema lower extremities.  Neurological: Alert and oriented x3. Normal speech.  Psychiatric: Normal mood. Normal affect. No evidence of SI.          Assessment/Plan:    Assessment & Plan    N32.81 Overactive bladder  R35.1 Nocturia  R35.0 Frequency of micturition    Overactive bladder symptoms refractory to multiple medications.  At 89 years old, not a good candidate for sacral neuromodulation or Botox.  Vivally treatment not approved by Medicare.  Previous cystoscopy showed no evidence of vesicular urethral anastomotic stenosis or other anatomic obstruction.  Considered alternative treatment with TENS unit for tibial nerve stimulation, as a cost-effective option similar to more expensive devices used in clinical trials and not covered by Medicare currently.    PLAN SUMMARY:  - Purchase OTC TENS unit (suggested from [employer], or [employer])  - Use TENS unit for tibial nerve stimulation: 1 pad over tibial nerve, another on bottom of foot  - Use TENS unit on 1 leg at a time, 30 minutes, 3 times per  week  - Started Gemtesa for overactive bladder  - Follow up in 2 weeks to review effectiveness of TENS unit therapy and medication    N32.81 OVERACTIVE BLADDER:  - Explained use and safety of TENS unit for tibial nerve stimulation, including compatibility with pacemaker and potential improvement in overactive bladder symptoms based on clinical trial results (60-70% improvement) with similar therapy.  - Patient to purchase OTC TENS unit (suggested from [employer], [employer], or [employer]).  - Patient to use TENS unit for tibial nerve stimulation: Place 1 pad over tibial nerve and another on bottom of foot.  - Use on 1 leg at a time.  - Use for 30 minutes, 3 times per week.  - Started Gemtesa for overactive bladder.  - Follow up in 2 weeks to review effectiveness of TENS unit therapy and medication.              Visit complexity today is associated with medical care services that are part of the ongoing care related to the single serious and/or complex condition of Overactive bladder (OAB). A longitudinal relationship exists or is being developed between the patient and this practitioner for the care of this condition.

## 2025-05-16 ENCOUNTER — PATIENT MESSAGE (OUTPATIENT)
Facility: CLINIC | Age: OVER 89
End: 2025-05-16
Payer: MEDICARE

## 2025-05-28 ENCOUNTER — TELEPHONE (OUTPATIENT)
Dept: PRIMARY CARE CLINIC | Facility: CLINIC | Age: OVER 89
End: 2025-05-28
Payer: MEDICARE

## 2025-05-28 NOTE — TELEPHONE ENCOUNTER
Call made to patient daughter to get follow/up appointment scheduled no answer left voice to call office

## 2025-07-01 ENCOUNTER — OFFICE VISIT (OUTPATIENT)
Dept: UROLOGY | Facility: CLINIC | Age: OVER 89
End: 2025-07-01
Payer: MEDICARE

## 2025-07-01 VITALS
HEART RATE: 82 BPM | BODY MASS INDEX: 31.35 KG/M2 | SYSTOLIC BLOOD PRESSURE: 148 MMHG | WEIGHT: 212.31 LBS | DIASTOLIC BLOOD PRESSURE: 84 MMHG

## 2025-07-01 DIAGNOSIS — R33.9 URINARY RETENTION: ICD-10-CM

## 2025-07-01 DIAGNOSIS — R35.0 URINARY FREQUENCY: ICD-10-CM

## 2025-07-01 DIAGNOSIS — N32.81 OAB (OVERACTIVE BLADDER): ICD-10-CM

## 2025-07-01 DIAGNOSIS — F02.80 ALZHEIMER'S DISEASE WITHOUT BEHAVIORAL DISTURBANCE: Primary | ICD-10-CM

## 2025-07-01 DIAGNOSIS — R39.81 FUNCTIONAL URINARY INCONTINENCE: ICD-10-CM

## 2025-07-01 DIAGNOSIS — G30.9 ALZHEIMER'S DISEASE WITHOUT BEHAVIORAL DISTURBANCE: Primary | ICD-10-CM

## 2025-07-01 PROCEDURE — 99999 PR PBB SHADOW E&M-EST. PATIENT-LVL III: CPT | Mod: PBBFAC,,, | Performed by: UROLOGY

## 2025-07-01 NOTE — PROGRESS NOTES
[facility]        Return Overactive Bladder (OAB) Note     7/1/2025     Referred by:  No ref. provider found     History of Present Illness    CHIEF COMPLAINT:  Patient, an 89-year-old gentleman, presents for follow-up of his overactive bladder symptoms, which have been refractory to multiple medications.     HPI:  Patient is an 89-year-old gentleman returning for follow-up of his overactive bladder condition. His last evaluation was in August 2024 for a cystoscopy. He has a history of overactive bladder refractory to multiple therapies, including oxybutynin, Mirabegron, and Gemtesa. At baseline, he urinated 7 to 8 times during the day and 4 times at night, with urgency but without incontinence. Due to his age, he is not considered a good candidate for more invasive treatments such as sacral neuromodulation or Botox injections. A medication called Vivaldi was discussed previously, but Medicare approval was not obtained. A cystoscopy was performed to rule out vesicoureteral anastomotic stenosis or other anatomic obstructions, which were not found. He continues to wake up 3-4 times a night to urinate, which is the primary symptom he is seeking treatment for. He has a history of prostate cancer for which he had surgery several years ago. His PSA levels have been rising, with the most recent value reported as approximately 7. He has undergone a PET scan which came back normal, despite the rising PSA levels. He denies having current prostate cancer.     MEDICATIONS:  Patient is on Gemtesa for overactive bladder. He has discontinued oxybutynin and Mirabetric, which were previously used for the same condition.     MEDICAL HISTORY:  Patient has a history of overactive bladder that is refractory to multiple medications. He also has a history of prostate cancer from several years ago, with a rising PSA.     SURGICAL HISTORY:  Patient underwent prostate surgery several years ago for prostate cancer. He also  had a cystoscopy in August 2024.     A review of 10+ systems was conducted with pertinent positive and negative findings documented in HPI with all other systems reviewed and negative.     Past medical, family, surgical and social history reviewed as documented in chart with pertinent positive medical, family, surgical and social history detailed in HPI.     Previous incontinence therapies:  Behavioral Therapies  None  Medications  oral oxybutynin ER 10 mg 6 months (provided no benefit)  mirabegron (Myrbetriq) 50 mg 4 months (provided no benefit)  Gemtesa 75 mg 3 months - provided no benefit  Other Therapies  None        Exam Findings:     Physical Exam    General: No acute distress. Nontoxic appearing.  HENT: Normocephalic. Atraumatic.  Respiratory: Normal respiratory effort. No conversational dyspnea. No audible wheezing.  Abdomen: No obvious distension.  Skin: No visible abnormalities.  Extremities: No edema upper extremities. No edema lower extremities.  Neurological: Alert and oriented x3. Normal speech.  Psychiatric: Normal mood. Normal affect. No evidence of SI.           Assessment/Plan:     Assessment & Plan    N32.81 Overactive bladder  R35.1 Nocturia  R35.0 Frequency of micturition     Overactive bladder symptoms refractory to multiple medications.  At 89 years old, not a good candidate for sacral neuromodulation or Botox.  Vivally treatment not approved by Medicare.  Previous cystoscopy showed no evidence of vesicular urethral anastomotic stenosis or other anatomic obstruction.  Considered alternative treatment with TENS unit for tibial nerve stimulation, as a cost-effective option similar to more expensive devices used in clinical trials and not covered by Medicare currently.     PLAN SUMMARY:  - Purchase OTC TENS unit (suggested from [employer], [employer], or [employer])  - Use TENS unit for tibial nerve stimulation: 1 pad over tibial nerve, another on bottom of foot  - Use TENS unit on 1 leg at a time, 30  minutes, 3 times per week  - Started Gemtesa for overactive bladder  - Follow up in 2 weeks to review effectiveness of TENS unit therapy and medication     N32.81 OVERACTIVE BLADDER:  - Explained use and safety of TENS unit for tibial nerve stimulation, including compatibility with pacemaker and potential improvement in overactive bladder symptoms based on clinical trial results (60-70% improvement) with similar therapy.  - I demonstrated to the patient and his daughter how to use TENS unit for tibial nerve stimulation: Place 1 pad over tibial nerve and another on bottom of foot.  - Use on 1 leg at a time.  - Use for 30 minutes, 3 times per week.  - Continued Gemtesa for overactive bladder.    FUNCTIONAL INCONTINENCE  - I suspect that at least some of his incontinence issues may be related to memory and cognitive impairment

## 2025-07-24 DIAGNOSIS — G30.9 ALZHEIMER'S DISEASE WITHOUT BEHAVIORAL DISTURBANCE: ICD-10-CM

## 2025-07-24 DIAGNOSIS — K30 NUD (NONULCER DYSPEPSIA): ICD-10-CM

## 2025-07-24 DIAGNOSIS — I10 ESSENTIAL HYPERTENSION: ICD-10-CM

## 2025-07-24 DIAGNOSIS — F02.80 ALZHEIMER'S DISEASE WITHOUT BEHAVIORAL DISTURBANCE: ICD-10-CM

## 2025-07-25 RX ORDER — GALANTAMINE 4 MG/1
4 TABLET, FILM COATED ORAL 2 TIMES DAILY WITH MEALS
Qty: 60 TABLET | Refills: 11 | Status: SHIPPED | OUTPATIENT
Start: 2025-07-25 | End: 2026-07-25

## 2025-07-25 RX ORDER — OMEPRAZOLE 40 MG/1
40 CAPSULE, DELAYED RELEASE ORAL EVERY MORNING
Qty: 30 CAPSULE | Refills: 11 | Status: SHIPPED | OUTPATIENT
Start: 2025-07-25

## 2025-07-25 RX ORDER — LOSARTAN POTASSIUM 50 MG/1
50 TABLET ORAL DAILY
Qty: 90 TABLET | Refills: 3 | Status: SHIPPED | OUTPATIENT
Start: 2025-07-25 | End: 2026-07-25

## 2025-08-20 DIAGNOSIS — E78.5 HYPERLIPIDEMIA, UNSPECIFIED HYPERLIPIDEMIA TYPE: ICD-10-CM

## 2025-08-21 RX ORDER — ATORVASTATIN CALCIUM 40 MG/1
40 TABLET, FILM COATED ORAL DAILY
Qty: 28 TABLET | Refills: 11 | Status: SHIPPED | OUTPATIENT
Start: 2025-08-21